# Patient Record
Sex: FEMALE | Race: WHITE | NOT HISPANIC OR LATINO | Employment: FULL TIME | URBAN - METROPOLITAN AREA
[De-identification: names, ages, dates, MRNs, and addresses within clinical notes are randomized per-mention and may not be internally consistent; named-entity substitution may affect disease eponyms.]

---

## 2017-01-26 DIAGNOSIS — N94.3 PREMENSTRUAL TENSION SYNDROME: Primary | ICD-10-CM

## 2017-01-26 NOTE — TELEPHONE ENCOUNTER
FLUoxetine (PROZAC) 20 MG capsule     Last Written Prescription Date: 9/2/16  Last Fill Quantity: 180, # refills: 0  Last Office Visit with [facility] primary care provider:  12/16/15        Last PHQ-9 score on record=   PHQ-9 SCORE 6/22/2015   Total Score 4

## 2017-01-30 NOTE — TELEPHONE ENCOUNTER
Routing refill request to provider for review/approval because:  Drug interaction warning with inuprofen  Patient needs to be seen because it has been more than 1 year since last office visit.  Eli Garrett RN, BSN  [facility]

## 2017-03-06 ENCOUNTER — RADIANT APPOINTMENT (OUTPATIENT)
Dept: MAMMOGRAPHY | Facility: CLINIC | Age: 47
End: 2017-03-06
Payer: COMMERCIAL

## 2017-03-06 ENCOUNTER — OFFICE VISIT (OUTPATIENT)
Dept: FAMILY MEDICINE | Facility: CLINIC | Age: 47
End: 2017-03-06
Payer: COMMERCIAL

## 2017-03-06 VITALS
WEIGHT: 186 LBS | DIASTOLIC BLOOD PRESSURE: 80 MMHG | OXYGEN SATURATION: 97 % | SYSTOLIC BLOOD PRESSURE: 118 MMHG | HEIGHT: 62 IN | TEMPERATURE: 99 F | BODY MASS INDEX: 34.23 KG/M2 | HEART RATE: 83 BPM

## 2017-03-06 DIAGNOSIS — Z12.31 VISIT FOR SCREENING MAMMOGRAM: ICD-10-CM

## 2017-03-06 DIAGNOSIS — E03.4 HYPOTHYROIDISM DUE TO ACQUIRED ATROPHY OF THYROID: ICD-10-CM

## 2017-03-06 DIAGNOSIS — Z00.00 ROUTINE GENERAL MEDICAL EXAMINATION AT A HEALTH CARE FACILITY: Primary | ICD-10-CM

## 2017-03-06 DIAGNOSIS — N94.3 PREMENSTRUAL TENSION SYNDROME: ICD-10-CM

## 2017-03-06 DIAGNOSIS — Z13.6 CARDIOVASCULAR SCREENING; LDL GOAL LESS THAN 160: ICD-10-CM

## 2017-03-06 PROCEDURE — G0202 SCR MAMMO BI INCL CAD: HCPCS | Mod: TC

## 2017-03-06 PROCEDURE — 99396 PREV VISIT EST AGE 40-64: CPT | Performed by: FAMILY MEDICINE

## 2017-03-06 NOTE — TELEPHONE ENCOUNTER
levothyroxine (SYNTHROID, LEVOTHROID) 75 MCG tablet     Last Written Prescription Date: 09-  Last Quantity: 90, # refills: 0  Last Office Visit with G, P or [facility] prescribing provider: 06-   Next 5 appointments (look out 90 days)     Mar 06, 2017  1:20 PM CST   PHYSICAL with Karen Weiler, MD   [facility] [place] [facility] ([facility])    [address] [id number] [phone]                   TSH   Date Value Ref Range Status   03/04/2016 2.51 0.40 - 4.00 mU/L Final

## 2017-03-06 NOTE — PROGRESS NOTES
SUBJECTIVE:     CC: Leticia Aquino is an 46 year old woman who presents for preventive health visit.     Healthy Habits:    Do you get at least three servings of calcium containing foods daily (dairy, green leafy vegetables, etc.)? yes    Amount of exercise or daily activities, outside of work: 4 day(s) per week 40 minutes     Problems taking medications regularly No    Medication side effects: No    Have you had an eye exam in the past two years? no    Do you see a dentist twice per year? yes    Do you have sleep apnea, excessive snoring or daytime drowsiness?no    Menstrual periods:  Patient reports she no longer has menstrual periods because she had a partial hysterectomy, and still has her ovaries and cervix.    Thyroid:  Patient reports she has not had her thyroid levels checked in one year, but does not have complaints. She is stable on her current medication regimen.      Patient reports she is doing well on her prescription of Prozac.         Today's PHQ-2 Score:   PHQ-2 ( 1999 Pf[employer]) 3/6/2017 6/22/2015   Q1: Little interest or pleasure in doing things 0 0   Q2: Feeling down, depressed or hopeless 0 0   PHQ-2 Score 0 0       Abuse: Current or Past(Physical, Sexual or Emotional)- No  Do you feel safe in your environment - Yes    Social History   Substance Use Topics     Smoking status: Former Smoker     Smokeless tobacco: Never Used      Comment: QUIT 1988     Alcohol use Yes      Comment: 2-3 per week      The patient does not drink >3 drinks per day nor >7 drinks per week.    Recent Labs   Lab Test  06/08/15   0808  11/06/13   0911   CHOL  227*  217*   HDL  46*  52   LDL  146*  140*   TRIG  175*  124   CHOLHDLRATIO  4.9  4.2       Reviewed orders with patient.  Reviewed health maintenance and updated orders accordingly - Yes    Mammo Decision Support:  Patient under age 50, mutual decision reflected in health maintenance.      Pertinent mammograms are reviewed under the imaging tab.  History of  "abnormal Pap smear: NO - age 30-65 PAP every 5 years with negative HPV co-testing recommended    Reviewed and updated as needed this visit by clinical staff  Tobacco  Allergies  Meds  Med Hx  Surg Hx  Fam Hx  Soc Hx        Reviewed and updated as needed this visit by Provider        ROS:  C: NEGATIVE for fever, chills, change in weight  I: NEGATIVE for worrisome rashes, moles or lesions  E: NEGATIVE for vision changes or irritation  ENT: NEGATIVE for ear, mouth and throat problems  R: NEGATIVE for significant cough or SOB  B: NEGATIVE for masses, tenderness or discharge  CV: NEGATIVE for chest pain, palpitations or peripheral edema  GI: NEGATIVE for nausea, abdominal pain, heartburn, or change in bowel habits  : NEGATIVE for unusual urinary or vaginal symptoms. Periods are regular.  M: NEGATIVE for significant arthralgias or myalgia  N: NEGATIVE for weakness, dizziness or paresthesias  P: NEGATIVE for changes in mood or affect    Problem list, Medication list, Allergies, and Medical/Social/Surgical histories reviewed in [facility] and updated as appropriate.    This document serves as a record of the services and decisions personally performed and made by Karen Weiler, MD. It was created on her behalf by Rosetta Whittington, a trained medical scribe. The creation of this document is based the provider's statements to the medical scribe.  Rosetta Whittington March 6, 2017 1:15 PM    OBJECTIVE:     /80  Pulse 83  Temp 99  F (37.2  C) (Oral)  Ht 1.575 m (5' 2\")  Wt 84.4 kg (186 lb)  LMP 09/13/2012  SpO2 97%  BMI 34.02 kg/m2  EXAM:  GENERAL: healthy, alert and no distress  EYES: Eyes grossly normal to inspection, PERRL and conjunctivae and sclerae normal  HENT: ear canals and TM's normal, nose and mouth without ulcers or lesions  NECK: no adenopathy, no asymmetry, masses, or scars and thyroid normal to palpation  RESP: lungs clear to auscultation - no rales, rhonchi or wheezes  BREAST: normal without masses, tenderness or nipple " "discharge and no palpable axillary masses or adenopathy  CV: regular rate and rhythm, normal S1 S2, no S3 or S4, no murmur, click or rub, no peripheral edema and peripheral pulses strong  ABDOMEN: soft, nontender, no hepatosplenomegaly, no masses and bowel sounds normal  MS: no gross musculoskeletal defects noted, no edema  SKIN: no suspicious lesions or rashes  NEURO: Normal strength and tone, mentation intact and speech normal  PSYCH: mentation appears normal, affect normal/bright    ASSESSMENT/PLAN:     (Z00.00) Routine general medical examination at a health care facility  (primary encounter diagnosis)  Comment: Doing well  Plan: CBC with platelets and differential, Basic         metabolic panel        Follow up based on labs      (Z13.6) CARDIOVASCULAR SCREENING; LDL GOAL LESS THAN 160  Plan: Lipid panel reflex to direct LDL        Follow up based on labs      (E03.4) Hypothyroidism due to acquired atrophy of thyroid  Comment: History of thyroid dysfunction  Plan: TSH with free T4 reflex        Follow up based on labs      (N94.3) Premenstrual tension syndrome  Comment: Managed on current regimen  Plan: FLUoxetine (PROZAC) 20 MG capsule        Follow up as needed        COUNSELING:   Reviewed preventive health counseling, as reflected in patient instructions       (Teresa)menopause management         reports that she has quit smoking. She has never used smokeless tobacco.    Estimated body mass index is 34.02 kg/(m^2) as calculated from the following:    Height as of this encounter: 1.575 m (5' 2\").    Weight as of this encounter: 84.4 kg (186 lb).   Weight management plan: refer to patient instructions    Counseling Resources:  ATP IV Guidelines  Pooled Cohorts Equation Calculator  Breast Cancer Risk Calculator  FRAX Risk Assessment  ICSI Preventive Guidelines  Dietary Guidelines for Americans, 2010  USDA's MyPlate  ASA Prophylaxis  Lung CA Screening    The information in this document, created by the medical " scribe for me, accurately reflects the services I personally performed and the decisions made by me. I have reviewed and approved this document for accuracy prior to leaving the patient care area.  3/6/2017 1:14 PM         Karen Weiler, MD  [facility]

## 2017-03-06 NOTE — MR AVS SNAPSHOT
After Visit Summary   3/6/2017    Leticia Aquino    MRN: [id number]           Patient Information     Date Of Birth          1970        Visit Information        Provider Department      3/6/2017 1:20 PM Weiler, Karen, MD [facility]        Today's Diagnoses     CARDIOVASCULAR SCREENING; LDL GOAL LESS THAN 160    -  1    Visit for screening mammogram        Routine general medical examination at a health care facility        Hypothyroidism due to acquired atrophy of thyroid          Care Instructions      Preventive Health Recommendations  Female Ages 40 to 49    Yearly exam:     See your health care provider every year in order to  1. Review health changes.   2. Discuss preventive care.    3. Review your medicines if your doctor prescribed any.      Get a Pap test every three years (unless you have an abnormal result and your provider advises testing more often).      If you get Pap tests with HPV test, you only need to test every 5 years, unless you have an abnormal result. You do not need a Pap test if your uterus was removed (hysterectomy) and you have not had cancer.      You should be tested each year for STDs (sexually transmitted diseases), if you're at risk.       Ask your doctor if you should have a mammogram.      Have a colonoscopy (test for colon cancer) if someone in your family has had colon cancer or polyps before age 50.       Have a cholesterol test every 5 years.       Have a diabetes test (fasting glucose) after age 45. If you are at risk for diabetes, you should have this test every 3 years.    Shots: Get a flu shot each year. Get a tetanus shot every 10 years.     Nutrition:     Eat at least 5 servings of fruits and vegetables each day.    Eat whole-grain bread, whole-wheat pasta and brown rice instead of white grains and rice.    Talk to your provider about Calcium and Vitamin D.     Lifestyle    Exercise at least 150 minutes a week (an average of 30  minutes a day, 5 days a week). This will help you control your weight and prevent disease.    Limit alcohol to one drink per day.    No smoking.     Wear sunscreen to prevent skin cancer.    See your dentist every six months for an exam and cleaning.        Follow-ups after your visit        Future tests that were ordered for you today     Open Future Orders        Priority Expected Expires Ordered    TSH with free T4 reflex Routine  9/6/2017 3/6/2017    CBC with platelets and differential Routine  9/6/2017 3/6/2017    Basic metabolic panel Routine  9/6/2017 3/6/2017    Lipid panel reflex to direct LDL Routine  9/6/2017 3/6/2017            Who to contact     If you have questions or need follow up information about today's clinic visit or your schedule please contact [facility] [place] directly at [phone].  Normal or non-critical lab and imaging results will be communicated to you by [employer], letter or phone within 4 business days after the clinic has received the results. If you do not hear from us within 7 days, please contact the clinic through [employer] or phone. If you have a critical or abnormal lab result, we will notify you by phone as soon as possible.  Submit refill requests through [employer] or call your pharmacy and they will forward the refill request to us. Please allow 3 business days for your refill to be completed.          Additional Information About Your Visit        [employer] Information     [employer] gives you secure access to your electronic health record. If you see a primary care provider, you can also send messages to your care team and make appointments. If you have questions, please call your primary care clinic.  If you do not have a primary care provider, please call [phone] and they will assist you.        Care EveryWhere ID     This is your Care EveryWhere ID. This could be used by other organizations to access your [place] medical records  [id number]        Your Vitals Were  "    Pulse Temperature Height Last Period Pulse Oximetry BMI (Body Mass Index)    83 99  F (37.2  C) (Oral) 5' 2\" (1.575 m) 09/13/2012 97% 34.02 kg/m2       Blood Pressure from Last 3 Encounters:   03/06/17 118/80   12/21/15 136/86   12/16/15 126/84    Weight from Last 3 Encounters:   03/06/17 186 lb (84.4 kg)   12/21/15 200 lb (90.7 kg)   12/16/15 202 lb 11.2 oz (91.9 kg)               Primary Care Provider    [facility]       No address on file        Thank you!     Thank you for choosing [facility]  for your care. Our goal is always to provide you with excellent care. Hearing back from our patients is one way we can continue to improve our services. Please take a few minutes to complete the written survey that you may receive in the mail after your visit with us. Thank you!             Your Updated Medication List - Protect others around you: Learn how to safely use, store and throw away your medicines at www.disposemymeds.org.          This list is accurate as of: 3/6/17  1:42 PM.  Always use your most recent med list.                   Brand Name Dispense Instructions for use    acyclovir 200 MG capsule    ZOVIRAX    25 capsule    Take 1 capsule (200 mg) by mouth 5 times daily       FLUoxetine 20 MG capsule    PROZAC    60 capsule    Take 2 capsules (40 mg) by mouth daily       ibuprofen 600 MG tablet    ADVIL/MOTRIN    60    take 1 every 8 hrs if needed       levothyroxine 75 MCG tablet    SYNTHROID/LEVOTHROID    90 tablet    Take 1 tablet (75 mcg) by mouth daily         "

## 2017-03-06 NOTE — NURSING NOTE
"Chief Complaint   Patient presents with     Physical       Initial /80  Pulse 83  Temp 99  F (37.2  C) (Oral)  Ht 5' 2\" (1.575 m)  Wt 186 lb (84.4 kg)  LMP 09/13/2012  SpO2 97%  BMI 34.02 kg/m2 Estimated body mass index is 34.02 kg/(m^2) as calculated from the following:    Height as of this encounter: 5' 2\" (1.575 m).    Weight as of this encounter: 186 lb (84.4 kg).  Medication Reconciliation: complete   Smiley Whiteside Medical Assistant      "

## 2017-03-07 NOTE — TELEPHONE ENCOUNTER
Patient is coming in for lab work on 3/8/17 - will await results prior to refilling.  Eli Garrett RN, BSN  [facility]

## 2017-03-08 DIAGNOSIS — Z13.6 CARDIOVASCULAR SCREENING; LDL GOAL LESS THAN 160: ICD-10-CM

## 2017-03-08 DIAGNOSIS — E03.4 HYPOTHYROIDISM DUE TO ACQUIRED ATROPHY OF THYROID: ICD-10-CM

## 2017-03-08 DIAGNOSIS — Z00.00 ROUTINE GENERAL MEDICAL EXAMINATION AT A HEALTH CARE FACILITY: ICD-10-CM

## 2017-03-08 LAB
ANION GAP SERPL CALCULATED.3IONS-SCNC: 6 MMOL/L (ref 3–14)
BASOPHILS # BLD AUTO: 0 10E9/L (ref 0–0.2)
BASOPHILS NFR BLD AUTO: 0.9 %
BUN SERPL-MCNC: 18 MG/DL (ref 7–30)
CALCIUM SERPL-MCNC: 9.1 MG/DL (ref 8.5–10.1)
CHLORIDE SERPL-SCNC: 107 MMOL/L (ref 94–109)
CHOLEST SERPL-MCNC: 207 MG/DL
CO2 SERPL-SCNC: 27 MMOL/L (ref 20–32)
CREAT SERPL-MCNC: 0.83 MG/DL (ref 0.52–1.04)
DIFFERENTIAL METHOD BLD: NORMAL
EOSINOPHIL # BLD AUTO: 0.2 10E9/L (ref 0–0.7)
EOSINOPHIL NFR BLD AUTO: 5.4 %
ERYTHROCYTE [DISTWIDTH] IN BLOOD BY AUTOMATED COUNT: 13.1 % (ref 10–15)
GFR SERPL CREATININE-BSD FRML MDRD: 73 ML/MIN/1.7M2
GLUCOSE SERPL-MCNC: 96 MG/DL (ref 70–99)
HCT VFR BLD AUTO: 38.6 % (ref 35–47)
HDLC SERPL-MCNC: 52 MG/DL
HGB BLD-MCNC: 13.1 G/DL (ref 11.7–15.7)
LDLC SERPL CALC-MCNC: 129 MG/DL
LYMPHOCYTES # BLD AUTO: 1 10E9/L (ref 0.8–5.3)
LYMPHOCYTES NFR BLD AUTO: 23.4 %
MCH RBC QN AUTO: 31 PG (ref 26.5–33)
MCHC RBC AUTO-ENTMCNC: 33.9 G/DL (ref 31.5–36.5)
MCV RBC AUTO: 91 FL (ref 78–100)
MONOCYTES # BLD AUTO: 0.6 10E9/L (ref 0–1.3)
MONOCYTES NFR BLD AUTO: 14.3 %
NEUTROPHILS # BLD AUTO: 2.4 10E9/L (ref 1.6–8.3)
NEUTROPHILS NFR BLD AUTO: 56 %
NONHDLC SERPL-MCNC: 155 MG/DL
PLATELET # BLD AUTO: 226 10E9/L (ref 150–450)
POTASSIUM SERPL-SCNC: 4 MMOL/L (ref 3.4–5.3)
RBC # BLD AUTO: 4.23 10E12/L (ref 3.8–5.2)
SODIUM SERPL-SCNC: 140 MMOL/L (ref 133–144)
T4 FREE SERPL-MCNC: 0.97 NG/DL (ref 0.76–1.46)
TRIGL SERPL-MCNC: 131 MG/DL
TSH SERPL DL<=0.005 MIU/L-ACNC: 5.16 MU/L (ref 0.4–4)
WBC # BLD AUTO: 4.3 10E9/L (ref 4–11)

## 2017-03-08 PROCEDURE — 85025 COMPLETE CBC W/AUTO DIFF WBC: CPT | Performed by: FAMILY MEDICINE

## 2017-03-08 PROCEDURE — 36415 COLL VENOUS BLD VENIPUNCTURE: CPT | Performed by: FAMILY MEDICINE

## 2017-03-08 PROCEDURE — 80061 LIPID PANEL: CPT | Performed by: FAMILY MEDICINE

## 2017-03-08 PROCEDURE — 84443 ASSAY THYROID STIM HORMONE: CPT | Performed by: FAMILY MEDICINE

## 2017-03-08 PROCEDURE — 80048 BASIC METABOLIC PNL TOTAL CA: CPT | Performed by: FAMILY MEDICINE

## 2017-03-08 PROCEDURE — 84439 ASSAY OF FREE THYROXINE: CPT | Performed by: FAMILY MEDICINE

## 2017-03-09 RX ORDER — LEVOTHYROXINE SODIUM 75 UG/1
75 TABLET ORAL DAILY
Qty: 90 TABLET | Refills: 0 | Status: CANCELLED | OUTPATIENT
Start: 2017-03-09

## 2017-03-09 NOTE — TELEPHONE ENCOUNTER
Routing refill request to provider for review/approval because:  Patient labs completed.  Please advise to if appropriate dose or follow up requirements.  Jocelyn Driscoll RN  [employer]

## 2017-03-14 RX ORDER — LEVOTHYROXINE SODIUM 100 UG/1
100 TABLET ORAL DAILY
Qty: 30 TABLET | Refills: 1 | Status: SHIPPED | OUTPATIENT
Start: 2017-03-14 | End: 2017-04-27

## 2017-03-14 NOTE — TELEPHONE ENCOUNTER
Please see encounters below. Patient calling today as she is now out of medication. Labs have still not been reviewed, nor have dosing recommendations been made. Are you able to review labs and advise in MD DEZ's absence?  Eli Garrett RN, BSN  [facility]

## 2017-03-14 NOTE — TELEPHONE ENCOUNTER
Spoke with patient and advised of MD ZACHARIAH's message below.     Patient verbalized understanding and agrees with plan.    Will call back if further questions or concerns.    Eli Garrett, RN, BSN

## 2017-03-14 NOTE — TELEPHONE ENCOUNTER
TSH is little high, indicating we may not be giving high enough dose of levothyroxine.  I've increased her dose from 75 mcg to 100 mcg, sending this to:    [facility] [id number] [address] AT [facility] OF [facility] [address] [address]  Phone: [phone] Fax: [phone]     She should have her TSH rechecked in about six weeks with the lab.  If her TSH is below 2 when it's rechecked, 100 mcg will be our new levothyroxine dose and I can send in a full year's worth of refills to her pharmacy.  The future TSH in 6 weeks will come to me instead of Dr. Weiler since I'm ordering it.  I'll send her that result and instructions through her [employer] account once the labs come back to me.     Please call patient.     Adeel Draper MD

## 2017-03-15 DIAGNOSIS — E03.4 HYPOTHYROIDISM DUE TO ACQUIRED ATROPHY OF THYROID: ICD-10-CM

## 2017-03-15 RX ORDER — LEVOTHYROXINE SODIUM 100 UG/1
100 TABLET ORAL DAILY
Qty: 90 TABLET | Refills: 1 | Status: CANCELLED | OUTPATIENT
Start: 2017-03-15

## 2017-03-15 NOTE — TELEPHONE ENCOUNTER
Since this is a new dose for Ms. Aquino, we need to find out if this is the correct dose for her but we can only determine that after we check another TSH in 6 weeks.  That's how long it takes the new dosage to affect the thyroid labs.  If her TSH check in 6 weeks shows that it's now at a normal level we then can refill it for 90 days with a year of refills.  Please call patient.     Adeel Draper MD

## 2017-03-15 NOTE — TELEPHONE ENCOUNTER
levothyroxine (SYNTHROID/LEVOTHROID) 100 MCG tablet  Requesting 90 day supply     Last Written Prescription Date: 3/14/2017  Last Quantity: 30 tablet, # refills: 1  Last Office Visit with [facility], [facility] or [facility] prescribing provider: 3/6/2017        TSH   Date Value Ref Range Status   03/08/2017 5.16 (H) 0.40 - 4.00 mU/L Final

## 2017-03-15 NOTE — PROGRESS NOTES
Dear Leticia,    Dr. Weiler has reviewed your recent labs:  -Kidney function is normal (Cr, GFR), Sodium is normal, Potassium is normal, Calcium is normal, Glucose is normal (diabetes screening test).   -LDL(bad) cholesterol level is elevated.  A diet high in fat and simple carbohydrates, genetics and being overweight can contribute to this. ADVISE: Exercise, a low fat/low carbohydrate diet and weight control are helpful to improve this.  Rechecking your fasting cholesterol panel in 12 months is recommended.  -Your TSH was slightly elevated, but your active thyroid hormone level (free T4) was normal. Dr. Weiler would like you to stay on the same dose of thyroid medication and recheck your labs in 6 months. Please let us know sooner if you develop any symptoms of hypothyroidism (excessive fatigue, constipation, dry skin, weight gain, etc.).  -Normal red blood cell (hgb) levels, normal white blood cell count and normal platelet levels.    If you have further questions about the interpretation of your labs, [email or web address] is a good website to check out for further information.    Please contact the clinic if you have additional questions.  Thank you.    Sincerely,    Mariajose Gomez PA-C  Physician extender for Dr. Karen Weiler

## 2017-03-15 NOTE — TELEPHONE ENCOUNTER
Routing refill request to provider for review/approval because:  Labs out of range:  Patient is requesting a 90 day supply    Nelda Mcnulty RN  [facility]  [phone]

## 2017-04-12 ENCOUNTER — TELEPHONE (OUTPATIENT)
Dept: FAMILY MEDICINE | Facility: CLINIC | Age: 47
End: 2017-04-12

## 2017-04-27 DIAGNOSIS — E03.4 HYPOTHYROIDISM DUE TO ACQUIRED ATROPHY OF THYROID: ICD-10-CM

## 2017-04-27 LAB — TSH SERPL DL<=0.005 MIU/L-ACNC: 3.2 MU/L (ref 0.4–4)

## 2017-04-27 PROCEDURE — 84443 ASSAY THYROID STIM HORMONE: CPT | Performed by: FAMILY MEDICINE

## 2017-04-27 PROCEDURE — 36415 COLL VENOUS BLD VENIPUNCTURE: CPT | Performed by: FAMILY MEDICINE

## 2017-04-27 RX ORDER — LEVOTHYROXINE SODIUM 100 UG/1
100 TABLET ORAL DAILY
Qty: 90 TABLET | Refills: 3 | Status: SHIPPED | OUTPATIENT
Start: 2017-04-27 | End: 2018-05-14

## 2017-04-27 NOTE — PROGRESS NOTES
Ms. Aquino,    -TSH (thyroid stimulating hormone) level is normal which indicates appropriate thyroid replacement dosing.  ADVISE: continuing same replacement dose and recheck in 12 months (TSH w/ T4 reflex, DX: hypothyroidism.)  -1 year Rx for levothyroxine sent to   [id number] - [place], [place] - [address] AT [facility] OF [facility] [address]  [address]  [facility] [address]  Phone: [phone] Fax: [phone]      If you have further questions about the interpretation of your labs, [email or web address] is a good website to check out for further information.    Please contact the clinic if you have additional questions.  Thank you.    Sincerely,    Adeel Draper MD

## 2017-07-15 ENCOUNTER — NURSE TRIAGE (OUTPATIENT)
Dept: NURSING | Facility: CLINIC | Age: 47
End: 2017-07-15

## 2017-07-15 ENCOUNTER — OFFICE VISIT (OUTPATIENT)
Dept: URGENT CARE | Facility: URGENT CARE | Age: 47
End: 2017-07-15
Payer: COMMERCIAL

## 2017-07-15 VITALS
HEART RATE: 93 BPM | SYSTOLIC BLOOD PRESSURE: 140 MMHG | WEIGHT: 180 LBS | BODY MASS INDEX: 32.92 KG/M2 | DIASTOLIC BLOOD PRESSURE: 76 MMHG | OXYGEN SATURATION: 100 % | TEMPERATURE: 98.4 F

## 2017-07-15 DIAGNOSIS — K64.4 EXTERNAL HEMORRHOIDS: Primary | ICD-10-CM

## 2017-07-15 PROCEDURE — 46320 REMOVAL OF HEMORRHOID CLOT: CPT | Performed by: FAMILY MEDICINE

## 2017-07-15 NOTE — MR AVS SNAPSHOT
After Visit Summary   7/15/2017    Leticia Aquino    MRN: [id number]           Patient Information     Date Of Birth          1970        Visit Information        Provider Department      7/15/2017 10:40 AM Emile Aguillon DO [facility]        Today's Diagnoses     External hemorrhoids    -  1      Care Instructions      Discharge Instructions for Hemorrhoid Surgery  You had surgery to remove hemorrhoids. These are large, swollen veins inside and outside the anus. Hemorrhoids are caused by too much pressure on the anus. This is often due to straining during bowel movements or pressure during pregnancy. After surgery, it may take a few weeks or longer to recover. This sheet tells you how to care for yourself once you re home.   Home care  You may have some bleeding, discharge, or itching for a short time after surgery. This is common. Once at home, be sure to:    Take prescribed pain medicines on time as directed. Don t skip doses or wait until pain gets bad, as it may be harder to control.    Take sitz baths. Fill a tub with 3 inches of warm water. Sit in the basin or tub for 10 to 20 minutes a few times a day and after each bowel movement.    Avoid straining to pass stool. This can increase pressure on the anus. It can also lead to swelling.    Avoid constipation:    Use a laxative or stool softener as advised.    Eat more high-fiber foods. These include whole grains, fruit, and veggies.    Drink plenty of fluids.    Avoid heavy lifting and strenuous activity for 1 to 2 weeks.    Use suppositories and pads, if needed. These can help relieve symptoms.    Avoid driving until you re able to sit and move without pain. Ask someone to drive you to appointments, if needed.    Practice good bowel habits. Don t ignore the urge to go. But avoid spending too much time on the toilet.  Follow-up  You ll have a follow-up visit with the healthcare provider. During this  visit, the healthcare provider will check how well you re healing. This visit will likely happen within 1 to 2 weeks.  When to call your healthcare provider  Call your healthcare provider right away if you have any of the following:    Fever of 100.4 F (38.0 C) or higher, or as directed by your healthcare provider    A large amount of drainage or bleeding from the rectum    Trouble urinating    No bowel movement for more than 48 hours   Date Last Reviewed: 7/1/2016 2000-2017 [employer]. [address] [place] [address]. All rights reserved. This information is not intended as a substitute for professional medical care. Always follow your healthcare professional's instructions.                Follow-ups after your visit        Additional Services     COLORECTAL SURGERY REFERRAL       Your provider has referred you to: FM: [facility] [phone]   [email or web address]  [facility]: [facility] [phone]  [email or web address]  [facility]: Colon and Rectal Surgery Clinic [facility] [phone]   [email or web address]  N: Colon and Rectal Surgery [facility] [phone]   [email or web address]  [place] [phone]   [email or web address]  [place] [phone]   [email or web address]  N: [facility] [place] [phone]   [email or web address]://[email or web address]."[email or web address]/  [place] [phone]   [email or web address]://[email or web address]."[email or web address][email or web address]    Referral Reason(s): Hemorrhoids  Special Concerns: None  This referral is: Urgent (24 - 72 hours)  It is OK to leave a message on patient's voicemail.    Please be aware that coverage of these services is subject to the terms and limitations of your health insurance plan.  Call member services at your health plan with any benefit or coverage  questions.      Please bring the following with you to your appointment:    (1) Any X-Rays, CTs or MRIs which have been performed.  Contact the facility where they were done to arrange for  prior to your scheduled appointment.    (2) List of current medications  (3) This referral request   (4) Any documents/labs given to you for this referral                  Who to contact     If you have questions or need follow up information about today's clinic visit or your schedule please contact [facility] directly at [phone].  Normal or non-critical lab and imaging results will be communicated to you by [employer], letter or phone within 4 business days after the clinic has received the results. If you do not hear from us within 7 days, please contact the clinic through [employer] or phone. If you have a critical or abnormal lab result, we will notify you by phone as soon as possible.  Submit refill requests through [employer] or call your pharmacy and they will forward the refill request to us. Please allow 3 business days for your refill to be completed.          Additional Information About Your Visit        [employer] Information     [employer] gives you secure access to your electronic health record. If you see a primary care provider, you can also send messages to your care team and make appointments. If you have questions, please call your primary care clinic.  If you do not have a primary care provider, please call [phone] and they will assist you.        Care EveryWhere ID     This is your Care EveryWhere ID. This could be used by other organizations to access your [place] medical records  [id number]        Your Vitals Were     Pulse Temperature Last Period Pulse Oximetry BMI (Body Mass Index)       93 98.4  F (36.9  C) (Oral) 09/13/2012 100% 32.92 kg/m2        Blood Pressure from Last 3 Encounters:   07/15/17 140/76   03/06/17 118/80   12/21/15 136/86    Weight from Last 3  Encounters:   07/15/17 180 lb (81.6 kg)   03/06/17 186 lb (84.4 kg)   12/21/15 200 lb (90.7 kg)              We Performed the Following     COLORECTAL SURGERY REFERRAL     EXCISION EXTERNAL THROMBOTIC HEMORRHOID        Primary Care Provider    [facility]       No address on file        Equal Access to Services     YUNI IMELDA : Hadii ayush ku hadkulwindero Soomaali, waaxda luqadaha, qaybta kaalmada adeegyada, alexia frostnormarosie galo. So [facility] [phone].    ATENCIÓN: Si habla español, tiene a thomas disposición servicios gratuitos de asistencia lingüística. Llame al [phone].    We comply with applicable federal civil rights laws and [place] laws. We do not discriminate on the basis of race, color, national origin, age, disability sex, sexual orientation or gender identity.            Thank you!     Thank you for choosing [facility]  for your care. Our goal is always to provide you with excellent care. Hearing back from our patients is one way we can continue to improve our services. Please take a few minutes to complete the written survey that you may receive in the mail after your visit with us. Thank you!             Your Updated Medication List - Protect others around you: Learn how to safely use, store and throw away your medicines at www.disposemymeds.org.          This list is accurate as of: 7/15/17 11:09 AM.  Always use your most recent med list.                   Brand Name Dispense Instructions for use Diagnosis    acyclovir 200 MG capsule    ZOVIRAX    25 capsule    Take 1 capsule (200 mg) by mouth 5 times daily    Recurrent cold sores       FLUoxetine 20 MG capsule    PROzac    180 capsule    Take 2 capsules (40 mg) by mouth daily    Premenstrual tension syndrome       ibuprofen 600 MG tablet    ADVIL/MOTRIN    60    take 1 every 8 hrs if needed    Tension headache       levothyroxine 100 MCG tablet    SYNTHROID/LEVOTHROID    90 tablet    Take 1  tablet (100 mcg) by mouth daily    Hypothyroidism due to acquired atrophy of thyroid

## 2017-07-15 NOTE — PATIENT INSTRUCTIONS
Discharge Instructions for Hemorrhoid Surgery  You had surgery to remove hemorrhoids. These are large, swollen veins inside and outside the anus. Hemorrhoids are caused by too much pressure on the anus. This is often due to straining during bowel movements or pressure during pregnancy. After surgery, it may take a few weeks or longer to recover. This sheet tells you how to care for yourself once you re home.   Home care  You may have some bleeding, discharge, or itching for a short time after surgery. This is common. Once at home, be sure to:    Take prescribed pain medicines on time as directed. Don t skip doses or wait until pain gets bad, as it may be harder to control.    Take sitz baths. Fill a tub with 3 inches of warm water. Sit in the basin or tub for 10 to 20 minutes a few times a day and after each bowel movement.    Avoid straining to pass stool. This can increase pressure on the anus. It can also lead to swelling.    Avoid constipation:    Use a laxative or stool softener as advised.    Eat more high-fiber foods. These include whole grains, fruit, and veggies.    Drink plenty of fluids.    Avoid heavy lifting and strenuous activity for 1 to 2 weeks.    Use suppositories and pads, if needed. These can help relieve symptoms.    Avoid driving until you re able to sit and move without pain. Ask someone to drive you to appointments, if needed.    Practice good bowel habits. Don t ignore the urge to go. But avoid spending too much time on the toilet.  Follow-up  You ll have a follow-up visit with the healthcare provider. During this visit, the healthcare provider will check how well you re healing. This visit will likely happen within 1 to 2 weeks.  When to call your healthcare provider  Call your healthcare provider right away if you have any of the following:    Fever of 100.4 F (38.0 C) or higher, or as directed by your healthcare provider    A large amount of drainage or bleeding from the  rectum    Trouble urinating    No bowel movement for more than 48 hours   Date Last Reviewed: 7/1/2016 2000-2017 [employer]. [address] [place] [address], [place] [address]. All rights reserved. This information is not intended as a substitute for professional medical care. Always follow your healthcare professional's instructions.

## 2017-07-15 NOTE — NURSING NOTE
"Chief Complaint   Patient presents with     Rectal Problem     rectal pain for2 days,no bleeding       Initial /76 (BP Location: Right arm, Patient Position: Chair, Cuff Size: Adult Regular)  Pulse 93  Temp 98.4  F (36.9  C) (Oral)  Wt 180 lb (81.6 kg)  LMP 09/13/2012  SpO2 100%  BMI 32.92 kg/m2 Estimated body mass index is 32.92 kg/(m^2) as calculated from the following:    Height as of 3/6/17: 5' 2\" (1.575 m).    Weight as of this encounter: 180 lb (81.6 kg).  Medication Reconciliation: complete   Suha ORNELAS    "

## 2017-07-15 NOTE — PROGRESS NOTES
SUBJECTIVE:  Leticia Aquino is a 47 year old female is coming today because of hemorroid.  The patient reports the following symptoms: none.  The patient denies: none.  The patient has a known history of: no known risk factors.    Current Outpatient Prescriptions   Medication Sig Dispense Refill     levothyroxine (SYNTHROID/LEVOTHROID) 100 MCG tablet Take 1 tablet (100 mcg) by mouth daily 90 tablet 3     FLUoxetine (PROZAC) 20 MG capsule Take 2 capsules (40 mg) by mouth daily 180 capsule 3     acyclovir (ZOVIRAX) 200 MG capsule Take 1 capsule (200 mg) by mouth 5 times daily (Patient not taking: Reported on 7/15/2017) 25 capsule 5     IBUPROFEN 600 MG OR TABS take 1 every 8 hrs if needed  (Patient not taking: No sig reported) 60 prn       Allergies   Allergen Reactions     Augmentin GI Disturbance     Codeine      gi     Pseudoephedrine Hcl      restlessness       ROS:  CONSTITUTIONAL:NEGATIVE for fever, chills, change in weight  No constipation    OBJECTIVE:   /76 (BP Location: Right arm, Patient Position: Chair, Cuff Size: Adult Regular)  Pulse 93  Temp 98.4  F (36.9  C) (Oral)  Wt 180 lb (81.6 kg)  LMP 09/13/2012  SpO2 100%  BMI 32.92 kg/m2  EXAM:  GENERAL APPEARANCE: healthy and no distress  RECTAL:  external hemorrhoid(s) present at 9 oclock    Preped, 1% lido 1cc with epi and incision with #11 blade and some clot removed with hemostate    ASSESSMENT:  1. External hemorrhoids        PLAN:  Orders Placed This Encounter     EXCISION EXTERNAL THROMBOTIC HEMORRHOID     COLORECTAL SURGERY REFERRAL

## 2017-07-15 NOTE — TELEPHONE ENCOUNTER
"Patient reports \"I think I have a hemorhiod, I have had them in the past, but this one hurst.  I can only sit on my side and it hurts to have a BM.\"  Denies fever, blood or active rectal bleeding, but reports a protrusion the size of a blueberry on her anus.  Patient states that her worst symptoms are the pain (8/10 on 1/10 scale) and swelling.  Triaged symptoms and advised urgent care and gave care advice per guideline.  Advised patient to call back if symptoms persist, or worsen. Patient verbalized understanding and is appreciative of information, states \"I will go into [facility] now.\"    Reason for Disposition    SEVERE rectal pain (e.g., excruciating, unable to have a bowel movement)    Additional Information    Negative: Foreign body in rectum    Negative: Diarrhea is main symptom    Negative: Constipation is main symptom (e.g., pain or discomfort caused by passage of hard BMs)    Negative: Blood in or on bowel movement is main symptom    Negative: Pregnant    Negative: Sexual assault    Negative: Injury to rectum    Negative: Large mass protruding out of rectum    Negative: Patient sounds very sick or weak to the triager    Answer Assessment - Initial Assessment Questions  1. SYMPTOM:  \"What's the main symptom you're concerned about?\" (e.g., pain, itching, swelling, rash)      Pain and sweeling  2. ONSET: \"When did the ________  start?\"      Thursday 6/15/17  3. RECTAL PAIN: \"Do you have any pain around your rectum?\" \"How bad is the pain?\"  (Scale 1-10; or mild, moderate, severe)   - MILD (1-3): doesn't interfere with normal activities    - MODERATE (4-7): interferes with normal activities or awakens from sleep, limping    - SEVERE (8-10): excruciating pain, unable to have a bowel movement       8/10  4. RECTAL ITCHING: \"Do you have any itching in this area?\" \"How bad is the itching?\"  (Scale 1-10; or mild, moderate, severe)   - MILD - doesn't interfere with normal activities    - " "MODERATE-SEVERE: interferes with normal activities or awakens from sleep      none  5. CONSTIPATION: \"Do you have constipation?\" If so, \"How bad is it?\"      \"No, but it hurst very bad to go\"  6. CAUSE: \"What do you think is causing the anus symptoms?\"      Hemorrhoid  7. OTHER SYMPTOMS: \"Do you have any other symptoms?\"  (e.g., rectal bleeding, abdominal pain, vomiting, fever)      None  8. PREGNANCY: \"Is there any chance you are pregnant?\" \"When was your last menstrual period?\"      none    Protocols used: RECTAL SYMPTOMS-ADULT-AH    "

## 2017-07-17 ENCOUNTER — TRANSFERRED RECORDS (OUTPATIENT)
Dept: HEALTH INFORMATION MANAGEMENT | Facility: CLINIC | Age: 47
End: 2017-07-17

## 2017-11-26 DIAGNOSIS — B00.1 RECURRENT COLD SORES: ICD-10-CM

## 2017-11-27 NOTE — TELEPHONE ENCOUNTER
Requested Prescriptions   Pending Prescriptions Disp Refills     acyclovir (ZOVIRAX) 200 MG capsule [Pharmacy Med Name: ACYCLOVIR 200MG CAPSULES]  Last Written Prescription Date:  9/8/2016  Last Fill Quantity: 25 capsule,  # refills: 5   Last [facility] Visit with [facility] provider:  3/6/2017   Future Office Visit:      25 capsule 0     Sig: TAKE ONE CAPSULE BY MOUTH 5 TIMES DAILY    Antivirals for Herpes Protocol Passed    11/26/2017  2:43 PM       Passed - Patient is age 12 or older       Passed - Recent or future visit with authorizing provider's specialty    Patient had office visit in the last year or has a visit in the next 30 days with authorizing provider.  See chart review.              Passed - Normal serum creatinine on file in past 12 months    Recent Labs   Lab Test  03/08/17   0758   CR  0.83

## 2017-11-28 RX ORDER — ACYCLOVIR 200 MG/1
CAPSULE ORAL
Qty: 25 CAPSULE | Refills: 0 | Status: SHIPPED | OUTPATIENT
Start: 2017-11-28 | End: 2019-03-18

## 2017-11-28 NOTE — TELEPHONE ENCOUNTER
Medication is being filled for 1 time refill only due to:  Patient needs to be seen because needs to establish care with new provider.   Eli Garrett RN, BSN  [facility] [place]

## 2017-12-04 NOTE — TELEPHONE ENCOUNTER
Spoke with patient informing her that she needs to establish care with a new provider and she will call back to schedule.  Rubia Pelaez MA

## 2018-03-14 ENCOUNTER — TELEPHONE (OUTPATIENT)
Dept: FAMILY MEDICINE | Facility: CLINIC | Age: 48
End: 2018-03-14

## 2018-03-14 DIAGNOSIS — N94.3 PREMENSTRUAL TENSION SYNDROME: ICD-10-CM

## 2018-03-14 NOTE — TELEPHONE ENCOUNTER
"Requested Prescriptions   Pending Prescriptions Disp Refills     FLUoxetine (PROZAC) 20 MG capsule [Pharmacy Med Name: FLUOXETINE 20MG CAPSULES]  Last Written Prescription Date:  3/6/2017  Last Fill Quantity: 180 capsule,  # refills: 3   Last office visit: 3/6/2017 with prescribing provider:  Weiler   Future Office Visit:       180 capsule 0     Sig: TAKE 2 CAPSULES(40 MG) BY MOUTH DAILY    SSRIs Protocol Failed    3/14/2018 10:12 AM       Failed - PHQ-9 score less than 5 in past 6 months    The PHQ-9 criteria is meant to fail. It requires a PHQ-9 score review    PHQ-9 SCORE 2/17/2009 7/18/2012 6/22/2015   Total Score 5 2 4     RENETTA-7 SCORE 7/18/2012 6/22/2015   Total Score 2 0          Failed - Recent (6 mo) or future (30 days) visit within the authorizing provider's specialty    Patient had office visit in the last 6 months or has a visit in the next 30 days with authorizing provider or within the authorizing provider's specialty.  See \"Patient Info\" tab in inbasket, or \"Choose Columns\" in Meds & Orders section of the refill encounter.           Passed - Patient is age 18 or older       Passed - No active pregnancy on record       Passed - No positive pregnancy test in last 12 months          "

## 2018-03-19 NOTE — TELEPHONE ENCOUNTER
Patient has not been seen for more than one year. Please call patient to set up appointment with new provider or see if she is receiving primary care elsewhere. If she needs michael refill please send back to RN, will need to be routed to provider as reason for medication is not on protocol. Julia Morrow R.N.

## 2018-03-19 NOTE — TELEPHONE ENCOUNTER
Called [phone] and spoke with pt.  She is on auto-refill for this so does not actually need a refill.  She just picked this up recently so will call closer to when she needs a refill to make an appt.  Grace Chua

## 2018-05-12 ENCOUNTER — HEALTH MAINTENANCE LETTER (OUTPATIENT)
Age: 48
End: 2018-05-12

## 2018-05-14 DIAGNOSIS — E03.4 HYPOTHYROIDISM DUE TO ACQUIRED ATROPHY OF THYROID: ICD-10-CM

## 2018-05-14 RX ORDER — LEVOTHYROXINE SODIUM 100 UG/1
100 TABLET ORAL DAILY
Qty: 30 TABLET | Refills: 0 | Status: SHIPPED | OUTPATIENT
Start: 2018-05-14 | End: 2018-06-05

## 2018-05-14 NOTE — TELEPHONE ENCOUNTER
"Requested Prescriptions   Pending Prescriptions Disp Refills     levothyroxine (SYNTHROID/LEVOTHROID) 100 MCG tablet  Last Written Prescription Date:  4/27/2017  Last Fill Quantity: 90 tablet,  # refills: 3   Last office visit: 3/6/2017 with prescribing provider:  Weiler   Future Office Visit:       90 tablet 3     Sig: Take 1 tablet (100 mcg) by mouth daily    Thyroid Protocol Failed    5/14/2018 11:52 AM       Failed - Recent (12 mo) or future (30 days) visit within the authorizing provider's specialty    Patient had office visit in the last 12 months or has a visit in the next 30 days with authorizing provider or within the authorizing provider's specialty.  See \"Patient Info\" tab in inbasket, or \"Choose Columns\" in Meds & Orders section of the refill encounter.           Failed - Normal TSH on file in past 12 months    Recent Labs   Lab Test  04/27/17   0814   TSH  3.20             Passed - Patient is 12 years or older       Passed - No active pregnancy on record    If patient is pregnant or has had a positive pregnancy test, please check TSH.         Passed - No positive pregnancy test in past 12 months    If patient is pregnant or has had a positive pregnancy test, please check TSH.            "

## 2018-05-14 NOTE — TELEPHONE ENCOUNTER
Medication is being filled for 1 time refill only due to:  Patient needs to be seen because it has been more than one year since last visit. Needs to establish care   Eli Garrett RN, BSN  [facility] [place]

## 2018-05-29 ENCOUNTER — RADIANT APPOINTMENT (OUTPATIENT)
Dept: MAMMOGRAPHY | Facility: CLINIC | Age: 48
End: 2018-05-29
Payer: COMMERCIAL

## 2018-05-29 DIAGNOSIS — Z12.31 VISIT FOR SCREENING MAMMOGRAM: ICD-10-CM

## 2018-05-29 PROCEDURE — 77067 SCR MAMMO BI INCL CAD: CPT | Mod: TC

## 2018-05-30 NOTE — PROGRESS NOTES
Ms. Aquino,    -Mammogram was normal.  ADVISE: rechecking in 1 year.    If you have further questions about the interpretation of your labs, [email or web address] is a good website to check out for further information.    Please contact the clinic if you have additional questions.  Thank you.    Sincerely,    Adeel Draper MD

## 2018-06-05 ENCOUNTER — OFFICE VISIT (OUTPATIENT)
Dept: FAMILY MEDICINE | Facility: CLINIC | Age: 48
End: 2018-06-05
Payer: COMMERCIAL

## 2018-06-05 VITALS
DIASTOLIC BLOOD PRESSURE: 68 MMHG | TEMPERATURE: 98.7 F | SYSTOLIC BLOOD PRESSURE: 118 MMHG | BODY MASS INDEX: 32.2 KG/M2 | OXYGEN SATURATION: 98 % | HEART RATE: 100 BPM | HEIGHT: 62 IN | WEIGHT: 175 LBS

## 2018-06-05 DIAGNOSIS — N94.3 PREMENSTRUAL TENSION SYNDROME: ICD-10-CM

## 2018-06-05 DIAGNOSIS — Z13.6 CARDIOVASCULAR SCREENING; LDL GOAL LESS THAN 160: ICD-10-CM

## 2018-06-05 DIAGNOSIS — Z11.4 ENCOUNTER FOR SCREENING FOR HIV: ICD-10-CM

## 2018-06-05 DIAGNOSIS — E78.5 HYPERLIPIDEMIA LDL GOAL <160: ICD-10-CM

## 2018-06-05 DIAGNOSIS — E03.4 HYPOTHYROIDISM DUE TO ACQUIRED ATROPHY OF THYROID: Primary | ICD-10-CM

## 2018-06-05 DIAGNOSIS — Z13.1 SCREENING FOR DIABETES MELLITUS: ICD-10-CM

## 2018-06-05 PROCEDURE — 99214 OFFICE O/P EST MOD 30 MIN: CPT | Performed by: PHYSICIAN ASSISTANT

## 2018-06-05 RX ORDER — LEVOTHYROXINE SODIUM 100 UG/1
100 TABLET ORAL DAILY
Qty: 90 TABLET | Refills: 3 | Status: SHIPPED | OUTPATIENT
Start: 2018-06-05 | End: 2019-03-15

## 2018-06-05 ASSESSMENT — PATIENT HEALTH QUESTIONNAIRE - PHQ9: 5. POOR APPETITE OR OVEREATING: NOT AT ALL

## 2018-06-05 ASSESSMENT — ANXIETY QUESTIONNAIRES
5. BEING SO RESTLESS THAT IT IS HARD TO SIT STILL: NOT AT ALL
3. WORRYING TOO MUCH ABOUT DIFFERENT THINGS: NOT AT ALL
7. FEELING AFRAID AS IF SOMETHING AWFUL MIGHT HAPPEN: NOT AT ALL
1. FEELING NERVOUS, ANXIOUS, OR ON EDGE: NOT AT ALL
2. NOT BEING ABLE TO STOP OR CONTROL WORRYING: NOT AT ALL
GAD7 TOTAL SCORE: 0
IF YOU CHECKED OFF ANY PROBLEMS ON THIS QUESTIONNAIRE, HOW DIFFICULT HAVE THESE PROBLEMS MADE IT FOR YOU TO DO YOUR WORK, TAKE CARE OF THINGS AT HOME, OR GET ALONG WITH OTHER PEOPLE: NOT DIFFICULT AT ALL
6. BECOMING EASILY ANNOYED OR IRRITABLE: NOT AT ALL

## 2018-06-05 NOTE — MR AVS SNAPSHOT
After Visit Summary   6/5/2018    Leticia Aquino    MRN: [id number]           Patient Information     Date Of Birth          1970        Visit Information        Provider Department      6/5/2018 3:00 PM Janina Vanessa PA-C [facility] [place]        Today's Diagnoses     Hypothyroidism due to acquired atrophy of thyroid    -  1    CARDIOVASCULAR SCREENING; LDL GOAL LESS THAN 160        Encounter for screening for HIV        Screening for deficiency anemia        Screening for diabetes mellitus        Premenstrual tension syndrome          Care Instructions    Mood and thyroid stable.  Add fasting labs as due for these as well - return for lab appt.  Notify of results when available.  Continue meds without changes unless notified based on blood work.     Electronically Signed By: Janina Vanessa PA-C            Follow-ups after your visit        Follow-up notes from your care team     Return in about 1 week (around 6/12/2018) for Lab Work.      Future tests that were ordered for you today     Open Future Orders        Priority Expected Expires Ordered    **TSH with free T4 reflex FUTURE 2mo Routine 8/4/2018 10/3/2018 6/5/2018    Lipid panel reflex to direct LDL Fasting Routine 8/4/2018 10/3/2018 6/5/2018    **Comprehensive metabolic panel FUTURE 2mo Routine 8/4/2018 10/3/2018 6/5/2018    **HIV Antigen Antibody Combo FUTURE 2mo Routine 8/4/2018 10/3/2018 6/5/2018            Who to contact     If you have questions or need follow up information about today's clinic visit or your schedule please contact [facility] [place] directly at [phone].  Normal or non-critical lab and imaging results will be communicated to you by MyChart, letter or phone within 4 business days after the clinic has received the results. If you do not hear from us within 7 days, please contact the clinic through MyChart or phone. If you have a critical or abnormal lab result, we will notify  "you by phone as soon as possible.  Submit refill requests through [employer] or call your pharmacy and they will forward the refill request to us. Please allow 3 business days for your refill to be completed.          Additional Information About Your Visit        [employer] Information     [employer] gives you secure access to your electronic health record. If you see a primary care provider, you can also send messages to your care team and make appointments. If you have questions, please call your primary care clinic.  If you do not have a primary care provider, please call [phone] and they will assist you.        Care EveryWhere ID     This is your Care EveryWhere ID. This could be used by other organizations to access your [facility] medical records  [id number]        Your Vitals Were     Pulse Temperature Height Last Period Pulse Oximetry Breastfeeding?    100 98.7  F (37.1  C) (Oral) 5' 2\" (1.575 m) 09/13/2012 98% No    BMI (Body Mass Index)                   32.01 kg/m2            Blood Pressure from Last 3 Encounters:   06/05/18 118/68   07/15/17 140/76   03/06/17 118/80    Weight from Last 3 Encounters:   06/05/18 175 lb (79.4 kg)   07/15/17 180 lb (81.6 kg)   03/06/17 186 lb (84.4 kg)                 Where to get your medicines      These medications were sent to [facility] [address]  AT [facility] [address] [place] [address], [facility] [address]    Hours:  24-hours Phone:  [phone]     FLUoxetine 20 MG capsule    levothyroxine 100 MCG tablet          Primary Care Provider Office Phone # Fax #    [facility] [phone] [address]  [place] [address]        Equal Access to Services     YUNI SEGURA : Dayami Mcnair, kam villagomez, patience kaalalexia hinojosa. So [facility] [phone].    ATENCIÓN: Si habla español, tiene a thomas disposición servicios gratuitos de " asistencia lingüística. Rufina al [phone].    We comply with applicable federal civil rights laws and [place] laws. We do not discriminate on the basis of race, color, national origin, age, disability, sex, sexual orientation, or gender identity.            Thank you!     Thank you for choosing [facility]  for your care. Our goal is always to provide you with excellent care. Hearing back from our patients is one way we can continue to improve our services. Please take a few minutes to complete the written survey that you may receive in the mail after your visit with us. Thank you!             Your Updated Medication List - Protect others around you: Learn how to safely use, store and throw away your medicines at www.disposemymeds.org.          This list is accurate as of 6/5/18  3:35 PM.  Always use your most recent med list.                   Brand Name Dispense Instructions for use Diagnosis    acyclovir 200 MG capsule    ZOVIRAX    25 capsule    TAKE ONE CAPSULE BY MOUTH 5 TIMES DAILY    Recurrent cold sores       FLUoxetine 20 MG capsule    PROzac    180 capsule    Take 2 capsules (40 mg) by mouth daily    Premenstrual tension syndrome       ibuprofen 600 MG tablet    ADVIL/MOTRIN    60    take 1 every 8 hrs if needed    Tension headache       levothyroxine 100 MCG tablet    SYNTHROID/LEVOTHROID    90 tablet    Take 1 tablet (100 mcg) by mouth daily    Hypothyroidism due to acquired atrophy of thyroid

## 2018-06-05 NOTE — NURSING NOTE
"Chief Complaint   Patient presents with     Thyroid Disease     refill of medication     Recheck Medication     refill of fluoxetine    /68 (BP Location: Right arm, Cuff Size: Adult Regular)  Pulse 100  Temp 98.7  F (37.1  C) (Oral)  Ht 5' 2\" (1.575 m)  Wt 175 lb (79.4 kg)  LMP 09/13/2012  SpO2 98%  Breastfeeding? No  BMI 32.01 kg/m2 Body Mass Index is Body mass index is 32.01 kg/(m^2).  BP completed using cuff size : regular right arm  Rubia Pelaez MA        "

## 2018-06-05 NOTE — PROGRESS NOTES
SUBJECTIVE:   Leticia Aquino is a 47 year old female who presents to clinic today for the following health issues:      Hypothyroidism Follow-up; diagnosed based on symptoms years ago.  Runs in her family - daughter, son and mother.  Stable on levothyroxine 100mcg dose.      Since last visit, patient describes the following symptoms: Weight stable, no hair loss, no skin changes, no constipation, no loose stools      Amount of exercise or physical activity:     Problems taking medications regularly: No    Medication side effects: none    Diet: regular (no restrictions)        Medication Followup of Fluoxetine; was initially started on this for anxiety/depression and moodiness related to her period.     Has just stayed on it because it's helpful to her.     No issues with it.    Normal PHQ/RENETTA scores today.        Taking Medication as prescribed: yes    Side Effects:  None    Medication Helping Symptoms:  yes       Problem list and histories reviewed & adjusted, as indicated.  Additional history: as documented    Patient Active Problem List   Diagnosis     Iron deficiency anemia secondary to inadequate dietary iron intake     Premenstrual tension syndrome     Excessive or frequent menstruation     Dysmenorrhea     CARDIOVASCULAR SCREENING; LDL GOAL LESS THAN 160     Pain in joint, ankle and foot     Hypothyroidism due to acquired atrophy of thyroid     Past Surgical History:   Procedure Laterality Date     C NONSPECIFIC PROCEDURE      Right Inguinal Hernia Repair     C NONSPECIFIC PROCEDURE      D&C for Spontaneous      GYN SURGERY  11    ablation     HYSTERECTOMY SUPRACERVICAL  2012       Social History   Substance Use Topics     Smoking status: Former Smoker     Smokeless tobacco: Never Used      Comment: QUIT      Alcohol use Yes      Comment: 2-3 per week      Family History   Problem Relation Age of Onset     DIABETES Father      Insulin Dependent     Cardiovascular Father   "    Triple by-pass in [phone]     Thyroid Disease Mother      Hypothyroid     CANCER Maternal Grandfather      Thyroid Disease Daughter      Dx at age 10         Current Outpatient Prescriptions   Medication Sig Dispense Refill     FLUoxetine (PROZAC) 20 MG capsule Take 2 capsules (40 mg) by mouth daily 180 capsule 3     levothyroxine (SYNTHROID/LEVOTHROID) 100 MCG tablet Take 1 tablet (100 mcg) by mouth daily 90 tablet 3     acyclovir (ZOVIRAX) 200 MG capsule TAKE ONE CAPSULE BY MOUTH 5 TIMES DAILY 25 capsule 0     IBUPROFEN 600 MG OR TABS take 1 every 8 hrs if needed  (Patient not taking: No sig reported) 60 prn     [DISCONTINUED] FLUoxetine (PROZAC) 20 MG capsule Take 2 capsules (40 mg) by mouth daily 180 capsule 3     [DISCONTINUED] levothyroxine (SYNTHROID/LEVOTHROID) 100 MCG tablet Take 1 tablet (100 mcg) by mouth daily 30 tablet 0     Allergies   Allergen Reactions     Augmentin GI Disturbance     Codeine      gi     Pseudoephedrine Hcl      restlessness       Reviewed and updated as needed this visit by clinical staff  Tobacco  Allergies  Meds  Med Hx  Surg Hx  Fam Hx  Soc Hx      Reviewed and updated as needed this visit by Provider  Tobacco  Allergies  Meds  Med Hx  Surg Hx  Fam Hx  Soc Hx        ROS:  Constitutional, HEENT, cardiovascular, pulmonary, gi and gu, endocrine, psych systems are negative, except as otherwise noted.    OBJECTIVE:     /68 (BP Location: Right arm, Cuff Size: Adult Regular)  Pulse 100  Temp 98.7  F (37.1  C) (Oral)  Ht 5' 2\" (1.575 m)  Wt 175 lb (79.4 kg)  LMP 09/13/2012  SpO2 98%  Breastfeeding? No  BMI 32.01 kg/m2  Body mass index is 32.01 kg/(m^2).  GENERAL: healthy, alert and no distress  NECK: no adenopathy, no asymmetry, masses, or scars and thyroid normal to palpation  RESP: lungs clear to auscultation - no rales, rhonchi or wheezes  CV: regular rates and rhythm and no murmur, click or rub  PSYCH: affect bright/normal. Normal mentation and " speech.    Diagnostic Test Results:  See Flowsheets for PHQ/RENETTA scores.    ASSESSMENT/PLAN:       ICD-10-CM    1. Hypothyroidism due to acquired atrophy of thyroid E03.4 **TSH with free T4 reflex FUTURE 2mo     levothyroxine (SYNTHROID/LEVOTHROID) 100 MCG tablet   2. CARDIOVASCULAR SCREENING; LDL GOAL LESS THAN 160 Z13.6 Lipid panel reflex to direct LDL Fasting   3. Encounter for screening for HIV Z11.4 **HIV Antigen Antibody Combo FUTURE 2mo   4. Screening for diabetes mellitus Z13.1 **Comprehensive metabolic panel FUTURE 2mo   5. Premenstrual tension syndrome N94.3 FLUoxetine (PROZAC) 20 MG capsule   Pt amenable to returning for fasting labs and will re-assess TSH along with this.  Stable for years so did refill for 1 yr, but encouraged pt to follow-up prior to refilling for lab results to ensure no dose adjustment is required.  Very stable on prozac for premenstrual tension syndrome.  She will continue without changes.  Encouraged to return for physical, but pt up-to-date on mammogram and pap so declines this. Will consider for next year.  See Patient Instructions  Patient Instructions   Mood and thyroid stable.  Add fasting labs as due for these as well - return for lab appt.  Notify of results when available.  Continue meds without changes unless notified based on blood work.     Electronically Signed By: Janina Vanessa PA-C

## 2018-06-05 NOTE — PATIENT INSTRUCTIONS
Mood and thyroid stable.  Add fasting labs as due for these as well - return for lab appt.  Notify of results when available.  Continue meds without changes unless notified based on blood work.     Electronically Signed By: Janina Vanessa PA-C

## 2018-06-06 DIAGNOSIS — Z13.1 SCREENING FOR DIABETES MELLITUS: ICD-10-CM

## 2018-06-06 DIAGNOSIS — E03.4 HYPOTHYROIDISM DUE TO ACQUIRED ATROPHY OF THYROID: ICD-10-CM

## 2018-06-06 DIAGNOSIS — Z13.6 CARDIOVASCULAR SCREENING; LDL GOAL LESS THAN 160: ICD-10-CM

## 2018-06-06 DIAGNOSIS — Z11.4 ENCOUNTER FOR SCREENING FOR HIV: ICD-10-CM

## 2018-06-06 LAB
ALBUMIN SERPL-MCNC: 4 G/DL (ref 3.4–5)
ALP SERPL-CCNC: 63 U/L (ref 40–150)
ALT SERPL W P-5'-P-CCNC: 29 U/L (ref 0–50)
ANION GAP SERPL CALCULATED.3IONS-SCNC: 12 MMOL/L (ref 3–14)
AST SERPL W P-5'-P-CCNC: 20 U/L (ref 0–45)
BILIRUB SERPL-MCNC: 0.5 MG/DL (ref 0.2–1.3)
BUN SERPL-MCNC: 15 MG/DL (ref 7–30)
CALCIUM SERPL-MCNC: 9.3 MG/DL (ref 8.5–10.1)
CHLORIDE SERPL-SCNC: 106 MMOL/L (ref 94–109)
CHOLEST SERPL-MCNC: 324 MG/DL
CO2 SERPL-SCNC: 21 MMOL/L (ref 20–32)
CREAT SERPL-MCNC: 0.74 MG/DL (ref 0.52–1.04)
GFR SERPL CREATININE-BSD FRML MDRD: 83 ML/MIN/1.7M2
GLUCOSE SERPL-MCNC: 97 MG/DL (ref 70–99)
HDLC SERPL-MCNC: 53 MG/DL
HIV 1+2 AB+HIV1 P24 AG SERPL QL IA: NONREACTIVE
LDLC SERPL CALC-MCNC: 250 MG/DL
NONHDLC SERPL-MCNC: 271 MG/DL
POTASSIUM SERPL-SCNC: 4.5 MMOL/L (ref 3.4–5.3)
PROT SERPL-MCNC: 7.6 G/DL (ref 6.8–8.8)
SODIUM SERPL-SCNC: 139 MMOL/L (ref 133–144)
TRIGL SERPL-MCNC: 105 MG/DL
TSH SERPL DL<=0.005 MIU/L-ACNC: 1.82 MU/L (ref 0.4–4)

## 2018-06-06 PROCEDURE — 87389 HIV-1 AG W/HIV-1&-2 AB AG IA: CPT | Performed by: PHYSICIAN ASSISTANT

## 2018-06-06 PROCEDURE — 80061 LIPID PANEL: CPT | Performed by: PHYSICIAN ASSISTANT

## 2018-06-06 PROCEDURE — 84443 ASSAY THYROID STIM HORMONE: CPT | Performed by: PHYSICIAN ASSISTANT

## 2018-06-06 PROCEDURE — 36415 COLL VENOUS BLD VENIPUNCTURE: CPT | Performed by: PHYSICIAN ASSISTANT

## 2018-06-06 PROCEDURE — 80053 COMPREHEN METABOLIC PANEL: CPT | Performed by: PHYSICIAN ASSISTANT

## 2018-06-06 ASSESSMENT — ANXIETY QUESTIONNAIRES: GAD7 TOTAL SCORE: 0

## 2018-06-06 ASSESSMENT — PATIENT HEALTH QUESTIONNAIRE - PHQ9: SUM OF ALL RESPONSES TO PHQ QUESTIONS 1-9: 1

## 2018-06-06 NOTE — LETTER
June 8, 2018      Leticia Aquino  [address]  [place] [address]        Dear ,    We are writing to inform you of your test results.    -Liver and gallbladder tests are normal. (ALT,AST, Alk phos, bilirubin), kidney function is normal (Cr, GFR), Sodium is normal, Potassium is normal, Calcium is normal, Glucose is normal (diabetes screening test).   -LDL(bad) cholesterol level is elevated,  A diet high in fat and simple carbohydrates, genetics and being overweight can contribute to this. ADVISE: Exercise, a low fat, low carbohydrate diet and weight control are helpful to improve this.  Rechecking your fasting cholesterol panel in 3 months is recommended (Lipid w/ LDL reflex, DX:hyperlipidemia). If this remains elevated despite life-style changes and following a low cholesterol diet we may need to consider addition of a cholesterol lowering medication. Future orders are placed for you, please schedule a lab appointment for re-check in 3 months.   -TSH (thyroid stimulating hormone) level is normal which indicates appropriate thyroid replacement dosing.  ADVISE: continuing same replacement dose and recheck in 12 months (TSH w/ T4 reflex, DX: hypothyroidism.)   -HIV screening test was normal/negative.     Resulted Orders   **TSH with free T4 reflex FUTURE 2mo   Result Value Ref Range    TSH 1.82 0.40 - 4.00 mU/L   Lipid panel reflex to direct LDL Fasting   Result Value Ref Range    Cholesterol 324 (H) <200 mg/dL      Comment:      Desirable:       <200 mg/dl    Triglycerides 105 <150 mg/dL      Comment:      Non Fasting    HDL Cholesterol 53 >49 mg/dL    LDL Cholesterol Calculated 250 (H) <100 mg/dL      Comment:      Above desirable:  100-129 mg/dl  Borderline High:  130-159 mg/dL  High:             160-189 mg/dL  Very high:       >189 mg/dl      Non HDL Cholesterol 271 (H) <130 mg/dL      Comment:      Above Desirable:  130-159 mg/dl  Borderline high:  160-189 mg/dl  High:              190-219 mg/dl  Very high:       >219 mg/dl     **Comprehensive metabolic panel FUTURE 2mo   Result Value Ref Range    Sodium 139 133 - 144 mmol/L    Potassium 4.5 3.4 - 5.3 mmol/L    Chloride 106 94 - 109 mmol/L    Carbon Dioxide 21 20 - 32 mmol/L    Anion Gap 12 3 - 14 mmol/L    Glucose 97 70 - 99 mg/dL      Comment:      Non Fasting    Urea Nitrogen 15 7 - 30 mg/dL    Creatinine 0.74 0.52 - 1.04 mg/dL    GFR Estimate 83 >60 mL/min/1.7m2      Comment:      Non  GFR Calc    GFR Estimate If Black >90 >60 mL/min/1.7m2      Comment:       GFR Calc    Calcium 9.3 8.5 - 10.1 mg/dL    Bilirubin Total 0.5 0.2 - 1.3 mg/dL    Albumin 4.0 3.4 - 5.0 g/dL    Protein Total 7.6 6.8 - 8.8 g/dL    Alkaline Phosphatase 63 40 - 150 U/L    ALT 29 0 - 50 U/L    AST 20 0 - 45 U/L   **HIV Antigen Antibody Combo FUTURE 2mo   Result Value Ref Range    HIV Antigen Antibody Combo Nonreactive NR^Nonreactive          Comment:      HIV-1 p24 Ag & HIV-1/HIV-2 Ab Not Detected       If you have any questions or concerns, please call the clinic at the number listed above.       Sincerely,        Janina Vanessa PA-C

## 2018-06-08 NOTE — PROGRESS NOTES
Please call or write patient with the following results:    -Liver and gallbladder tests are normal. (ALT,AST, Alk phos, bilirubin), kidney function is normal (Cr, GFR), Sodium is normal, Potassium is normal, Calcium is normal, Glucose is normal (diabetes screening test).   -LDL(bad) cholesterol level is elevated,  A diet high in fat and simple carbohydrates, genetics and being overweight can contribute to this. ADVISE: Exercise, a low fat, low carbohydrate diet and weight control are helpful to improve this.  Rechecking your fasting cholesterol panel in 3 months is recommended (Lipid w/ LDL reflex, DX:hyperlipidemia). If this remains elevated despite life-style changes and following a low cholesterol diet we may need to consider addition of a cholesterol lowering medication. Future orders are placed for you, please schedule a lab appointment for re-check in 3 months.  -TSH (thyroid stimulating hormone) level is normal which indicates appropriate thyroid replacement dosing.  ADVISE: continuing same replacement dose and recheck in 12 months (TSH w/ T4 reflex, DX: hypothyroidism.)  -HIV screening test was normal/negative.    Electronically Signed By: Janina Vanessa PA-C

## 2018-06-08 NOTE — PROGRESS NOTES
Yes, that's ok if she wants to repeat it sooner. Please notify.  Electronically Signed By: Janina Vanessa PA-C

## 2018-06-11 DIAGNOSIS — E78.5 HYPERLIPIDEMIA LDL GOAL <160: ICD-10-CM

## 2018-06-11 PROCEDURE — 80061 LIPID PANEL: CPT | Performed by: PHYSICIAN ASSISTANT

## 2018-06-11 PROCEDURE — 36415 COLL VENOUS BLD VENIPUNCTURE: CPT | Performed by: PHYSICIAN ASSISTANT

## 2018-06-11 NOTE — LETTER
June 13, 2018      Leticia Aquino  [address]  [place] [address]        Dear ,    We are writing to inform you of your test results.    Lipid panel improved after re-checking so likely could be due to her dietary changes. Would continue to follow a low-cholesterol more mediterranean style diet and would expect cholesterol to continue to improve to levels similar of last year. We can repeat in 6 months again should she be concerned.    Resulted Orders   Lipid panel reflex to direct LDL Fasting   Result Value Ref Range    Cholesterol 273 (H) <200 mg/dL      Comment:      Desirable:       <200 mg/dl    Triglycerides 103 <150 mg/dL      Comment:      Fasting specimen    HDL Cholesterol 62 >49 mg/dL    LDL Cholesterol Calculated 190 (H) <100 mg/dL      Comment:      Above desirable:  100-129 mg/dl  Borderline High:  130-159 mg/dL  High:             160-189 mg/dL  Very high:       >189 mg/dl      Non HDL Cholesterol 211 (H) <130 mg/dL      Comment:      Above Desirable:  130-159 mg/dl  Borderline high:  160-189 mg/dl  High:             190-219 mg/dl  Very high:       >219 mg/dl               If you have any questions or concerns, please call the clinic at the number listed above.         Sincerely,        Janina Vanessa PA-C

## 2018-06-12 LAB
CHOLEST SERPL-MCNC: 273 MG/DL
HDLC SERPL-MCNC: 62 MG/DL
LDLC SERPL CALC-MCNC: 190 MG/DL
NONHDLC SERPL-MCNC: 211 MG/DL
TRIGL SERPL-MCNC: 103 MG/DL

## 2018-06-13 ENCOUNTER — E-VISIT (OUTPATIENT)
Dept: FAMILY MEDICINE | Facility: CLINIC | Age: 48
End: 2018-06-13
Payer: COMMERCIAL

## 2018-06-13 DIAGNOSIS — N30.01 ACUTE CYSTITIS WITH HEMATURIA: ICD-10-CM

## 2018-06-13 DIAGNOSIS — R30.0 DYSURIA: Primary | ICD-10-CM

## 2018-06-13 PROCEDURE — 99444 ZZC PHYSICIAN ONLINE EVALUATION & MANAGEMENT SERVICE: CPT | Performed by: PHYSICIAN ASSISTANT

## 2018-06-13 NOTE — PROGRESS NOTES
Please call or write patient with the following results:    Lipid panel improved after re-checking so likely could be due to her dietary changes. Would continue to follow a low-cholesterol more mediterranean style diet and would expect cholesterol to continue to improve to levels similar of last year. We can repeat in 6 months again should she be concerned.    Electronically Signed By: Janina Vanessa PA-C

## 2018-06-14 DIAGNOSIS — R30.0 DYSURIA: ICD-10-CM

## 2018-06-14 LAB
ALBUMIN UR-MCNC: NEGATIVE MG/DL
APPEARANCE UR: ABNORMAL
BACTERIA #/AREA URNS HPF: ABNORMAL /HPF
BILIRUB UR QL STRIP: NEGATIVE
COLOR UR AUTO: YELLOW
GLUCOSE UR STRIP-MCNC: NEGATIVE MG/DL
HGB UR QL STRIP: ABNORMAL
KETONES UR STRIP-MCNC: 80 MG/DL
LEUKOCYTE ESTERASE UR QL STRIP: ABNORMAL
NITRATE UR QL: POSITIVE
NON-SQ EPI CELLS #/AREA URNS LPF: ABNORMAL /LPF
PH UR STRIP: 5.5 PH (ref 5–7)
RBC #/AREA URNS AUTO: ABNORMAL /HPF
SOURCE: ABNORMAL
SP GR UR STRIP: 1.02 (ref 1–1.03)
UROBILINOGEN UR STRIP-ACNC: 0.2 EU/DL (ref 0.2–1)
WBC #/AREA URNS AUTO: ABNORMAL /HPF

## 2018-06-14 PROCEDURE — 87088 URINE BACTERIA CULTURE: CPT | Performed by: PHYSICIAN ASSISTANT

## 2018-06-14 PROCEDURE — 87086 URINE CULTURE/COLONY COUNT: CPT | Performed by: PHYSICIAN ASSISTANT

## 2018-06-14 PROCEDURE — 87186 SC STD MICRODIL/AGAR DIL: CPT | Performed by: PHYSICIAN ASSISTANT

## 2018-06-14 PROCEDURE — 81001 URINALYSIS AUTO W/SCOPE: CPT | Performed by: PHYSICIAN ASSISTANT

## 2018-06-14 NOTE — LETTER
June 18, 2018      Leticia Aquino  [address] [place] [address]        Dear ,    We are writing to inform you of your test results.    -Urine culture is abnormal and grew out bacteria that are sensitive to the antibiotic you have been given.  Complete the medication as prescribed and if you experience new, worsening or persistent symptoms, you should call or return for a recheck    Resulted Orders   *UA reflex to Microscopic and Culture ([place] and [facility] (except [place] and [place])   Result Value Ref Range    Color Urine Yellow     Appearance Urine Slightly Cloudy     Glucose Urine Negative NEG^Negative mg/dL    Bilirubin Urine Negative NEG^Negative    Ketones Urine 80 (A) NEG^Negative mg/dL    Specific Gravity Urine 1.020 1.003 - 1.035    Blood Urine Trace (A) NEG^Negative    pH Urine 5.5 5.0 - 7.0 pH    Protein Albumin Urine Negative NEG^Negative mg/dL    Urobilinogen Urine 0.2 0.2 - 1.0 EU/dL    Nitrite Urine Positive (A) NEG^Negative    Leukocyte Esterase Urine Trace (A) NEG^Negative    Source Midstream Urine    Urine Microscopic   Result Value Ref Range    WBC Urine 10-25 (A) OTO5^0 - 5 /HPF    RBC Urine 2-5 (A) OTO2^O - 2 /HPF    Squamous Epithelial /LPF Urine Moderate (A) FEW^Few /LPF    Bacteria Urine Moderate (A) NEG^Negative /HPF   Urine Culture Aerobic Bacterial   Result Value Ref Range    Specimen Description Midstream Urine     Culture Micro >100,000 colonies/mL  Escherichia coli   (A)        If you have any questions or concerns, please call the clinic at the number listed above.       Sincerely,      Janina Vanessa PA-C

## 2018-06-15 RX ORDER — SULFAMETHOXAZOLE/TRIMETHOPRIM 800-160 MG
1 TABLET ORAL 2 TIMES DAILY
Qty: 6 TABLET | Refills: 0 | Status: SHIPPED | OUTPATIENT
Start: 2018-06-15 | End: 2018-06-18

## 2018-06-16 LAB
BACTERIA SPEC CULT: ABNORMAL
SPECIMEN SOURCE: ABNORMAL

## 2018-06-16 NOTE — PROGRESS NOTES
[facility] the [facility]    -Urine culture is abnormal and grew out bacteria that are sensitive to the antibiotic you have been given.  Complete the medication as prescribed and if you experience new, worsening or persistent symptoms, you should call or return for a recheck.    Electronically Signed By: Janina Vanessa PA-C

## 2018-06-18 ENCOUNTER — TELEPHONE (OUTPATIENT)
Dept: FAMILY MEDICINE | Facility: CLINIC | Age: 48
End: 2018-06-18

## 2018-06-18 NOTE — TELEPHONE ENCOUNTER
Leticia is calling today to check lab results (letter had been sent). I reviewed results from urine culture with her. She has completed antibiotic and her urinary symptoms have resolved but she says she has now developed a yeast infection. Took monistat and thinks it is going to work. Advised this is correct course of action,  and in many people this will work well. If in a few days she feels the OTC medication has not cleared her symptoms to call us back and we can review with Janina Kelly in agreement with the plan. Julia Morrow R.N.

## 2018-11-27 ENCOUNTER — TELEPHONE (OUTPATIENT)
Dept: FAMILY MEDICINE | Facility: CLINIC | Age: 48
End: 2018-11-27

## 2018-11-27 ENCOUNTER — NURSE TRIAGE (OUTPATIENT)
Dept: NURSING | Facility: CLINIC | Age: 48
End: 2018-11-27

## 2018-11-27 DIAGNOSIS — Z13.6 CARDIOVASCULAR SCREENING; LDL GOAL LESS THAN 160: Primary | ICD-10-CM

## 2018-11-27 NOTE — TELEPHONE ENCOUNTER
LH,    Patient is requesting to repeat her Lipid panel.  I pended.  Do you want to add any other labs?    Mary Ann Pérez RN- Triage [employer]

## 2018-11-27 NOTE — TELEPHONE ENCOUNTER
Reason for call;  Please call back Pt to advise . From [phone] Pt called   . Requesting cholesterol  lab test orders because was running high.   Recommendation / teaching ; FNA advised to call back if no answer to message in a timely way.      Caller Verbalizes understanding and denies further questions and will call back if further symptoms to triage or questions  .  Shavonne Elizabeth RN  - [place] Nurse Advisor

## 2018-11-27 NOTE — TELEPHONE ENCOUNTER
Orders signed as recheck from 6 months ago. No other labs required.  Electronically Signed By: Janina Medina PA-C

## 2018-12-03 DIAGNOSIS — Z13.6 CARDIOVASCULAR SCREENING; LDL GOAL LESS THAN 160: ICD-10-CM

## 2018-12-03 LAB
CHOLEST SERPL-MCNC: 305 MG/DL
HDLC SERPL-MCNC: 65 MG/DL
LDLC SERPL CALC-MCNC: 223 MG/DL
NONHDLC SERPL-MCNC: 240 MG/DL
TRIGL SERPL-MCNC: 87 MG/DL

## 2018-12-03 PROCEDURE — 80061 LIPID PANEL: CPT | Performed by: PHYSICIAN ASSISTANT

## 2018-12-03 PROCEDURE — 36415 COLL VENOUS BLD VENIPUNCTURE: CPT | Performed by: PHYSICIAN ASSISTANT

## 2018-12-07 NOTE — PROGRESS NOTES
Please call or write patient with the following results:    -LDL(bad) cholesterol level has continued to be elevated. It appears this had improved for a period of time after changing your diet/life-style habits, but has regressed again. I think it would be a good idea for us to refer you to the nutritionist to see if we can optimize dietary changes. Please let me know if you're in agreement with this and I can put a referral in for you.    Electronically Signed By: Janina Medina PA-C

## 2018-12-11 ENCOUNTER — MYC MEDICAL ADVICE (OUTPATIENT)
Dept: FAMILY MEDICINE | Facility: CLINIC | Age: 48
End: 2018-12-11

## 2018-12-11 DIAGNOSIS — E78.5 HYPERLIPIDEMIA WITH TARGET LDL LESS THAN 130: Primary | ICD-10-CM

## 2018-12-11 NOTE — TELEPHONE ENCOUNTER
Orders signed, please give info on multiple locations and she can pick what's best for her.  Electronically Signed By: Janina Medina PA-C

## 2019-01-08 ENCOUNTER — MYC MEDICAL ADVICE (OUTPATIENT)
Dept: FAMILY MEDICINE | Facility: CLINIC | Age: 49
End: 2019-01-08

## 2019-01-08 NOTE — TELEPHONE ENCOUNTER
Please see [email or web address] message. Please advise. Thank you.  PAULINE CollinsN, RN  [facility]

## 2019-01-18 ENCOUNTER — VIRTUAL VISIT (OUTPATIENT)
Dept: FAMILY MEDICINE | Facility: CLINIC | Age: 49
End: 2019-01-18
Payer: COMMERCIAL

## 2019-01-18 DIAGNOSIS — E78.5 HYPERLIPIDEMIA LDL GOAL <100: Primary | ICD-10-CM

## 2019-01-18 PROCEDURE — 98966 PH1 ASSMT&MGMT NQHP 5-10: CPT | Performed by: PHYSICIAN ASSISTANT

## 2019-01-18 RX ORDER — ATORVASTATIN CALCIUM 10 MG/1
10 TABLET, FILM COATED ORAL DAILY
Qty: 90 TABLET | Refills: 0 | Status: SHIPPED | OUTPATIENT
Start: 2019-01-18 | End: 2019-03-15 | Stop reason: DRUGHIGH

## 2019-01-18 NOTE — PROGRESS NOTES
"Leticia Aquino is a 48 year old female who is being evaluated via a telephone visit.      The patient has been notified of following (by M.A)  Rubia Pelaez MA    \"We have found that certain health care needs can be provided without the need for a physical exam.  This service lets us provide the care you need with a short phone conversation.  If a prescription is necessary we can send it directly to your pharmacy.  If lab work is needed we can place an order for that and you can then stop by our lab to have the test done at a later time.    This telephone visit will be conducted via 3 way call with the you (the patient) , the physician/provider, and a me all on the line at the same time.  This allows your physician/provider to have the phone conversation with you while I will be taking notes for your medical record.  We will have full access to your [facility] medical record during this entire phone call.    Since this is like an office visit,  will bill your insurance company for this service.  Please check with your medical insurance if this type of telephone/virtual is covered . You may be responsible for the cost of this service if insurance coverage is denied.  The typical cost is $30 (10min), $59(11-20min) and $85 (21-30min)     If during the course of the call the physician/provider feels a telephone visit is not appropriate, you will not be charged for this service\"    Consent has been obtained for this service by care team member: yes.  See the scanned image in the medical record.    -not an option to go to a nutritionist because her insurance coverage denied it.     Rubia Pelaez MA        Total time of call between patient and provider was 10 minutes     Electronically Signed By: Janina Medina PA-C    ===================================================    I have reviewed the note as documented above.  This accurately captures the substance of my conversation with the " patient,    Additional provider notes:  Repeat lipids shows persistent LDL elevation: 250, 190 then 223 after making dietary changes. Was able to loose 40 lbs. Starting to try exercising more now too - just started going to the gym again last week.  Pt would like to start statin therapy though given persistent elevations and since insurance does not cover seeing a nutritionist.  FHx: mother with hyperlipidemia on lipid lowering therapy  No hx of liver disease.  Last CMP was normal.    The 10-year ASCVD risk score (Las Vegassirena VAN Jr., et al., 2013) is: 1.3%    Values used to calculate the score:      Age: 48 years      Sex: Female      Is Non- : No      Diabetic: No      Tobacco smoker: No      Systolic Blood Pressure: 118 mmHg      Is BP treated: No      HDL Cholesterol: 65 mg/dL      Total Cholesterol: 305 mg/dL      Assessment/Plan:    ICD-10-CM    1. Hyperlipidemia LDL goal <100 E78.5 atorvastatin (LIPITOR) 10 MG tablet   Pt would like to start statin therapy for hyperlipidemia and reviewed with her that this is consistent with ACC/AHA guidelines given LDL > 190 even though 10 yr ASCVD risk score is < 7.5%.  Risks/benefits of statin therapy reviewed.   Recheck labs prior to physical in March.  Future orders placed.  Encouraged to follow-up sooner withy any symptoms or concerns.  Pt in agreement with plan.     Electronically Signed By: Janina Medina PA-C

## 2019-03-14 DIAGNOSIS — E78.5 HYPERLIPIDEMIA LDL GOAL <100: ICD-10-CM

## 2019-03-14 LAB
ALBUMIN SERPL-MCNC: 4 G/DL (ref 3.4–5)
ALP SERPL-CCNC: 55 U/L (ref 40–150)
ALT SERPL W P-5'-P-CCNC: 33 U/L (ref 0–50)
ANION GAP SERPL CALCULATED.3IONS-SCNC: 6 MMOL/L (ref 3–14)
AST SERPL W P-5'-P-CCNC: 22 U/L (ref 0–45)
BILIRUB SERPL-MCNC: 0.5 MG/DL (ref 0.2–1.3)
BUN SERPL-MCNC: 21 MG/DL (ref 7–30)
CALCIUM SERPL-MCNC: 9.1 MG/DL (ref 8.5–10.1)
CHLORIDE SERPL-SCNC: 108 MMOL/L (ref 94–109)
CHOLEST SERPL-MCNC: 244 MG/DL
CO2 SERPL-SCNC: 24 MMOL/L (ref 20–32)
CREAT SERPL-MCNC: 0.8 MG/DL (ref 0.52–1.04)
GFR SERPL CREATININE-BSD FRML MDRD: 87 ML/MIN/{1.73_M2}
GLUCOSE SERPL-MCNC: 94 MG/DL (ref 70–99)
HDLC SERPL-MCNC: 69 MG/DL
LDLC SERPL CALC-MCNC: 164 MG/DL
NONHDLC SERPL-MCNC: 175 MG/DL
POTASSIUM SERPL-SCNC: 4.2 MMOL/L (ref 3.4–5.3)
PROT SERPL-MCNC: 7.6 G/DL (ref 6.8–8.8)
SODIUM SERPL-SCNC: 138 MMOL/L (ref 133–144)
TRIGL SERPL-MCNC: 53 MG/DL
TSH SERPL DL<=0.005 MIU/L-ACNC: 1.87 MU/L (ref 0.4–4)

## 2019-03-14 PROCEDURE — 84443 ASSAY THYROID STIM HORMONE: CPT | Performed by: PHYSICIAN ASSISTANT

## 2019-03-14 PROCEDURE — 36415 COLL VENOUS BLD VENIPUNCTURE: CPT | Performed by: PHYSICIAN ASSISTANT

## 2019-03-14 PROCEDURE — 80061 LIPID PANEL: CPT | Performed by: PHYSICIAN ASSISTANT

## 2019-03-14 PROCEDURE — 80053 COMPREHEN METABOLIC PANEL: CPT | Performed by: PHYSICIAN ASSISTANT

## 2019-03-15 ENCOUNTER — OFFICE VISIT (OUTPATIENT)
Dept: FAMILY MEDICINE | Facility: CLINIC | Age: 49
End: 2019-03-15
Payer: COMMERCIAL

## 2019-03-15 VITALS
BODY MASS INDEX: 28.16 KG/M2 | DIASTOLIC BLOOD PRESSURE: 82 MMHG | HEIGHT: 62 IN | HEART RATE: 88 BPM | TEMPERATURE: 98.1 F | WEIGHT: 153 LBS | SYSTOLIC BLOOD PRESSURE: 122 MMHG

## 2019-03-15 DIAGNOSIS — Z12.31 ENCOUNTER FOR SCREENING MAMMOGRAM FOR BREAST CANCER: ICD-10-CM

## 2019-03-15 DIAGNOSIS — E03.4 HYPOTHYROIDISM DUE TO ACQUIRED ATROPHY OF THYROID: ICD-10-CM

## 2019-03-15 DIAGNOSIS — E78.5 HYPERLIPIDEMIA LDL GOAL <130: ICD-10-CM

## 2019-03-15 DIAGNOSIS — Z00.01 ENCOUNTER FOR ROUTINE ADULT MEDICAL EXAM WITH ABNORMAL FINDINGS: Primary | ICD-10-CM

## 2019-03-15 DIAGNOSIS — Z23 NEED FOR PROPHYLACTIC VACCINATION WITH TETANUS-DIPHTHERIA (TD): ICD-10-CM

## 2019-03-15 DIAGNOSIS — Z12.4 SCREENING FOR MALIGNANT NEOPLASM OF CERVIX: ICD-10-CM

## 2019-03-15 DIAGNOSIS — Z23 NEED FOR PROPHYLACTIC VACCINATION AND INOCULATION AGAINST INFLUENZA: ICD-10-CM

## 2019-03-15 DIAGNOSIS — N94.3 PREMENSTRUAL TENSION SYNDROME: ICD-10-CM

## 2019-03-15 DIAGNOSIS — L98.9 SKIN LESION: ICD-10-CM

## 2019-03-15 PROCEDURE — G0145 SCR C/V CYTO,THINLAYER,RESCR: HCPCS | Performed by: PHYSICIAN ASSISTANT

## 2019-03-15 PROCEDURE — 90714 TD VACC NO PRESV 7 YRS+ IM: CPT | Performed by: PHYSICIAN ASSISTANT

## 2019-03-15 PROCEDURE — 90471 IMMUNIZATION ADMIN: CPT | Performed by: PHYSICIAN ASSISTANT

## 2019-03-15 PROCEDURE — 99396 PREV VISIT EST AGE 40-64: CPT | Mod: 25 | Performed by: PHYSICIAN ASSISTANT

## 2019-03-15 PROCEDURE — 87624 HPV HI-RISK TYP POOLED RSLT: CPT | Performed by: PHYSICIAN ASSISTANT

## 2019-03-15 RX ORDER — LEVOTHYROXINE SODIUM 100 UG/1
100 TABLET ORAL DAILY
Qty: 90 TABLET | Refills: 3 | Status: SHIPPED | OUTPATIENT
Start: 2019-03-15 | End: 2019-07-23

## 2019-03-15 RX ORDER — ATORVASTATIN CALCIUM 20 MG/1
20 TABLET, FILM COATED ORAL DAILY
Qty: 90 TABLET | Refills: 0 | Status: SHIPPED | OUTPATIENT
Start: 2019-03-15 | End: 2019-06-13

## 2019-03-15 ASSESSMENT — PATIENT HEALTH QUESTIONNAIRE - PHQ9
SUM OF ALL RESPONSES TO PHQ QUESTIONS 1-9: 1
5. POOR APPETITE OR OVEREATING: NOT AT ALL

## 2019-03-15 ASSESSMENT — ANXIETY QUESTIONNAIRES
7. FEELING AFRAID AS IF SOMETHING AWFUL MIGHT HAPPEN: NOT AT ALL
IF YOU CHECKED OFF ANY PROBLEMS ON THIS QUESTIONNAIRE, HOW DIFFICULT HAVE THESE PROBLEMS MADE IT FOR YOU TO DO YOUR WORK, TAKE CARE OF THINGS AT HOME, OR GET ALONG WITH OTHER PEOPLE: NOT DIFFICULT AT ALL
1. FEELING NERVOUS, ANXIOUS, OR ON EDGE: SEVERAL DAYS
6. BECOMING EASILY ANNOYED OR IRRITABLE: SEVERAL DAYS
3. WORRYING TOO MUCH ABOUT DIFFERENT THINGS: NOT AT ALL
2. NOT BEING ABLE TO STOP OR CONTROL WORRYING: NOT AT ALL
GAD7 TOTAL SCORE: 2
5. BEING SO RESTLESS THAT IT IS HARD TO SIT STILL: NOT AT ALL

## 2019-03-15 ASSESSMENT — MIFFLIN-ST. JEOR: SCORE: 1277.25

## 2019-03-15 NOTE — PROGRESS NOTES
"  SUBJECTIVE:                                                    Leticia Aquino is a 48 year old female who presents to clinic today for the following health issues:      Hyperlipidemia Follow-Up      Rate your low fat/cholesterol diet?: fair    Taking statin?  Yes, no muscle aches from statin    Other lipid medications/supplements?:  none    Hypothyroidism Follow-up      Since last visit, patient describes the following symptoms: Weight stable, no hair loss, no skin changes, no constipation, no loose stools    Bumps on hand x 6 months.      Problem list and histories reviewed & adjusted, as indicated.  Additional history: {NONE - AS DOCUMENTED:070072::\"as documented\"}    {HIST REVIEW/ LINKS 2:619989}    {PROVIDER CHARTING PREFERENCE:629827}      "

## 2019-03-15 NOTE — PROGRESS NOTES
SUBJECTIVE:   CC: Leticia Aquino is an 48 year old woman who presents for preventive health visit.     Healthy Habits:    Do you get at least three servings of calcium containing foods daily (dairy, green leafy vegetables, etc.)? yes    Amount of exercise or daily activities, outside of work: 3 day(s) per week    Problems taking medications regularly No    Medication side effects: No    Have you had an eye exam in the past two years? yes    Do you see a dentist twice per year? yes    Do you have sleep apnea, excessive snoring or daytime drowsiness?no      1) Hyperlipidemia Follow-Up; doing well without any issues.   Review results today - see below.       Rate your low fat/cholesterol diet?: good    Taking statin?  Yes, no muscle aches from statin    Other lipid medications/supplements?:  none    2) Hypothyroidism Follow-up      Since last visit, patient describes the following symptoms: Weight stable, no hair loss, no skin changes, no constipation, no loose stools    3) Refill prozac. Has taken for a little bit of both anxiety and depression. Originally started to have issues around her period, but then was more of a problem with menopause. Supra-cervical hysterectomy in 2012. No hx of abnormal pap. Feels very stable on it though and would like to continue without changes.     4) Bumps on both hands x 6 months. 2mm in size bilateral web spaces of hands.       Results for orders placed or performed in visit on 03/14/19   TSH with free T4 reflex   Result Value Ref Range    TSH 1.87 0.40 - 4.00 mU/L   Comprehensive metabolic panel   Result Value Ref Range    Sodium 138 133 - 144 mmol/L    Potassium 4.2 3.4 - 5.3 mmol/L    Chloride 108 94 - 109 mmol/L    Carbon Dioxide 24 20 - 32 mmol/L    Anion Gap 6 3 - 14 mmol/L    Glucose 94 70 - 99 mg/dL    Urea Nitrogen 21 7 - 30 mg/dL    Creatinine 0.80 0.52 - 1.04 mg/dL    GFR Estimate 87 >60 mL/min/[1.73_m2]    GFR Estimate If Black >90 >60 mL/min/[1.73_m2]     Calcium 9.1 8.5 - 10.1 mg/dL    Bilirubin Total 0.5 0.2 - 1.3 mg/dL    Albumin 4.0 3.4 - 5.0 g/dL    Protein Total 7.6 6.8 - 8.8 g/dL    Alkaline Phosphatase 55 40 - 150 U/L    ALT 33 0 - 50 U/L    AST 22 0 - 45 U/L   Lipid panel reflex to direct LDL Fasting   Result Value Ref Range    Cholesterol 244 (H) <200 mg/dL    Triglycerides 53 <150 mg/dL    HDL Cholesterol 69 >49 mg/dL    LDL Cholesterol Calculated 164 (H) <100 mg/dL    Non HDL Cholesterol 175 (H) <130 mg/dL       Today's PHQ-2 Score:   PHQ-2 (  [employer]) 3/15/2019 2018   Q1: Little interest or pleasure in doing things 0 0   Q2: Feeling down, depressed or hopeless 0 0   PHQ-2 Score 0 0       Abuse: Current or Past(Physical, Sexual or Emotional)- No  Do you feel safe in your environment? Yes    Social History     Tobacco Use     Smoking status: Former Smoker     Smokeless tobacco: Never Used     Tobacco comment: QUIT    Substance Use Topics     Alcohol use: Yes     Comment: 2-3 per week      If you drink alcohol do you typically have >3 drinks per day or >7 drinks per week? Not Applicable                     Reviewed orders with patient.  Reviewed health maintenance and updated orders accordingly - Yes  Patient Active Problem List   Diagnosis     Iron deficiency anemia secondary to inadequate dietary iron intake     Premenstrual tension syndrome     CARDIOVASCULAR SCREENING; LDL GOAL LESS THAN 160     Pain in joint, ankle and foot     Hypothyroidism due to acquired atrophy of thyroid     Past Surgical History:   Procedure Laterality Date     C NONSPECIFIC PROCEDURE  1973    Right Inguinal Hernia Repair     C NONSPECIFIC PROCEDURE      D&C for Spontaneous      GYN SURGERY  11    ablation     HYSTERECTOMY SUPRACERVICAL  2012       Social History     Tobacco Use     Smoking status: Former Smoker     Smokeless tobacco: Never Used     Tobacco comment: QUIT    Substance Use Topics     Alcohol use: Yes     Comment: 2-3 per week       Family History   Problem Relation Age of Onset     Diabetes Father         Insulin Dependent     Cardiovascular Father         Triple by-pass in [id number]     Thyroid Disease Mother         Hypothyroid     Cancer Maternal Grandfather      Thyroid Disease Daughter         Dx at age 10         Current Outpatient Medications   Medication Sig Dispense Refill     acyclovir (ZOVIRAX) 200 MG capsule TAKE ONE CAPSULE BY MOUTH 5 TIMES DAILY 25 capsule 0     FLUoxetine (PROZAC) 20 MG capsule Take 2 capsules (40 mg) by mouth daily 180 capsule 3     IBUPROFEN 600 MG OR TABS take 1 every 8 hrs if needed  60 prn     levothyroxine (SYNTHROID/LEVOTHROID) 100 MCG tablet Take 1 tablet (100 mcg) by mouth daily 90 tablet 3     Allergies   Allergen Reactions     Augmentin GI Disturbance     Codeine      gi     Pseudoephedrine Hcl      restlessness       Mammogram Screening: Patient under age 50, mutual decision reflected in health maintenance.      Pertinent mammograms are reviewed under the imaging tab.  History of abnormal Pap smear: NO - age 30-65 PAP every 5 years with negative HPV co-testing recommended  PAP / HPV 2/28/2014 5/3/2006 4/20/2005   PAP NIL NIL NIL     Reviewed and updated as needed this visit by clinical staff  Tobacco  Allergies  Meds  Med Hx  Surg Hx  Fam Hx  Soc Hx        Reviewed and updated as needed this visit by Provider            ROS:  CONSTITUTIONAL: NEGATIVE for fever, chills, change in weight  INTEGUMENTARY/SKIN: + for skin spots on her hands. Would like to have checked as doesn't like thembeing there. Not really causing issues otherwise. Had a spot taken off her head in the past which what sounds like curettage.   EYES: NEGATIVE for vision changes or irritation  ENT: NEGATIVE for ear, mouth and throat problems  RESP: NEGATIVE for significant cough or SOB  BREAST: NEGATIVE for masses, tenderness or discharge  CV: NEGATIVE for chest pain, palpitations or peripheral edema  GI: NEGATIVE for nausea,  "abdominal pain, heartburn, or change in bowel habits  : NEGATIVE for unusual urinary or vaginal symptoms. No vaginal bleeding due to partial hysterectomy.   MUSCULOSKELETAL: NEGATIVE for significant arthralgias or myalgia  NEURO: NEGATIVE for weakness, dizziness or paresthesias  PSYCHIATRIC: NEGATIVE for changes in mood or affect     OBJECTIVE:   /82 (BP Location: Right arm, Patient Position: Chair, Cuff Size: Adult Regular)   Pulse 88   Temp 98.1  F (36.7  C) (Oral)   Ht 1.575 m (5' 2\")   Wt 69.4 kg (153 lb)   LMP 09/13/2012   BMI 27.98 kg/m    EXAM:  GENERAL: healthy, alert and no distress  EYES: Eyes grossly normal to inspection, PERRL and conjunctivae and sclerae normal  HENT: ear canals and TM's normal, nose and mouth without ulcers or lesions  NECK: no adenopathy, no asymmetry, masses, or scars and thyroid normal to palpation  RESP: lungs clear to auscultation - no rales, rhonchi or wheezes  BREAST: deferred  CV: regular rate and rhythm, normal S1 S2, no S3 or S4, no murmur, click or rub, no peripheral edema and peripheral pulses strong  ABDOMEN: soft, nontender, no hepatosplenomegaly, no masses and bowel sounds normal   (female): normal female external genitalia, normal urethral meatus, vaginal mucosa pink, moist, well rugated, and normal cervix/adnexa/uterus without masses or discharge  MS: no gross musculoskeletal defects noted, no edema  SKIN: pt shows me 2 tiny flesh-colored to light brown papules with one of each located along interdigital webspace of dorsal hands. No erythema or scaling. no suspicious lesions or rashes  NEURO: Normal strength and tone, mentation intact and speech normal  PSYCH: mentation appears normal, affect normal/bright    Diagnostic Test Results:  none     ASSESSMENT/PLAN:       ICD-10-CM    1. Encounter for routine adult medical exam with abnormal findings Z00.01    2. Screening for malignant neoplasm of cervix Z12.4 Pap imaged thin layer screen with HPV - " recommended age 30 - 65 years (select HPV order below)     HPV High Risk Types DNA Cervical   3. Need for prophylactic vaccination and inoculation against influenza Z23      ADMIN VACCINE, FIRST [95193]   4. Need for prophylactic vaccination with tetanus-diphtheria (Td) Z23 TD (ADULT, 7+) PRESERVE FREE   5. Hyperlipidemia LDL goal <130 E78.5 atorvastatin (LIPITOR) 20 MG tablet     Lipid panel reflex to direct LDL Fasting   6. Hypothyroidism due to acquired atrophy of thyroid E03.4 levothyroxine (SYNTHROID/LEVOTHROID) 100 MCG tablet   7. Encounter for screening mammogram for breast cancer Z12.31 *MA Screening Digital Bilateral   8. Premenstrual tension syndrome N94.3 FLUoxetine (PROZAC) 20 MG capsule   9. Skin lesion L98.9    History of premenstrual tension syndrome then menopausal mood changes. Very stable on prozac and would like to continue without changes. Refilled for 1 yr.  Reviewed labs drawn prior to appt - LDL did improve after addition of lipitor, but will increase for further benefit as not yet at goa. Repeat labs in 3 months.  Thyroid stable - levothyroxine refilled.  Pt showed me to tiny flesh colored to brown papules on bilateral hands that appear to be possibly early nevi. She will continue to monitor, but given the presence of them are bothersome to her we also discussed referral to [facility] skin clinic and she will let me know if she'd like to do this.  She will also let me know if wishes to see cancer risk screening group at [facility] given half sister with breast cancer in her 40's. Advised SBE and annual mammography.     COUNSELING:   Reviewed preventive health counseling, as reflected in patient instructions       Regular exercise       Healthy diet/nutrition       Immunizations    Vaccinated for: Td and Declined: Influenza due to Other would like to wait until next season.            BP Readings from Last 1 Encounters:   03/15/19 122/82     Estimated body mass index is 27.98 kg/m  as calculated from the  "following:    Height as of this encounter: 1.575 m (5' 2\").    Weight as of this encounter: 69.4 kg (153 lb).      Weight management plan: Discussed healthy diet and exercise guidelines     reports that she has quit smoking. she has never used smokeless tobacco.      Counseling Resources:  ATP IV Guidelines  Pooled Cohorts Equation Calculator  Breast Cancer Risk Calculator  FRAX Risk Assessment  ICSI Preventive Guidelines  Dietary Guidelines for Americans, 2010  USDA's MyPlate  ASA Prophylaxis  Lung CA Screening    Janina Medina PA-C  [facility] [place]  "

## 2019-03-16 ASSESSMENT — ANXIETY QUESTIONNAIRES: GAD7 TOTAL SCORE: 2

## 2019-03-18 ENCOUNTER — MYC REFILL (OUTPATIENT)
Dept: FAMILY MEDICINE | Facility: CLINIC | Age: 49
End: 2019-03-18

## 2019-03-18 DIAGNOSIS — N94.3 PREMENSTRUAL TENSION SYNDROME: ICD-10-CM

## 2019-03-18 DIAGNOSIS — E78.5 HYPERLIPIDEMIA LDL GOAL <130: ICD-10-CM

## 2019-03-18 DIAGNOSIS — E03.4 HYPOTHYROIDISM DUE TO ACQUIRED ATROPHY OF THYROID: ICD-10-CM

## 2019-03-18 DIAGNOSIS — B00.1 RECURRENT COLD SORES: ICD-10-CM

## 2019-03-18 RX ORDER — LEVOTHYROXINE SODIUM 100 UG/1
100 TABLET ORAL DAILY
Qty: 90 TABLET | Refills: 3 | Status: CANCELLED | OUTPATIENT
Start: 2019-03-18

## 2019-03-18 RX ORDER — ATORVASTATIN CALCIUM 20 MG/1
20 TABLET, FILM COATED ORAL DAILY
Qty: 90 TABLET | Refills: 0 | Status: CANCELLED | OUTPATIENT
Start: 2019-03-18

## 2019-03-18 NOTE — RESULT ENCOUNTER NOTE
Result(s) was/were reviewed in the clinic with patient at time of appointment on 3/15.  Electronically Signed By: Janina Medina PA-C

## 2019-03-18 NOTE — TELEPHONE ENCOUNTER
"Requested Prescriptions   Pending Prescriptions Disp Refills     atorvastatin (LIPITOR) 20 MG tablet  This may be a duplicate refill request.  Last Written Prescription Date:  3/15/2019  Last Fill Quantity: 90 tablet,  # refills: 0   Last office visit: 3/15/2019 with prescribing provider:  Carol     Future Office Visit:       90 tablet 0     Sig: Take 1 tablet (20 mg) by mouth daily    Statins Protocol Passed - 3/18/2019 10:58 AM       Passed - LDL on file in past 12 months    Recent Labs   Lab Test 03/14/19  0810   *            Passed - No abnormal creatine kinase in past 12 months    No lab results found.            Passed - Recent (12 mo) or future (30 days) visit within the authorizing provider's specialty    Patient had office visit in the last 12 months or has a visit in the next 30 days with authorizing provider or within the authorizing provider's specialty.  See \"Patient Info\" tab in inIDxet, or \"Choose Columns\" in Meds & Orders section of the refill encounter.             Passed - Medication is active on med list       Passed - Patient is age 18 or older       Passed - No active pregnancy on record       Passed - No positive pregnancy test in past 12 months                  levothyroxine (SYNTHROID/LEVOTHROID) 100 MCG tablet  This may be a duplicate refill request.  Last Written Prescription Date:  3/15/2019  Last Fill Quantity: 90 tablet,  # refills: 3   Last office visit: 3/15/2019 with prescribing provider:  Carol     Future Office Visit:       90 tablet 3     Sig: Take 1 tablet (100 mcg) by mouth daily    Thyroid Protocol Passed - 3/18/2019 10:58 AM       Passed - Patient is 12 years or older       Passed - Recent (12 mo) or future (30 days) visit within the authorizing provider's specialty    Patient had office visit in the last 12 months or has a visit in the next 30 days with authorizing provider or within the authorizing provider's specialty.  See \"Patient Info\" tab in inIDxet, or " "\"Choose Columns\" in Meds & Orders section of the refill encounter.             Passed - Medication is active on med list       Passed - Normal TSH on file in past 12 months    Recent Labs   Lab Test 03/14/19  0810   TSH 1.87             Passed - No active pregnancy on record    If patient is pregnant or has had a positive pregnancy test, please check TSH.         Passed - No positive pregnancy test in past 12 months    If patient is pregnant or has had a positive pregnancy test, please check TSH.                    FLUoxetine (PROZAC) 20 MG capsule  This may be a duplicate refill request.  Last Written Prescription Date:  3/15/2019  Last Fill Quantity: 180 capsule,  # refills: 3   Last office visit: 3/15/2019 with prescribing provider:  Carol     Future Office Visit:         180 capsule 3     Sig: Take 2 capsules (40 mg) by mouth daily    SSRIs Protocol Passed - 3/18/2019 10:58 AM    PHQ-9 SCORE 6/22/2015 6/5/2018 3/15/2019   PHQ-9 Total Score 4 - -   PHQ-9 Total Score - 1 1     RENETTA-7 SCORE 6/22/2015 6/5/2018 3/15/2019   Total Score 0 - -   Total Score - 0 2            Passed - Recent (12 mo) or future (30 days) visit within the authorizing provider's specialty    Patient had office visit in the last 12 months or has a visit in the next 30 days with authorizing provider or within the authorizing provider's specialty.  See \"Patient Info\" tab in inbasket, or \"Choose Columns\" in Meds & Orders section of the refill encounter.             Passed - Medication is active on med list       Passed - Patient is age 18 or older       Passed - No active pregnancy on record       Passed - No positive pregnancy test in last 12 months        "

## 2019-03-18 NOTE — TELEPHONE ENCOUNTER
"Requested Prescriptions   Pending Prescriptions Disp Refills     acyclovir (ZOVIRAX) 200 MG capsule  Last Written Prescription Date:  11/28/2017  Last Fill Quantity: 25 capsule,  # refills: 0   Last office visit: 3/15/2019 with prescribing provider:  Carol     Future Office Visit:       25 capsule 0    Antivirals for Herpes Protocol Passed - 3/18/2019 10:58 AM       Passed - Patient is age 12 or older       Passed - Recent (12 mo) or future (30 days) visit within the authorizing provider's specialty    Patient had office visit in the last 12 months or has a visit in the next 30 days with authorizing provider or within the authorizing provider's specialty.  See \"Patient Info\" tab in inbasket, or \"Choose Columns\" in Meds & Orders section of the refill encounter.             Passed - Medication is active on med list       Passed - Normal serum creatinine on file in past 12 months    Recent Labs   Lab Test 03/14/19  0810   CR 0.80             "

## 2019-03-19 LAB
COPATH REPORT: NORMAL
PAP: NORMAL

## 2019-03-19 RX ORDER — ACYCLOVIR 200 MG/1
200 CAPSULE ORAL
Qty: 25 CAPSULE | Refills: 1 | Status: SHIPPED | OUTPATIENT
Start: 2019-03-19 | End: 2020-10-28

## 2019-03-19 NOTE — TELEPHONE ENCOUNTER
Duplicate requests. Rx's were filled at office visit on 3/15/19.  PAULINE CollinsN, RN  [facility]

## 2019-03-19 NOTE — TELEPHONE ENCOUNTER
Prescription approved per [facility] Refill Protocol.  PAULINE CollinsN, RN  [facility]

## 2019-03-20 LAB
FINAL DIAGNOSIS: NORMAL
HPV HR 12 DNA CVX QL NAA+PROBE: NEGATIVE
HPV16 DNA SPEC QL NAA+PROBE: NEGATIVE
HPV18 DNA SPEC QL NAA+PROBE: NEGATIVE
SPECIMEN DESCRIPTION: NORMAL
SPECIMEN SOURCE CVX/VAG CYTO: NORMAL

## 2019-03-21 ENCOUNTER — MYC MEDICAL ADVICE (OUTPATIENT)
Dept: FAMILY MEDICINE | Facility: CLINIC | Age: 49
End: 2019-03-21

## 2019-03-21 DIAGNOSIS — Z12.31 ENCOUNTER FOR SCREENING MAMMOGRAM FOR BREAST CANCER: Primary | ICD-10-CM

## 2019-03-21 NOTE — TELEPHONE ENCOUNTER
Please see [employer] message. Please advise. Thank you.  PAULINE CollinsN, RN  [facility]

## 2019-06-06 ENCOUNTER — HOSPITAL ENCOUNTER (OUTPATIENT)
Dept: MAMMOGRAPHY | Facility: CLINIC | Age: 49
Discharge: HOME | End: 2019-06-06
Attending: PHYSICIAN ASSISTANT | Admitting: PHYSICIAN ASSISTANT
Payer: COMMERCIAL

## 2019-06-06 DIAGNOSIS — Z12.31 ENCOUNTER FOR SCREENING MAMMOGRAM FOR BREAST CANCER: ICD-10-CM

## 2019-06-06 PROCEDURE — 77063 BREAST TOMOSYNTHESIS BI: CPT

## 2019-06-13 DIAGNOSIS — E78.5 HYPERLIPIDEMIA LDL GOAL <130: ICD-10-CM

## 2019-06-13 RX ORDER — ATORVASTATIN CALCIUM 20 MG/1
20 TABLET, FILM COATED ORAL DAILY
Qty: 30 TABLET | Refills: 0 | Status: SHIPPED | OUTPATIENT
Start: 2019-06-13 | End: 2019-06-20

## 2019-06-13 NOTE — TELEPHONE ENCOUNTER
Medication is being filled for 1 time refill only due to:  Patient needs labs fasting lipids.   PAULINE CollinsN, RN  [facility]

## 2019-06-13 NOTE — TELEPHONE ENCOUNTER
"Requested Prescriptions   Pending Prescriptions Disp Refills     atorvastatin (LIPITOR) 20 MG tablet 90 tablet 0     Sig: Take 1 tablet (20 mg) by mouth daily   Last Written Prescription Date:  3/15/19  Last Fill Quantity: 90,  # refills: 0   Last office visit: 3/15/2019 with prescribing provider:  Carol   Future Office Visit:   Next 5 appointments (look out 90 days)    Jun 21, 2019  9:20 AM CDT  Office Visit with Janina Medina PA-C  [facility] [address] [facility] [address]  [phone]             Statins Protocol Passed - 6/13/2019  1:42 PM        Passed - LDL on file in past 12 months     Recent Labs   Lab Test 03/14/19  0810   *             Passed - No abnormal creatine kinase in past 12 months     No lab results found.             Passed - Recent (12 mo) or future (30 days) visit within the authorizing provider's specialty     Patient had office visit in the last 12 months or has a visit in the next 30 days with authorizing provider or within the authorizing provider's specialty.  See \"Patient Info\" tab in inbasket, or \"Choose Columns\" in Meds & Orders section of the refill encounter.              Passed - Medication is active on med list        Passed - Patient is age 18 or older        Passed - No active pregnancy on record        Passed - No positive pregnancy test in past 12 months          "

## 2019-06-14 DIAGNOSIS — E78.5 HYPERLIPIDEMIA LDL GOAL <130: ICD-10-CM

## 2019-06-14 LAB
CHOLEST SERPL-MCNC: 265 MG/DL
HDLC SERPL-MCNC: 73 MG/DL
LDLC SERPL CALC-MCNC: 179 MG/DL
NONHDLC SERPL-MCNC: 192 MG/DL
TRIGL SERPL-MCNC: 67 MG/DL

## 2019-06-14 PROCEDURE — 36415 COLL VENOUS BLD VENIPUNCTURE: CPT | Performed by: PHYSICIAN ASSISTANT

## 2019-06-14 PROCEDURE — 80061 LIPID PANEL: CPT | Performed by: PHYSICIAN ASSISTANT

## 2019-06-16 NOTE — RESULT ENCOUNTER NOTE
Dear Leticia,      Your recent test results are noted below:    -Cholesterol levels (LDL,HDL, Triglycerides) are a bit worse from previous. Have you been taking the new dose of lipitor 20mg daily consistently? If not, then I would recommend repeating labs once taking consistently for 6-8 weeks. If so, then we may need to consider high-intensity dose range and increase your lipitor to 40mg daily. Please check back in and let us know how things are going.    For additional lab test information, labtestsonline.org is an excellent reference. Please contact the clinic at [phone] with any further questions or concerns.    Sincerely,      Janina Medina PA-C  [facility]

## 2019-06-17 ENCOUNTER — MYC MEDICAL ADVICE (OUTPATIENT)
Dept: FAMILY MEDICINE | Facility: CLINIC | Age: 49
End: 2019-06-17

## 2019-06-17 DIAGNOSIS — E78.5 HYPERLIPIDEMIA LDL GOAL <130: Primary | ICD-10-CM

## 2019-06-19 DIAGNOSIS — E78.5 HYPERLIPIDEMIA LDL GOAL <130: ICD-10-CM

## 2019-06-19 NOTE — TELEPHONE ENCOUNTER
"Requested Prescriptions   Pending Prescriptions Disp Refills     atorvastatin (LIPITOR) 20 MG tablet  New Pharmacy  Requesting a 90-Day Supply  Last Written Prescription Date:  6/13/2019  Last Fill Quantity: 30 tablet,  # refills: 0   Last office visit: 3/15/2019 with prescribing provider:  Carol     Future Office Visit:       30 tablet 0     Sig: Take 1 tablet (20 mg) by mouth daily       Statins Protocol Passed - 6/19/2019  8:44 AM        Passed - LDL on file in past 12 months     Recent Labs   Lab Test 06/14/19  0914   *             Passed - No abnormal creatine kinase in past 12 months     No lab results found.             Passed - Recent (12 mo) or future (30 days) visit within the authorizing provider's specialty     Patient had office visit in the last 12 months or has a visit in the next 30 days with authorizing provider or within the authorizing provider's specialty.  See \"Patient Info\" tab in inbasket, or \"Choose Columns\" in Meds & Orders section of the refill encounter.              Passed - Medication is active on med list        Passed - Patient is age 18 or older        Passed - No active pregnancy on record        Passed - No positive pregnancy test in past 12 months        "

## 2019-06-20 RX ORDER — ATORVASTATIN CALCIUM 20 MG/1
20 TABLET, FILM COATED ORAL DAILY
Qty: 90 TABLET | Refills: 0 | Status: SHIPPED | OUTPATIENT
Start: 2019-06-20 | End: 2019-08-09 | Stop reason: DRUGHIGH

## 2019-06-20 NOTE — TELEPHONE ENCOUNTER
Prescription approved per [facility] Refill Protocol.      Dalila Larsen RN on 6/20/2019 at 10:15 AM

## 2019-07-23 DIAGNOSIS — E03.4 HYPOTHYROIDISM DUE TO ACQUIRED ATROPHY OF THYROID: ICD-10-CM

## 2019-07-23 DIAGNOSIS — N94.3 PREMENSTRUAL TENSION SYNDROME: ICD-10-CM

## 2019-07-23 NOTE — TELEPHONE ENCOUNTER
Patient is requesting rx's be sent to a different mail order. Mail order does not take E-scribe. I have updated number of refills to match current prescriptions. Please print and sign, then place in TC in-basket to fax. Thank you.  Eli Garrett, PAULINEN, RN  [facility]

## 2019-07-23 NOTE — TELEPHONE ENCOUNTER
"Requested Prescriptions   Pending Prescriptions Disp Refills     levothyroxine (SYNTHROID/LEVOTHROID) 100 MCG tablet  Last Written Prescription Date:  3/15/2019  Last Fill Quantity: 90 tablet,  # refills: 3   Last office visit: 3/15/2019 with prescribing provider:  Carol     Future Office Visit:       90 tablet 3     Sig: Take 1 tablet (100 mcg) by mouth daily       Thyroid Protocol Passed - 7/23/2019  1:31 PM        Passed - Patient is 12 years or older        Passed - Recent (12 mo) or future (30 days) visit within the authorizing provider's specialty     Patient had office visit in the last 12 months or has a visit in the next 30 days with authorizing provider or within the authorizing provider's specialty.  See \"Patient Info\" tab in inbasket, or \"Choose Columns\" in Meds & Orders section of the refill encounter.              Passed - Medication is active on med list        Passed - Normal TSH on file in past 12 months     Recent Labs   Lab Test 03/14/19  0810   TSH 1.87              Passed - No active pregnancy on record     If patient is pregnant or has had a positive pregnancy test, please check TSH.          Passed - No positive pregnancy test in past 12 months     If patient is pregnant or has had a positive pregnancy test, please check TSH.                    FLUoxetine (PROZAC) 20 MG capsule  Last Written Prescription Date:  3/15/2019  Last Fill Quantity: 180 capsule,  # refills: 3   Last office visit: 3/15/2019 with prescribing provider:  Carol Mendes Office Visit:       180 capsule 3     Sig: Take 2 capsules (40 mg) by mouth daily       SSRIs Protocol Passed - 7/23/2019  1:31 PM    PHQ-9 SCORE 6/22/2015 6/5/2018 3/15/2019   PHQ-9 Total Score 4 - -   PHQ-9 Total Score - 1 1     RENETTA-7 SCORE 6/22/2015 6/5/2018 3/15/2019   Total Score 0 - -   Total Score - 0 2             Passed - Recent (12 mo) or future (30 days) visit within the authorizing provider's specialty     Patient had office visit in the " "last 12 months or has a visit in the next 30 days with authorizing provider or within the authorizing provider's specialty.  See \"Patient Info\" tab in inbasket, or \"Choose Columns\" in Meds & Orders section of the refill encounter.              Passed - Medication is active on med list        Passed - Patient is age 18 or older        Passed - No active pregnancy on record        Passed - No positive pregnancy test in last 12 months        "

## 2019-07-24 RX ORDER — LEVOTHYROXINE SODIUM 100 UG/1
100 TABLET ORAL DAILY
Qty: 90 TABLET | Refills: 2 | Status: SHIPPED | OUTPATIENT
Start: 2019-07-24 | End: 2020-05-05

## 2019-08-02 DIAGNOSIS — E78.5 HYPERLIPIDEMIA LDL GOAL <130: ICD-10-CM

## 2019-08-02 LAB
CHOLEST SERPL-MCNC: 250 MG/DL
HDLC SERPL-MCNC: 73 MG/DL
LDLC SERPL CALC-MCNC: 159 MG/DL
NONHDLC SERPL-MCNC: 177 MG/DL
TRIGL SERPL-MCNC: 88 MG/DL

## 2019-08-02 PROCEDURE — 80061 LIPID PANEL: CPT | Performed by: PHYSICIAN ASSISTANT

## 2019-08-02 PROCEDURE — 36415 COLL VENOUS BLD VENIPUNCTURE: CPT | Performed by: PHYSICIAN ASSISTANT

## 2019-08-08 ENCOUNTER — MYC REFILL (OUTPATIENT)
Dept: FAMILY MEDICINE | Facility: CLINIC | Age: 49
End: 2019-08-08

## 2019-08-08 DIAGNOSIS — E78.5 HYPERLIPIDEMIA LDL GOAL <130: ICD-10-CM

## 2019-08-08 RX ORDER — ATORVASTATIN CALCIUM 20 MG/1
20 TABLET, FILM COATED ORAL DAILY
Qty: 90 TABLET | Refills: 0 | Status: CANCELLED | OUTPATIENT
Start: 2019-08-08

## 2019-08-08 NOTE — TELEPHONE ENCOUNTER
Please see the following MyChart comment along with refill request. Please advise on results and medication:  Patient Comment: I had my labs ran and results are back, i need to get a refill either at same dosage or possibly increased.  Please advise.  PAULINE CollinsN, RN  [facility]

## 2019-08-09 RX ORDER — ATORVASTATIN CALCIUM 40 MG/1
40 TABLET, FILM COATED ORAL DAILY
Qty: 90 TABLET | Refills: 0 | Status: SHIPPED | OUTPATIENT
Start: 2019-08-09 | End: 2019-08-21

## 2019-08-09 NOTE — RESULT ENCOUNTER NOTE
Result(s) was/were reviewed with pt, see SaveUp message for details.   Electronically Signed By: Janina Medina PA-C

## 2019-08-09 NOTE — TELEPHONE ENCOUNTER
LDL has improved, but is still a bit above goal range. Would like to see this ideally < 130. Would have her increase to 40mg once daily. New script sent to the pharmacy for her. She can take two of the 20mg pills until gone then  new script, but remind this is only then 1 pill daily not two with the new dosage. We should repeat her lipids again in another 3 months or so.  Electronically Signed By: Janina Medina PA-C

## 2019-08-12 NOTE — TELEPHONE ENCOUNTER
I spoke to Leticia, below information given, she will set reminder to call for future lab appointment in 3 mos. Order in already in [facility]. Julia Morrow R.N.

## 2019-08-21 DIAGNOSIS — E78.5 HYPERLIPIDEMIA LDL GOAL <130: ICD-10-CM

## 2019-08-21 RX ORDER — ATORVASTATIN CALCIUM 40 MG/1
40 TABLET, FILM COATED ORAL DAILY
Qty: 90 TABLET | Refills: 1 | Status: SHIPPED | OUTPATIENT
Start: 2019-08-21 | End: 2020-02-19

## 2019-08-21 NOTE — TELEPHONE ENCOUNTER
"Requested Prescriptions   Pending Prescriptions Disp Refills     atorvastatin (LIPITOR) 40 MG tablet  New Pharmacy  Last Written Prescription Date:  8/9/2019  Last Fill Quantity: 90 tablet,  # refills: 0   Last office visit: 3/15/2019 with prescribing provider:  Carol     Future Office Visit:       90 tablet 0     Sig: Take 1 tablet (40 mg) by mouth daily       Statins Protocol Passed - 8/21/2019  8:06 AM        Passed - LDL on file in past 12 months     Recent Labs   Lab Test 08/02/19  0907   *             Passed - No abnormal creatine kinase in past 12 months     No lab results found.             Passed - Recent (12 mo) or future (30 days) visit within the authorizing provider's specialty     Patient had office visit in the last 12 months or has a visit in the next 30 days with authorizing provider or within the authorizing provider's specialty.  See \"Patient Info\" tab in inbasket, or \"Choose Columns\" in Meds & Orders section of the refill encounter.              Passed - Medication is active on med list        Passed - Patient is age 18 or older        Passed - No active pregnancy on record        Passed - No positive pregnancy test in past 12 months        "

## 2019-08-21 NOTE — TELEPHONE ENCOUNTER
Filled per [facility] protocol. Pharmacy change    PAULINE CollazoN, RN  [employer] [facility]

## 2019-10-07 ENCOUNTER — OFFICE VISIT (OUTPATIENT)
Dept: FAMILY MEDICINE | Facility: CLINIC | Age: 49
End: 2019-10-07
Payer: COMMERCIAL

## 2019-10-07 VITALS
DIASTOLIC BLOOD PRESSURE: 78 MMHG | HEIGHT: 62 IN | WEIGHT: 156 LBS | TEMPERATURE: 98.6 F | OXYGEN SATURATION: 97 % | SYSTOLIC BLOOD PRESSURE: 128 MMHG | HEART RATE: 88 BPM | BODY MASS INDEX: 28.71 KG/M2

## 2019-10-07 DIAGNOSIS — Z23 NEED FOR PROPHYLACTIC VACCINATION AND INOCULATION AGAINST INFLUENZA: ICD-10-CM

## 2019-10-07 DIAGNOSIS — B07.8 COMMON WART: Primary | ICD-10-CM

## 2019-10-07 PROCEDURE — 17110 DESTRUCTION B9 LES UP TO 14: CPT | Performed by: PHYSICIAN ASSISTANT

## 2019-10-07 PROCEDURE — 90686 IIV4 VACC NO PRSV 0.5 ML IM: CPT | Performed by: PHYSICIAN ASSISTANT

## 2019-10-07 PROCEDURE — 90471 IMMUNIZATION ADMIN: CPT | Performed by: PHYSICIAN ASSISTANT

## 2019-10-07 ASSESSMENT — MIFFLIN-ST. JEOR: SCORE: 1285.86

## 2019-10-07 NOTE — PROGRESS NOTES
Subjective     Leticia Aquino is a 49 year old female who presents to clinic today for the following health issues:    HPI   -Found a bump on her finger for about a month ago. Tender at times if she bumps it, but not otherwise. Presence of it bothers her. Wonders if it's a wart and would like to have it treated. No known injury. No txment to date.      Patient Active Problem List   Diagnosis     Iron deficiency anemia secondary to inadequate dietary iron intake     Premenstrual tension syndrome     CARDIOVASCULAR SCREENING; LDL GOAL LESS THAN 160     Pain in joint, ankle and foot     Hypothyroidism due to acquired atrophy of thyroid     Past Surgical History:   Procedure Laterality Date     C NONSPECIFIC PROCEDURE      Right Inguinal Hernia Repair     C NONSPECIFIC PROCEDURE      D&C for Spontaneous      GYN SURGERY  11    ablation     HYSTERECTOMY SUPRACERVICAL  2012       Social History     Tobacco Use     Smoking status: Former Smoker     Smokeless tobacco: Never Used     Tobacco comment: QUIT    Substance Use Topics     Alcohol use: Yes     Comment: 2-3 per week      Family History   Problem Relation Age of Onset     Diabetes Father         Insulin Dependent     Cardiovascular Father         Triple by-pass in [id number]     Thyroid Disease Mother         Hypothyroid     Hypertension Mother      Hyperlipidemia Mother      Cerebrovascular Disease Mother 70        TIA - former smoker     Cancer Maternal Grandfather      Thyroid Disease Daughter         Dx at age 10     Breast Cancer Half-Sister 45     Colon Cancer No family hx of      Osteoporosis No family hx of          Current Outpatient Medications   Medication Sig Dispense Refill     acyclovir (ZOVIRAX) 200 MG capsule Take 1 capsule (200 mg) by mouth 5 times daily 25 capsule 1     atorvastatin (LIPITOR) 40 MG tablet Take 1 tablet (40 mg) by mouth daily 90 tablet 1     FLUoxetine (PROZAC) 20 MG capsule Take 2 capsules (40 mg)  "by mouth daily 180 capsule 2     IBUPROFEN 600 MG OR TABS take 1 every 8 hrs if needed  60 prn     levothyroxine (SYNTHROID/LEVOTHROID) 100 MCG tablet Take 1 tablet (100 mcg) by mouth daily 90 tablet 2     Allergies   Allergen Reactions     Augmentin GI Disturbance     Codeine      gi     Pseudoephedrine Hcl      restlessness       Reviewed and updated as needed this visit by Provider  Tobacco  Allergies  Meds  Med Hx  Surg Hx  Fam Hx  Soc Hx        Review of Systems   ROS COMP: Constitutional, skin, MSK systems are negative, except as otherwise noted.        Objective    /78 (BP Location: Right arm, Cuff Size: Adult Regular)   Pulse 88   Temp 98.6  F (37  C) (Oral)   Ht 1.575 m (5' 2\")   Wt 70.8 kg (156 lb)   LMP 09/13/2012   SpO2 97%   BMI 28.53 kg/m    Body mass index is 28.53 kg/m .  Physical Exam   GENERAL: healthy, alert and no distress  SKIN: tiny 2mm verrucous flesh-colored papule located along dorsal aspect of R index finger near DIPJ.    Diagnostic Test Results:  none         Assessment & Plan       ICD-10-CM    1. Common wart B07.8 DESTRUCT BENIGN LESION, UP TO 14   2. Need for prophylactic vaccination and inoculation against influenza Z23 INFLUENZA VACCINE IM > 6 MONTHS VALENT IIV4 [81252]     Vaccine Administration, Initial [11138]   See Patient Instructions  Patient in agreement with plan.     Patient Instructions   Cryotherapy performed.  Risks reviewed.  Encouraged to follow-up anytime with symptoms infection.  Can retreat in 3 weeks if needed.       Return in about 1 month (around 11/7/2019) for Lab Work.    Janina Medina PA-C  [facility] [place]        "

## 2019-10-07 NOTE — PATIENT INSTRUCTIONS
Cryotherapy performed.  Risks reviewed.  Encouraged to follow-up anytime with symptoms infection.  Can retreat in 3 weeks if needed.

## 2019-11-13 DIAGNOSIS — E78.5 HYPERLIPIDEMIA LDL GOAL <130: ICD-10-CM

## 2019-11-13 LAB
CHOLEST SERPL-MCNC: 219 MG/DL
HDLC SERPL-MCNC: 75 MG/DL
LDLC SERPL CALC-MCNC: 124 MG/DL
NONHDLC SERPL-MCNC: 144 MG/DL
TRIGL SERPL-MCNC: 99 MG/DL

## 2019-11-13 PROCEDURE — 80061 LIPID PANEL: CPT | Performed by: PHYSICIAN ASSISTANT

## 2019-11-13 PROCEDURE — 36415 COLL VENOUS BLD VENIPUNCTURE: CPT | Performed by: PHYSICIAN ASSISTANT

## 2019-11-14 NOTE — RESULT ENCOUNTER NOTE
Dear Leticia,      Your recent test results are noted below:    -Cholesterol levels are now at your goal levels.  ADVISE: continuing your medication, a regular exercise program with at least 150 minutes of aerobic exercise per week, and eating a low saturated fat/low carbohydrate diet.  Also, you should recheck this fasting cholesterol panel in 12 months.    For additional lab test information, [email or web address] is an excellent reference. Please contact the clinic at [phone] with any further questions or concerns.    Sincerely,      Janina Medina PA-C  [facility]

## 2019-12-03 ENCOUNTER — OFFICE VISIT (OUTPATIENT)
Dept: FAMILY MEDICINE | Facility: CLINIC | Age: 49
End: 2019-12-03
Payer: COMMERCIAL

## 2019-12-03 VITALS
WEIGHT: 159 LBS | DIASTOLIC BLOOD PRESSURE: 78 MMHG | OXYGEN SATURATION: 99 % | SYSTOLIC BLOOD PRESSURE: 118 MMHG | TEMPERATURE: 97.8 F | HEIGHT: 62 IN | BODY MASS INDEX: 29.26 KG/M2 | HEART RATE: 73 BPM

## 2019-12-03 DIAGNOSIS — B07.8 COMMON WART: Primary | ICD-10-CM

## 2019-12-03 PROCEDURE — 17110 DESTRUCTION B9 LES UP TO 14: CPT | Performed by: PHYSICIAN ASSISTANT

## 2019-12-03 ASSESSMENT — MIFFLIN-ST. JEOR: SCORE: 1299.47

## 2019-12-03 NOTE — PROGRESS NOTES
Subjective     Leticia Aquino is a 49 year old female who presents to clinic today for the following health issues:    HPI   WART(S)  Onset: a while - 4 months    Description:   Location: index finger - right hand  Number of warts: 1  Painful: it did but it doesn't currently    Accompanying Signs & Symptoms:  Signs of infection: no    History:   History of trauma: no  Prior warts: no    Therapies Tried and outcome: 10/7 visit cryotherapy - not sure it really helped so would like to repeat.       Patient Active Problem List   Diagnosis     Iron deficiency anemia secondary to inadequate dietary iron intake     Premenstrual tension syndrome     CARDIOVASCULAR SCREENING; LDL GOAL LESS THAN 160     Pain in joint, ankle and foot     Hypothyroidism due to acquired atrophy of thyroid     Past Surgical History:   Procedure Laterality Date     C NONSPECIFIC PROCEDURE      Right Inguinal Hernia Repair     C NONSPECIFIC PROCEDURE      D&C for Spontaneous      GYN SURGERY  11    ablation     HYSTERECTOMY SUPRACERVICAL  2012       Social History     Tobacco Use     Smoking status: Former Smoker     Smokeless tobacco: Never Used     Tobacco comment: QUIT    Substance Use Topics     Alcohol use: Yes     Comment: 2-3 per week      Family History   Problem Relation Age of Onset     Diabetes Father         Insulin Dependent     Cardiovascular Father         Triple by-pass in [id number]     Thyroid Disease Mother         Hypothyroid     Hypertension Mother      Hyperlipidemia Mother      Cerebrovascular Disease Mother 70        TIA - former smoker     Cancer Maternal Grandfather      Thyroid Disease Daughter         Dx at age 10     Breast Cancer Half-Sister 45     Colon Cancer No family hx of      Osteoporosis No family hx of          Current Outpatient Medications   Medication Sig Dispense Refill     acyclovir (ZOVIRAX) 200 MG capsule Take 1 capsule (200 mg) by mouth 5 times daily 25 capsule 1      "atorvastatin (LIPITOR) 40 MG tablet Take 1 tablet (40 mg) by mouth daily 90 tablet 1     FLUoxetine (PROZAC) 20 MG capsule Take 2 capsules (40 mg) by mouth daily 180 capsule 2     IBUPROFEN 600 MG OR TABS take 1 every 8 hrs if needed  60 prn     levothyroxine (SYNTHROID/LEVOTHROID) 100 MCG tablet Take 1 tablet (100 mcg) by mouth daily 90 tablet 2     Allergies   Allergen Reactions     Augmentin GI Disturbance     Codeine      gi     Pseudoephedrine Hcl      restlessness     Reviewed and updated as needed this visit by Provider  Tobacco  Allergies  Meds  Med Hx  Surg Hx  Fam Hx  Soc Hx        Review of Systems   ROS COMP: Constitutional, SKIN, MSK systems are negative, except as otherwise noted.      Objective    /78 (BP Location: Right arm, Cuff Size: Adult Regular)   Pulse 73   Temp 97.8  F (36.6  C) (Oral)   Ht 1.575 m (5' 2\")   Wt 72.1 kg (159 lb)   LMP 09/13/2012   SpO2 99%   BMI 29.08 kg/m    Body mass index is 29.08 kg/m .  Physical Exam   GENERAL: healthy, alert and no distress  SKIN: tiny 2mm verrucous flesh-colored papule located along dorsal aspect of R index finger near DIPJ    Procedure: Informed consent obtained. Warts cleansed with alcohol. Several freeze-thaw cycles were then applied with liquid nitrogen. Pt tolerated well without complication.           Assessment & Plan       ICD-10-CM    1. Common wart B07.8 DESTRUCT BENIGN LESION, UP TO 14     DERMATOLOGY REFERRAL   See Patient Instructions  Patient in agreement with plan.     Patient Instructions   Cryotherapy performed.  Risks reviewed.  Encouraged to follow-up anytime with symptoms infection.  Can retreat in 3 weeks if needed again or refer to  skin clinic for further treatment/alternative options.      Return in about 3 weeks (around 12/24/2019) for [facility] skin clinic.    Janina Medina PA-C  [facility] [place]            "

## 2019-12-03 NOTE — PATIENT INSTRUCTIONS
Cryotherapy performed.  Risks reviewed.  Encouraged to follow-up anytime with symptoms infection.  Can retreat in 3 weeks if needed again or refer to EP skin clinic for further treatment/alternative options.

## 2020-02-06 ENCOUNTER — OFFICE VISIT (OUTPATIENT)
Dept: FAMILY MEDICINE | Facility: CLINIC | Age: 50
End: 2020-02-06
Payer: COMMERCIAL

## 2020-02-06 VITALS
OXYGEN SATURATION: 98 % | TEMPERATURE: 98.7 F | HEART RATE: 80 BPM | DIASTOLIC BLOOD PRESSURE: 86 MMHG | WEIGHT: 156 LBS | BODY MASS INDEX: 28.71 KG/M2 | SYSTOLIC BLOOD PRESSURE: 114 MMHG | HEIGHT: 62 IN

## 2020-02-06 DIAGNOSIS — N63.0 LUMP OR MASS IN BREAST: Primary | ICD-10-CM

## 2020-02-06 PROCEDURE — 99213 OFFICE O/P EST LOW 20 MIN: CPT | Performed by: NURSE PRACTITIONER

## 2020-02-06 ASSESSMENT — MIFFLIN-ST. JEOR: SCORE: 1285.86

## 2020-02-10 ENCOUNTER — HOSPITAL ENCOUNTER (OUTPATIENT)
Dept: MAMMOGRAPHY | Facility: CLINIC | Age: 50
End: 2020-02-10
Attending: NURSE PRACTITIONER
Payer: COMMERCIAL

## 2020-02-10 ENCOUNTER — HOSPITAL ENCOUNTER (OUTPATIENT)
Dept: MAMMOGRAPHY | Facility: CLINIC | Age: 50
Discharge: HOME | End: 2020-02-10
Attending: NURSE PRACTITIONER | Admitting: NURSE PRACTITIONER
Payer: COMMERCIAL

## 2020-02-10 DIAGNOSIS — N63.0 LUMP OR MASS IN BREAST: ICD-10-CM

## 2020-02-10 PROCEDURE — G0279 TOMOSYNTHESIS, MAMMO: HCPCS

## 2020-02-10 PROCEDURE — 76642 ULTRASOUND BREAST LIMITED: CPT | Mod: LT

## 2020-02-10 NOTE — LETTER
Leticia Aquino  [address] [place] [address]    February 10, 2020  Date of Exam:     Dear Leticia Aquino:    Thank you for your recent visit.  Breast Imaging Result: We are pleased to inform you that the results of your recent breast imaging show no evidence of malignancy (cancer).    Breast Density: Your mammogram shows that you have dense breast tissue. This means you have a slightly higher risk of getting breast cancer. It also means your mammograms will be harder to read but it doesn't mean that mammograms aren t useful. In fact, yearly mammograms are even more important for women at higher risk.    If you are experiencing any breast problems such as a lump or localized pain we request that you discuss this with your health care provider if you haven t already done so, as additional testing may be necessary.    As you know, early detection of cancer is very important. Although mammography is the most accurate method for early detection, not all cancers are found through mammography. A thorough examination includes a combination of mammography, physical examination and breast self-examination. Currently the American College of Radiology and the Society of Breast Imaging recommend an annual mammogram for all women beginning at the age of 40.    A report of your breast imaging results was sent to: Cait Roberts    Your breast imaging will become part of your medical file here at [facility] for at least 10 years. You are responsible for informing any new health care provider or breast imaging facility of the date and location of this examination.    We appreciate the opportunity to participate in your health care.    Sincerely,    Desirae Hammond MD  Interpreting Radiologist  [facility]

## 2020-02-11 NOTE — PROGRESS NOTES
[facility] - PRIMARY CARE SKIN    CC: wart(s)  SUBJECTIVE:   Leticia Aquino is a(n) 49 year old female who presents to clinic today because of a wart on the right index finger that started approximately 4 months ago.    Treatments tried: Liquid nitrogen cryotherapy x2.    Last treatment date: 12/3/2019 by her primary care provider.  Type of treatment: cryotherapy.  Treatment number: 2.  Treatment response: No change. The lesion is still present though it had developed a blood blister, become black, then scabbed over with each previous treatment.    Family Medical History  Skin Cancer: NO  Eczema Psoriasis Lupus   NO NO NO     Occupation: computer work (indoor).    Refer to electronic medical record (EMR) for past medical history and medications.    INTEGUMENTARY/SKIN: POSITIVE for non-healing lesion  ROS: 14 point review of systems was negative except the symptoms listed above in the HPI.    This document serves as a record of the services and decisions personally performed and made by Shabana Blue MD and was created by Julio Jensen, a trained medical scribe, based on personal observations and provider statements to the medical scribe.  February 17, 2020 12:12 PM   Julio Jensen    OBJECTIVE:   GENERAL: healthy, alert and no distress.  HEENT: PERRL. Conjunctiva, sclera clear.  SKIN: Thao Skin Type - II-III.  Fingers examined. The dermatoscope was used to help evaluate pigmented lesions.  Skin Pertinent Findings:  Right index finger, over DIP joint: 2 mm in size smooth raised lesion with erythematous center. ? Wart ? Cyst ? Other    ASSESSMENT & PLAN:     Encounter Diagnosis   Name Primary?     Neoplasm of uncertain behavior of skin Yes           Patient Instructions   FUTURE APPOINTMENTS  Follow up per pathology report. You will be notified, generally via letter or MyChart, in approximately 10 days. If there is anything we need to discuss or further treatment needed, I will call you to discuss  "it.    WOUND CARE INSTRUCTIONS  1. Wash hands before every dressing change.  2. Change the dressing after 24 hours and once daily, or earlier if it becomes saturated.  3. Wash the wound area with a mild soap, then rinse.  4. Gently pat dry with a sterile gauze or Q-tip.  5. Using a Q-tip, apply Vaseline or Aquaphor only over entire wound. DO NOT use Neosporin - as many people react to neomycin.  6. Finally, cover with a bandage or sterile non-stick gauze with micropore paper tape.  7. Repeat once daily until wound has healed.      Soap, water and shampoo will not hurt this area.    Do not go swimming or take baths, but showering is encouraged.    Limit use of the area where the procedure was done for a few days to allow for optimal healing.    Signs of Infection:  Infection can occur in any area where skin has been disrupted. If you notice persistent redness, swelling, colored drainage, increasing pain, fever or other signs of infection, please call us at: [phone] and ask to have me or my colleague paged. We will call you back to discuss.    If you experience bleeding:  Wash hands and hold firm pressure on the area for 10 minutes without checking to see if the bleeding has stopped. \"Checking\" pulls off the protective wound clot and restarts the bleeding all over again. Re-apply pressure for 10 minutes if necessary to stop bleeding.  Use additional sterile gauze and tape to maintain pressure once bleeding has stopped.  If bleeding continues, then call back to clinic at [phone].    PATIENT INFORMATION : WOUNDS  During the healing process you will notice a number of changes.     All wounds normally drain.  The larger the wound the more drainage there will be.  After 7-10 days, you will notice the wound beginning to shrink and new skin will begin to grow.  The wound is healed when you can see that skin has formed over the entire area.  A healed wound has a healthy, shiny look to the surface and is red to " dark pink in color to normalize.  Wounds may take approximately 4-6 weeks to heal.  Larger wounds may take 6-8 weeks. After the wound is healed you may discontinue dressing changes.    All wounds develop a small halo of redness surrounding the wound which means that healing is occurring. Severe itching with extensive redness usually indicates sensitivity to the ointment or bandage tape used to dress the wound.  You should call our office if severe itching with extensive redness develops.    Swelling  and/or discoloration around your surgical site is common, particularly when performed around the eye.  You may experience a sensation of tightness as your wound heals. This is normal and will gradually subside.  Your healed wound may be sensitive to temperature changes. This sensitivity improves with time, but if you re having a lot of discomfort, try to avoid temperature extremes.  Patients frequently experience itching after their wound appears to have healed because of the continue healing under the skin.  Plain Vaseline will help relieve the itching.          TT: 20 minutes.  CT: 15 minutes, discussing treatment options (including insurance considerations), side effects, risks and benefits of the treatment options, management of pain and blister formation and when to return if not improving.    The information in this document, created by the medical scribe for me, accurately reflects the services I personally performed and the decisions made by me. I have reviewed and approved this document for accuracy prior to leaving the patient care area.  February 17, 2020 12:12 PM  Shabana Blue MD  [facility]

## 2020-02-11 NOTE — RESULT ENCOUNTER NOTE
Dear Leticia,     I am happy to see that your mammogram and breast ultrasound was negative and reassuring.      Please send a iAdvize message or call [phone]  if you have any questions.      CATHLEEN Schwab, CNP  [facility]    If you have further questions about the interpretation of your labs, [email or web address] is a good website to check out for further information.

## 2020-02-14 NOTE — PROGRESS NOTES
Dear Leticia,    Your recent mammogram was normal. Annual mammograms are recommended, so you will be due again in March 2018.  You may receive a separate result letter in the mail from the imaging center.    Please contact the clinic if you have additional questions.  Thank you.    Sincerely,    Mariajose Gomez PA-C  Physician extender for Dr. Karen Weiler   0 = swallows foods/liquids without difficulty

## 2020-02-17 ENCOUNTER — OFFICE VISIT (OUTPATIENT)
Dept: FAMILY MEDICINE | Facility: CLINIC | Age: 50
End: 2020-02-17
Payer: COMMERCIAL

## 2020-02-17 VITALS — SYSTOLIC BLOOD PRESSURE: 126 MMHG | DIASTOLIC BLOOD PRESSURE: 72 MMHG

## 2020-02-17 DIAGNOSIS — D48.5 NEOPLASM OF UNCERTAIN BEHAVIOR OF SKIN: Primary | ICD-10-CM

## 2020-02-17 PROCEDURE — 88305 TISSUE EXAM BY PATHOLOGIST: CPT | Mod: TC | Performed by: FAMILY MEDICINE

## 2020-02-17 PROCEDURE — 11305 SHAVE SKIN LESION 0.5 CM/<: CPT | Performed by: FAMILY MEDICINE

## 2020-02-17 PROCEDURE — 99213 OFFICE O/P EST LOW 20 MIN: CPT | Mod: 25 | Performed by: FAMILY MEDICINE

## 2020-02-17 NOTE — LETTER
2/17/2020         RE: Leticia Aquino  [address] [place] [address]        Dear Colleague,    Thank you for [place] your [place] [facility] [place] Please see a copy of my visit note below.    [facility] - PRIMARY CARE SKIN    CC: wart(s)  SUBJECTIVE:   Leticia Aquino is a(n) 49 year old female who presents to clinic today because of a wart on the right index finger that started approximately 4 months ago.    Treatments tried: Liquid nitrogen cryotherapy x2.    Last treatment date: 12/3/2019 by her primary care provider.  Type of treatment: cryotherapy.  Treatment number: 2.  Treatment response: No change. The lesion is still present though it had developed a blood blister, become black, then scabbed over with each previous treatment.    Family Medical History  Skin Cancer: NO  Eczema Psoriasis Lupus   NO NO NO     Occupation: computer work (indoor).    Refer to electronic medical record (EMR) for past medical history and medications.    INTEGUMENTARY/SKIN: POSITIVE for non-healing lesion  ROS: 14 point review of systems was negative except the symptoms listed above in the HPI.    This document serves as a record of the services and decisions personally performed and made by Shabana Blue MD and was created by Julio Jensen, a trained medical scribe, based on personal observations and provider statements to the medical scribe.  February 17, 2020 12:12 PM   Julio Jensen    OBJECTIVE:   GENERAL: healthy, alert and no distress.  HEENT: PERRL. Conjunctiva, sclera clear.  SKIN: Thao Skin Type - II-III.  Fingers examined. The dermatoscope was used to help evaluate pigmented lesions.  Skin Pertinent Findings:  Right index finger, over DIP joint: 2 mm in size smooth raised lesion with erythematous center. ? Wart ? Cyst ? Other    ASSESSMENT & PLAN:     Encounter Diagnosis   Name Primary?     Neoplasm of uncertain behavior of skin Yes  "          Patient Instructions   FUTURE APPOINTMENTS  Follow up per pathology report. You will be notified, generally via letter or MyChart, in approximately 10 days. If there is anything we need to discuss or further treatment needed, I will call you to discuss it.    WOUND CARE INSTRUCTIONS  1. Wash hands before every dressing change.  2. Change the dressing after 24 hours and once daily, or earlier if it becomes saturated.  3. Wash the wound area with a mild soap, then rinse.  4. Gently pat dry with a sterile gauze or Q-tip.  5. Using a Q-tip, apply Vaseline or Aquaphor only over entire wound. DO NOT use Neosporin - as many people react to neomycin.  6. Finally, cover with a bandage or sterile non-stick gauze with micropore paper tape.  7. Repeat once daily until wound has healed.      Soap, water and shampoo will not hurt this area.    Do not go swimming or take baths, but showering is encouraged.    Limit use of the area where the procedure was done for a few days to allow for optimal healing.    Signs of Infection:  Infection can occur in any area where skin has been disrupted. If you notice persistent redness, swelling, colored drainage, increasing pain, fever or other signs of infection, please call us at: [phone] and ask to have me or my colleague paged. We will call you back to discuss.    If you experience bleeding:  Wash hands and hold firm pressure on the area for 10 minutes without checking to see if the bleeding has stopped. \"Checking\" pulls off the protective wound clot and restarts the bleeding all over again. Re-apply pressure for 10 minutes if necessary to stop bleeding.  Use additional sterile gauze and tape to maintain pressure once bleeding has stopped.  If bleeding continues, then call back to clinic at [phone].    PATIENT INFORMATION : WOUNDS  During the healing process you will notice a number of changes.     All wounds normally drain.  The larger the wound the more drainage " there will be.  After 7-10 days, you will notice the wound beginning to shrink and new skin will begin to grow.  The wound is healed when you can see that skin has formed over the entire area.  A healed wound has a healthy, shiny look to the surface and is red to dark pink in color to normalize.  Wounds may take approximately 4-6 weeks to heal.  Larger wounds may take 6-8 weeks. After the wound is healed you may discontinue dressing changes.    All wounds develop a small halo of redness surrounding the wound which means that healing is occurring. Severe itching with extensive redness usually indicates sensitivity to the ointment or bandage tape used to dress the wound.  You should call our office if severe itching with extensive redness develops.    Swelling  and/or discoloration around your surgical site is common, particularly when performed around the eye.  You may experience a sensation of tightness as your wound heals. This is normal and will gradually subside.  Your healed wound may be sensitive to temperature changes. This sensitivity improves with time, but if you re having a lot of discomfort, try to avoid temperature extremes.  Patients frequently experience itching after their wound appears to have healed because of the continue healing under the skin.  Plain Vaseline will help relieve the itching.          TT: 20 minutes.  CT: 15 minutes, discussing treatment options (including insurance considerations), side effects, risks and benefits of the treatment options, management of pain and blister formation and when to return if not improving.    The information in this document, created by the medical scribe for me, accurately reflects the services I personally performed and the decisions made by me. I have reviewed and approved this document for accuracy prior to leaving the patient care area.  February 17, 2020 12:12 PM  Shabana Blue MD  [facility] [place]    Again, thank you for allowing  me to participate in the care of your patient.        Sincerely,        Shabana Blue MD

## 2020-02-17 NOTE — PROCEDURES
Name : Shave Excision  Indication : Excision of tissue for pathology evaluation.  Location(s) : Right index finger, over DIP joint: 2 mm in size smooth raised lesion with erythematous center. ? Wart ? Cyst ? Other.  Completed by : Saba Blue MD  Photo Taken : yes.  Anesthesia : Patient was anesthetized by infiltrating the area surrounding the lesion with 1% lidocaine.   epinephrine [id number] : Yes.  Note : Discussed the risk of pain, infection, scarring, hypo- or hyperpigmentation and recurrence or need for re-treatment. The benefits of treatment and alternative treatments were also discussed.    During this procedure, the universal protocol was utilized. The patient's identity was confirmed by no less than two patient identifiers, correct procedure was verified, correct site was verified and marked as applicable and a final pause was completed.    Sterile technique was used throughout the procedure. The skin was cleaned and prepped with surgical cleanser. Once adequate anesthesia was obtained, the lesion was removed with a deep scallop shave procedure. The specimen was sent to pathology.    Direct pressure and aluminum chloride and monopolar cautery was applied for hemostasis. No bleeding was present upon the completion of the procedure. The wound was coated with antibacterial ointment. A dry sterile dressing was applied. Patient tolerated the procedure well and left in satisfactory condition.    Primary provider and referring provider will be informed regarding the tissue report when it returns.

## 2020-02-17 NOTE — PATIENT INSTRUCTIONS
"FUTURE APPOINTMENTS  Follow up per pathology report. You will be notified, generally via letter or MyChart, in approximately 10 days. If there is anything we need to discuss or further treatment needed, I will call you to discuss it.    WOUND CARE INSTRUCTIONS  1. Wash hands before every dressing change.  2. Change the dressing after 24 hours and once daily, or earlier if it becomes saturated.  3. Wash the wound area with a mild soap, then rinse.  4. Gently pat dry with a sterile gauze or Q-tip.  5. Using a Q-tip, apply Vaseline or Aquaphor only over entire wound. DO NOT use Neosporin - as many people react to neomycin.  6. Finally, cover with a bandage or sterile non-stick gauze with micropore paper tape.  7. Repeat once daily until wound has healed.      Soap, water and shampoo will not hurt this area.    Do not go swimming or take baths, but showering is encouraged.    Limit use of the area where the procedure was done for a few days to allow for optimal healing.    Signs of Infection:  Infection can occur in any area where skin has been disrupted. If you notice persistent redness, swelling, colored drainage, increasing pain, fever or other signs of infection, please call us at: [phone] and ask to have me or my colleague paged. We will call you back to discuss.    If you experience bleeding:  Wash hands and hold firm pressure on the area for 10 minutes without checking to see if the bleeding has stopped. \"Checking\" pulls off the protective wound clot and restarts the bleeding all over again. Re-apply pressure for 10 minutes if necessary to stop bleeding.  Use additional sterile gauze and tape to maintain pressure once bleeding has stopped.  If bleeding continues, then call back to clinic at [phone].    PATIENT INFORMATION : WOUNDS  During the healing process you will notice a number of changes.     All wounds normally drain.  The larger the wound the more drainage there will be.  After 7-10 days, " you will notice the wound beginning to shrink and new skin will begin to grow.  The wound is healed when you can see that skin has formed over the entire area.  A healed wound has a healthy, shiny look to the surface and is red to dark pink in color to normalize.  Wounds may take approximately 4-6 weeks to heal.  Larger wounds may take 6-8 weeks. After the wound is healed you may discontinue dressing changes.    All wounds develop a small halo of redness surrounding the wound which means that healing is occurring. Severe itching with extensive redness usually indicates sensitivity to the ointment or bandage tape used to dress the wound.  You should call our office if severe itching with extensive redness develops.    Swelling  and/or discoloration around your surgical site is common, particularly when performed around the eye.  You may experience a sensation of tightness as your wound heals. This is normal and will gradually subside.  Your healed wound may be sensitive to temperature changes. This sensitivity improves with time, but if you re having a lot of discomfort, try to avoid temperature extremes.  Patients frequently experience itching after their wound appears to have healed because of the continue healing under the skin.  Plain Vaseline will help relieve the itching.

## 2020-02-19 DIAGNOSIS — E78.5 HYPERLIPIDEMIA LDL GOAL <130: ICD-10-CM

## 2020-02-19 LAB — COPATH REPORT: NORMAL

## 2020-02-19 RX ORDER — ATORVASTATIN CALCIUM 40 MG/1
40 TABLET, FILM COATED ORAL DAILY
Qty: 90 TABLET | Refills: 2 | Status: SHIPPED | OUTPATIENT
Start: 2020-02-19 | End: 2020-11-03

## 2020-02-19 NOTE — TELEPHONE ENCOUNTER
"Requested Prescriptions   Pending Prescriptions Disp Refills     atorvastatin 40 MG PO tablet  Last Written Prescription Date:  8/21/2019  Last Fill Quantity: 90 tablet,  # refills: 1   Last office visit: 2/6/2020 with prescribing provider:  Armando     Future Office Visit:       90 tablet 1     Sig: Take 1 tablet (40 mg) by mouth daily       Statins Protocol Passed - 2/19/2020  8:48 AM        Passed - LDL on file in past 12 months     Recent Labs   Lab Test 11/13/19  0839   *             Passed - No abnormal creatine kinase in past 12 months     No lab results found.             Passed - Recent (12 mo) or future (30 days) visit within the authorizing provider's specialty     Patient has had an office visit with the authorizing provider or a provider within the authorizing providers department within the previous 12 mos or has a future within next 30 days. See \"Patient Info\" tab in inbasket, or \"Choose Columns\" in Meds & Orders section of the refill encounter.              Passed - Medication is active on med list        Passed - Patient is age 18 or older        Passed - No active pregnancy on record        Passed - No positive pregnancy test in past 12 months        "

## 2020-02-19 NOTE — TELEPHONE ENCOUNTER
Result note from labs reviewed from 11/13/19 patient to continue same dose and repeat labs in 12 mos Will do refills to around time of next lab needed Nov 2020. Julia Morrow R.N.

## 2020-02-20 ENCOUNTER — TELEPHONE (OUTPATIENT)
Dept: FAMILY MEDICINE | Facility: CLINIC | Age: 50
End: 2020-02-20

## 2020-02-20 NOTE — TELEPHONE ENCOUNTER
Left message on answering machine for patient to call back.    Nelda SIDHURN BSN  [facility]  [phone]

## 2020-02-20 NOTE — TELEPHONE ENCOUNTER
----- Message from Shabana Blue MD sent at 2/20/2020  9:46 AM CST -----  Please call :     Consistent with cyst, so this may recur , as I explained to her at the office visit.     Thank you,   Shabana Blue M.D.

## 2020-02-20 NOTE — TELEPHONE ENCOUNTER
Patient notified of test results and providers message, patient has no further questions.    Nelda SIDHURN BSN  [facility]  [phone]

## 2020-04-09 DIAGNOSIS — N94.3 PREMENSTRUAL TENSION SYNDROME: ICD-10-CM

## 2020-04-09 DIAGNOSIS — E03.4 HYPOTHYROIDISM DUE TO ACQUIRED ATROPHY OF THYROID: ICD-10-CM

## 2020-04-09 RX ORDER — LEVOTHYROXINE SODIUM 100 UG/1
100 TABLET ORAL DAILY
Qty: 90 TABLET | Refills: 2 | Status: CANCELLED | OUTPATIENT
Start: 2020-04-09

## 2020-04-09 NOTE — TELEPHONE ENCOUNTER
"levothyroxine (SYNTHROID/LEVOTHROID) 100 MCG tablet   Last Written Prescription Date:  7/24/2019  Last Fill Quantity: 90,  # refills: 2   Last office visit: 2/17/2020 with prescribing provider:  Shbaana Blue MD   Future Office Visit:  NA    FLUoxetine (PROZAC) 20 MG capsule   Last Written Prescription Date:  7/24/2019  Last Fill Quantity: 180,  # refills: 2   Last office visit: 2/17/2020 with prescribing provider:  Shabana Blue MD   Future Office Visit:  NA      Requested Prescriptions   Pending Prescriptions Disp Refills     levothyroxine (SYNTHROID/LEVOTHROID) 100 MCG tablet 90 tablet 2     Sig: Take 1 tablet (100 mcg) by mouth daily       Thyroid Protocol Failed - 4/9/2020  3:11 PM        Failed - Normal TSH on file in past 12 months     Recent Labs   Lab Test 03/14/19  0810   TSH 1.87              Passed - Patient is 12 years or older        Passed - Recent (12 mo) or future (30 days) visit within the authorizing provider's specialty     Patient has had an office visit with the authorizing provider or a provider within the authorizing providers department within the previous 12 mos or has a future within next 30 days. See \"Patient Info\" tab in inbasket, or \"Choose Columns\" in Meds & Orders section of the refill encounter.              Passed - Medication is active on med list        Passed - No active pregnancy on record     If patient is pregnant or has had a positive pregnancy test, please check TSH.          Passed - No positive pregnancy test in past 12 months     If patient is pregnant or has had a positive pregnancy test, please check TSH.             FLUoxetine (PROZAC) 20 MG capsule 180 capsule 2     Sig: Take 2 capsules (40 mg) by mouth daily       SSRIs Protocol Passed - 4/9/2020  3:11 PM        Passed - Recent (12 mo) or future (30 days) visit within the authorizing provider's specialty     Patient has had an office visit with the authorizing provider or a provider within " "the authorizing providers department within the previous 12 mos or has a future within next 30 days. See \"Patient Info\" tab in inbasket, or \"Choose Columns\" in Meds & Orders section of the refill encounter.              Passed - Medication is active on med list        Passed - Patient is age 18 or older        Passed - No active pregnancy on record        Passed - No positive pregnancy test in last 12 months             "

## 2020-04-10 DIAGNOSIS — N94.3 PREMENSTRUAL TENSION SYNDROME: ICD-10-CM

## 2020-04-10 DIAGNOSIS — E03.4 HYPOTHYROIDISM DUE TO ACQUIRED ATROPHY OF THYROID: ICD-10-CM

## 2020-04-10 NOTE — TELEPHONE ENCOUNTER
"Requested Prescriptions   Pending Prescriptions Disp Refills     FLUoxetine (PROZAC) 20 MG capsule          Last Written Prescription Date:  7.24.19  Last Fill Quantity: 180 capsule,  # refills: 2   Last office visit: 2/17/2020 with prescribing provider:  pamela Medina         Future Office Visit:       180 capsule 2     Sig: Take 2 capsules (40 mg) by mouth daily       SSRIs Protocol Passed - 4/10/2020  1:27 PM         PHQ-9 SCORE 6/22/2015 6/5/2018 3/15/2019   PHQ-9 Total Score 4 - -   PHQ-9 Total Score - 1 1     RENETTA-7 SCORE 6/22/2015 6/5/2018 3/15/2019   Total Score 0 - -   Total Score - 0 2              Passed - Recent (12 mo) or future (30 days) visit within the authorizing provider's specialty     Patient has had an office visit with the authorizing provider or a provider within the authorizing providers department within the previous 12 mos or has a future within next 30 days. See \"Patient Info\" tab in inbasket, or \"Choose Columns\" in Meds & Orders section of the refill encounter.              Passed - Medication is active on med list        Passed - Patient is age 18 or older        Passed - No active pregnancy on record        Passed - No positive pregnancy test in last 12 months           levothyroxine (SYNTHROID/LEVOTHROID) 100 MCG tablet            Last Written Prescription Date:  7.24.19  Last Fill Quantity: 90 tablet,  # refills: 2   Last office visit: 2/17/2020 with prescribing provider:  Codie Guerra         Future Office Visit:       90 tablet 2     Sig: Take 1 tablet (100 mcg) by mouth daily       Thyroid Protocol Failed - 4/10/2020  1:27 PM        Failed - Normal TSH on file in past 12 months     Recent Labs   Lab Test 03/14/19  0810   TSH 1.87              Passed - Patient is 12 years or older        Passed - Recent (12 mo) or future (30 days) visit within the authorizing provider's specialty     Patient has had an office visit with the authorizing provider or a provider within the authorizing " "providers department within the previous 12 mos or has a future within next 30 days. See \"Patient Info\" tab in inbasket, or \"Choose Columns\" in Meds & Orders section of the refill encounter.              Passed - Medication is active on med list        Passed - No active pregnancy on record     If patient is pregnant or has had a positive pregnancy test, please check TSH.          Passed - No positive pregnancy test in past 12 months     If patient is pregnant or has had a positive pregnancy test, please check TSH.             "

## 2020-05-05 RX ORDER — LEVOTHYROXINE SODIUM 100 UG/1
100 TABLET ORAL DAILY
Qty: 90 TABLET | Refills: 0 | Status: SHIPPED | OUTPATIENT
Start: 2020-05-05 | End: 2020-07-24

## 2020-05-05 NOTE — TELEPHONE ENCOUNTER
Leticia is calling to check on status of Fluoxetine refill. Her mail order pharmacy doesn't have record of new refill Rx from 4/14/20.    There isn't an electronic e-verification that they received it. The computer shows that it was a local print.?    I Changed pharmacy to [facility] in [facility] instead of [place] which is by fax only.    Leticia is down to just a few pills left and needs the Rx to get to [facility] Mail order ASAP.    Arleth Watt  Patient Representative

## 2020-05-05 NOTE — TELEPHONE ENCOUNTER
Abdominal Pain, N/V/D Routing refill request to provider for review/approval because:  Labs not current:  TSH    PAULINE MenendezN, RN  [employer] [facility]

## 2020-05-06 NOTE — TELEPHONE ENCOUNTER
Due for TSH check. Please schedule lab-only appointment. Refill of Synthroid sent to preferred pharmacy.    Liam Wood DO  5/5/2020 11:08 PM

## 2020-05-08 DIAGNOSIS — E03.4 HYPOTHYROIDISM DUE TO ACQUIRED ATROPHY OF THYROID: ICD-10-CM

## 2020-05-08 DIAGNOSIS — N94.3 PREMENSTRUAL TENSION SYNDROME: ICD-10-CM

## 2020-05-08 LAB — TSH SERPL DL<=0.005 MIU/L-ACNC: 2.48 MU/L (ref 0.4–4)

## 2020-05-08 PROCEDURE — 84443 ASSAY THYROID STIM HORMONE: CPT | Performed by: FAMILY MEDICINE

## 2020-05-08 PROCEDURE — 36415 COLL VENOUS BLD VENIPUNCTURE: CPT | Performed by: FAMILY MEDICINE

## 2020-05-08 NOTE — TELEPHONE ENCOUNTER
Reason for Call:  Other prescription    Detailed comments: Pt called this afternoon and would like a 90 day supply of her fluoxetine. Please give pt a call if there are any questions. Thank you.    Phone Number Patient can be reached at: Home number on file [phone] (home)    Best Time:     Can we leave a detailed message on this number? YES    Call taken on 5/8/2020 at 12:08 PM by Eliana Nuno

## 2020-05-08 NOTE — TELEPHONE ENCOUNTER
Reviewed with [facility] pharmacist. This was ordered by 4/14/20 by Pharm D, but was local print and perhaps was not faxed.     FLUoxetine (PROZAC) 20 MG capsule  180 capsule  2  4/14/2020   No    Sig - Route: Take 2 capsules (40 mg) by mouth daily - Oral    Class: Local Print    Order: [id number]        Will resend order electronically. Julia Morrow R.N.

## 2020-05-11 NOTE — TELEPHONE ENCOUNTER
This was sent to incorrect pharmacy - please send to [facility] Mail order      Halle Alejandro

## 2020-05-18 ENCOUNTER — TELEPHONE (OUTPATIENT)
Dept: FAMILY MEDICINE | Facility: CLINIC | Age: 50
End: 2020-05-18

## 2020-05-18 NOTE — TELEPHONE ENCOUNTER
Reason for call:  Patient reporting a symptom    Symptom or request: Follow up wart    Duration (how long have symptoms been present):     Have you been treated for this before? Yes    Additional comments: Pt was seen in February and it is not better    Phone Number patient can be reached at:  Home number on file [phone] (home)    Best Time:      Can we leave a detailed message on this number:  YES    Call taken on 5/18/2020 at 2:18 PM by Coral Alejandra

## 2020-05-19 NOTE — TELEPHONE ENCOUNTER
Left detailed voice mail that we are scheduling wart rechecks in July.    Thank you,    Nelda LARARN BSN  [facility]  [phone]

## 2020-06-09 ENCOUNTER — OFFICE VISIT (OUTPATIENT)
Dept: FAMILY MEDICINE | Facility: CLINIC | Age: 50
End: 2020-06-09
Payer: COMMERCIAL

## 2020-06-09 VITALS — SYSTOLIC BLOOD PRESSURE: 134 MMHG | DIASTOLIC BLOOD PRESSURE: 86 MMHG

## 2020-06-09 DIAGNOSIS — M71.30 MYXOID CYST: Primary | ICD-10-CM

## 2020-06-09 PROCEDURE — 99213 OFFICE O/P EST LOW 20 MIN: CPT | Performed by: FAMILY MEDICINE

## 2020-06-09 NOTE — LETTER
6/9/2020         RE: Leticia Aquino  [address] [place] [address]        Dear Colleague,    Thank you for [place] your patient, [place]icia Aquino, [place] [facility] [place] Please see a copy of my visit note below.    [facility] - PRIMARY CARE SKIN    CC: wart(s)  SUBJECTIVE:   Leticia Aquino is a(n) 49 year old female who presents to clinic today because of recurrent cyst on the right index finger.     Tissue biopsy :         Skin, right index finger, shave:   - Features consistent with digital myxoid cyst - (see description)          Family Medical History  Skin Cancer: NO  Eczema Psoriasis Lupus   NO NO NO     Occupation: computer work (indoor).    Refer to electronic medical record (EMR) for past medical history and medications.    INTEGUMENTARY/SKIN: POSITIVE for non-healing lesion  ROS: 14 point review of systems was negative except the symptoms listed above in the HPI.        OBJECTIVE:   GENERAL: healthy, alert and no distress.  HEENT: PERRL. Conjunctiva, sclera clear.  SKIN: Thao Skin Type - II-III.  Fingers examined. The dermatoscope was used to help evaluate pigmented lesions.  Skin Pertinent Findings:  Right index finger, over DIP joint: bump over the right index finger with overlying 4 mm semi translucent covering    ASSESSMENT & PLAN:     Encounter Diagnosis   Name Primary?     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        Sincerely,        Shabana Blue MD

## 2020-06-09 NOTE — PATIENT INSTRUCTIONS
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

## 2020-06-09 NOTE — PROGRESS NOTES
[facility] - PRIMARY CARE SKIN    CC: wart(s)  SUBJECTIVE:   Leticia Aquino is a(n) 49 year old female who presents to clinic today because of recurrent cyst on the right index finger.     Tissue biopsy :         Skin, right index finger, shave:   - Features consistent with digital myxoid cyst - (see description)          Family Medical History  Skin Cancer: NO  Eczema Psoriasis Lupus   NO NO NO     Occupation: computer work (indoor).    Refer to electronic medical record (EMR) for past medical history and medications.    INTEGUMENTARY/SKIN: POSITIVE for non-healing lesion  ROS: 14 point review of systems was negative except the symptoms listed above in the HPI.        OBJECTIVE:   GENERAL: healthy, alert and no distress.  HEENT: PERRL. Conjunctiva, sclera clear.  SKIN: Thao Skin Type - II-III.  Fingers examined. The dermatoscope was used to help evaluate pigmented lesions.  Skin Pertinent Findings:  Right index finger, over DIP joint: bump over the right index finger with overlying 4 mm semi translucent covering    ASSESSMENT & PLAN:     Encounter Diagnosis   Name Primary?     Myxoid cyst Yes           Patient Instructions   [facility], hand surgeon = for recurrent myxoid cyst on the right index finger       [phone]

## 2020-09-28 NOTE — PROGRESS NOTES
Subjective     Leticia Aquino is a 50 year old female who presents to clinic today for the following health issues:    HPI       {SUPERLIST (Optional):778253}  {additonal problems for provider to add (Optional):113217}    Review of Systems   {ROS COMP (Optional):984351}      Objective    LMP 09/13/2012   There is no height or weight on file to calculate BMI.  Physical Exam   {Exam List (Optional):528997}    {Diagnostic Test Results (Optional):552928}        {PROVIDER CHARTING PREFERENCE:415671}

## 2020-09-28 NOTE — PROGRESS NOTES
"Subjective     Leticia Aquino is a 50 year old female who presents to clinic today for the following health issues:    History of Present Illness        She eats 2-3 servings of fruits and vegetables daily.She consumes 0 sweetened beverage(s) daily.She exercises with enough effort to increase her heart rate 10 to 19 minutes per day.  She exercises with enough effort to increase her heart rate 3 or less days per week.   She is taking medications regularly.     Musculoskeletal problem/pain    Onset/Duration: 4 months   Description  Location: right upper arm  Will get a deep intense itch, intermittent - can happen in the middle of the night, in the sun or daylight.  Isolated to one area.   No rash.  Alleviating with rubbing or holding area.  Can last up to an hour or more.    Joint Swelling: no  Redness: no  Pain: YES - it can be and will end up rubbing it    Warmth: no  Intensity:  mild  Progression of Symptoms:  same  Accompanying signs and symptoms:   Fevers: no  Numbness/tingling/weakness: no  History  Trauma to the area: no  Recent illness:  no  Previous similar problem: no  Previous evaluation:  no  Precipitating or alleviating factors:  Aggravating factors include: none  Therapies tried and outcome: nothing    Bilateral knees cracking more.  Intermittent pain in bilateral knees going up and down stairs.   Previously was working out more - walking and doing cardio.     Social:  Works as a title company.    Has a cabin and has enjoyed spending time there.      Review of Systems   Constitutional, HEENT, cardiovascular, pulmonary, gi and gu systems are negative, except as otherwise noted.      Objective    /68   Pulse 86   Temp 98.4  F (36.9  C) (Oral)   Ht 1.575 m (5' 2\")   Wt 74.4 kg (164 lb 1.6 oz)   LMP 09/13/2012   SpO2 98%   BMI 30.01 kg/m    Body mass index is 30.01 kg/m .  Physical Exam     GENERAL: healthy, alert and no distress  NECK: full cervical ROM, right trapezius and SCM with " mild ttp  RESP: lungs clear to auscultation - no rales, rhonchi or wheezes  CV: regular rate and rhythm, normal S1 S2, no S3 or S4, no murmur, click or rub, no peripheral edema  MS: right upper arm with no visible rash, non-tender to palpation  Right UE with full range of motion and strength  NEURO: Normal strength and tone, mentation intact and speech normal  PSYCH: mentation appears normal, affect normal/bright            Assessment & Plan       Leticia was seen today for arm pain.    Diagnoses and all orders for this visit:    Other pruritus  Intermittent intense pruritic and painful episodes at right upper arm.   Will plan labs as below and trial of triamcinolone.  Unclear etiology at this time, consider dermatology consultation as needed as well.    -     Comprehensive metabolic panel (BMP + Alb, Alk Phos, ALT, AST, Total. Bili, TP)  -     CBC with platelets  -     TSH with free T4 reflex  -     ESR: Erythrocyte sedimentation rate  -     CRP, inflammation  -     triamcinolone (KENALOG) 0.1 % external ointment; Apply topically 2 times daily as needed for irritation (pruritis)    Need for influenza vaccination  -     REVIEW OF HEALTH MAINTENANCE PROTOCOL ORDERS  -     INFLUENZA VACCINE IM > 6 MONTHS VALENT IIV4 [86691]  -     VACCINE ADMINISTRATION, INITIAL               Return in about 2 months (around 11/29/2020) for Preventative visit.    CATHLEEN Schwab [facility]        Pre-Visit Planning - SB3 status not confirmed. Please review at visit if able. Thank you.    Chart review: Last seen by our office on 2/6/20 for a lump in the left breast. Mammogram with ultrasound completed on 2/10/20 and was negative.    2/17/20 patient was seen by Dr. Blue at our skin clinic. Shave excision performed on right index finger. 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    6/9/20 patient was seen by Dr. Blue for follow up on myxoid cyst. Patient was referred to [facility]  consult with hand surgeon.    Appointment Notes for this encounter:   a weird like itch on right arm   for like 3 months  also joint pain in knees    Questionnaires Reviewed/Assigned  No additional questionnaires are needed    [employer] message sent to patient. Awaiting response.   PAULINE CollinsN, RN  [facility]

## 2020-09-29 ENCOUNTER — OFFICE VISIT (OUTPATIENT)
Dept: FAMILY MEDICINE | Facility: CLINIC | Age: 50
End: 2020-09-29
Payer: COMMERCIAL

## 2020-09-29 VITALS
OXYGEN SATURATION: 98 % | WEIGHT: 164.1 LBS | HEIGHT: 62 IN | BODY MASS INDEX: 30.2 KG/M2 | DIASTOLIC BLOOD PRESSURE: 68 MMHG | TEMPERATURE: 98.4 F | SYSTOLIC BLOOD PRESSURE: 120 MMHG | HEART RATE: 86 BPM

## 2020-09-29 DIAGNOSIS — Z23 NEED FOR INFLUENZA VACCINATION: ICD-10-CM

## 2020-09-29 DIAGNOSIS — L29.89 OTHER PRURITUS: Primary | ICD-10-CM

## 2020-09-29 LAB
ERYTHROCYTE [DISTWIDTH] IN BLOOD BY AUTOMATED COUNT: 12.6 % (ref 10–15)
ERYTHROCYTE [SEDIMENTATION RATE] IN BLOOD BY WESTERGREN METHOD: 9 MM/H (ref 0–30)
HCT VFR BLD AUTO: 38.7 % (ref 35–47)
HGB BLD-MCNC: 13.4 G/DL (ref 11.7–15.7)
MCH RBC QN AUTO: 34.7 PG (ref 26.5–33)
MCHC RBC AUTO-ENTMCNC: 34.6 G/DL (ref 31.5–36.5)
MCV RBC AUTO: 100 FL (ref 78–100)
PLATELET # BLD AUTO: 294 10E9/L (ref 150–450)
RBC # BLD AUTO: 3.86 10E12/L (ref 3.8–5.2)
WBC # BLD AUTO: 5.2 10E9/L (ref 4–11)

## 2020-09-29 PROCEDURE — 85027 COMPLETE CBC AUTOMATED: CPT | Performed by: NURSE PRACTITIONER

## 2020-09-29 PROCEDURE — 99213 OFFICE O/P EST LOW 20 MIN: CPT | Mod: 25 | Performed by: NURSE PRACTITIONER

## 2020-09-29 PROCEDURE — 90686 IIV4 VACC NO PRSV 0.5 ML IM: CPT | Performed by: NURSE PRACTITIONER

## 2020-09-29 PROCEDURE — 86140 C-REACTIVE PROTEIN: CPT | Performed by: NURSE PRACTITIONER

## 2020-09-29 PROCEDURE — 36415 COLL VENOUS BLD VENIPUNCTURE: CPT | Performed by: NURSE PRACTITIONER

## 2020-09-29 PROCEDURE — 84443 ASSAY THYROID STIM HORMONE: CPT | Performed by: NURSE PRACTITIONER

## 2020-09-29 PROCEDURE — 90471 IMMUNIZATION ADMIN: CPT | Performed by: NURSE PRACTITIONER

## 2020-09-29 PROCEDURE — 85652 RBC SED RATE AUTOMATED: CPT | Performed by: NURSE PRACTITIONER

## 2020-09-29 PROCEDURE — 80053 COMPREHEN METABOLIC PANEL: CPT | Performed by: NURSE PRACTITIONER

## 2020-09-29 RX ORDER — TRIAMCINOLONE ACETONIDE 1 MG/G
OINTMENT TOPICAL 2 TIMES DAILY PRN
Qty: 30 G | Refills: 0 | Status: SHIPPED | OUTPATIENT
Start: 2020-09-29 | End: 2021-11-26

## 2020-09-29 ASSESSMENT — MIFFLIN-ST. JEOR: SCORE: 1317.6

## 2020-09-29 NOTE — RESULT ENCOUNTER NOTE
Blanche Kelly,     -Normal red blood cell (hgb) levels, normal white blood cell count and normal platelet levels.  -ESR (chronic inflammatory test) was normal.        Please send a iChange message or call [phone]  if you have any questions.      Cait Roberts, APRN, CNP  [place] [facility]    If you have further questions about the interpretation of your labs, [email or web address] is a good website to check out for further information.

## 2020-09-30 LAB
ALBUMIN SERPL-MCNC: 3.9 G/DL (ref 3.4–5)
ALP SERPL-CCNC: 61 U/L (ref 40–150)
ALT SERPL W P-5'-P-CCNC: 46 U/L (ref 0–50)
ANION GAP SERPL CALCULATED.3IONS-SCNC: 10 MMOL/L (ref 3–14)
AST SERPL W P-5'-P-CCNC: 33 U/L (ref 0–45)
BILIRUB SERPL-MCNC: 0.3 MG/DL (ref 0.2–1.3)
BUN SERPL-MCNC: 18 MG/DL (ref 7–30)
CALCIUM SERPL-MCNC: 9.1 MG/DL (ref 8.5–10.1)
CHLORIDE SERPL-SCNC: 106 MMOL/L (ref 94–109)
CO2 SERPL-SCNC: 21 MMOL/L (ref 20–32)
CREAT SERPL-MCNC: 0.87 MG/DL (ref 0.52–1.04)
CRP SERPL-MCNC: <2.9 MG/L (ref 0–8)
GFR SERPL CREATININE-BSD FRML MDRD: 78 ML/MIN/{1.73_M2}
GLUCOSE SERPL-MCNC: 90 MG/DL (ref 70–99)
POTASSIUM SERPL-SCNC: 4.2 MMOL/L (ref 3.4–5.3)
PROT SERPL-MCNC: 7.7 G/DL (ref 6.8–8.8)
SODIUM SERPL-SCNC: 137 MMOL/L (ref 133–144)
TSH SERPL DL<=0.005 MIU/L-ACNC: 2.4 MU/L (ref 0.4–4)

## 2020-10-01 NOTE — RESULT ENCOUNTER NOTE
Blanche Kelly,     -Liver and gallbladder tests are normal (ALT,AST, Alk phos, bilirubin), kidney function is normal (Cr, GFR), sodium is normal, potassium is normal, calcium is normal, glucose is normal.  -TSH (thyroid stimulating hormone) level is normal which indicates normal thyroid function.  -CRP (acute inflammatory test) was negative and reassuring.      Please let me know if your symptoms don't improve and of course if they worsen.        Please send a Fetch MD message or call [phone]  if you have any questions.      Cait Roberts, APRN, CNP  [place] [facility]    If you have further questions about the interpretation of your labs, [email or web address] is a good website to check out for further information.

## 2020-10-28 ENCOUNTER — OFFICE VISIT (OUTPATIENT)
Dept: FAMILY MEDICINE | Facility: CLINIC | Age: 50
End: 2020-10-28
Payer: COMMERCIAL

## 2020-10-28 VITALS
HEIGHT: 62 IN | TEMPERATURE: 97.3 F | OXYGEN SATURATION: 99 % | WEIGHT: 160 LBS | BODY MASS INDEX: 29.44 KG/M2 | SYSTOLIC BLOOD PRESSURE: 124 MMHG | HEART RATE: 82 BPM | DIASTOLIC BLOOD PRESSURE: 88 MMHG

## 2020-10-28 DIAGNOSIS — Z23 NEED FOR SHINGLES VACCINE: ICD-10-CM

## 2020-10-28 DIAGNOSIS — Z12.11 COLON CANCER SCREENING: ICD-10-CM

## 2020-10-28 DIAGNOSIS — Z00.00 ROUTINE GENERAL MEDICAL EXAMINATION AT A HEALTH CARE FACILITY: Primary | ICD-10-CM

## 2020-10-28 DIAGNOSIS — E78.5 HYPERLIPIDEMIA LDL GOAL <130: ICD-10-CM

## 2020-10-28 DIAGNOSIS — N94.3 PREMENSTRUAL TENSION SYNDROME: ICD-10-CM

## 2020-10-28 DIAGNOSIS — B00.1 RECURRENT COLD SORES: ICD-10-CM

## 2020-10-28 DIAGNOSIS — M25.531 RIGHT WRIST PAIN: ICD-10-CM

## 2020-10-28 DIAGNOSIS — E03.4 HYPOTHYROIDISM DUE TO ACQUIRED ATROPHY OF THYROID: ICD-10-CM

## 2020-10-28 PROCEDURE — 90750 HZV VACC RECOMBINANT IM: CPT | Performed by: PHYSICIAN ASSISTANT

## 2020-10-28 PROCEDURE — 36415 COLL VENOUS BLD VENIPUNCTURE: CPT | Performed by: PHYSICIAN ASSISTANT

## 2020-10-28 PROCEDURE — 90471 IMMUNIZATION ADMIN: CPT | Performed by: PHYSICIAN ASSISTANT

## 2020-10-28 PROCEDURE — 80061 LIPID PANEL: CPT | Performed by: PHYSICIAN ASSISTANT

## 2020-10-28 PROCEDURE — 99214 OFFICE O/P EST MOD 30 MIN: CPT | Mod: 25 | Performed by: PHYSICIAN ASSISTANT

## 2020-10-28 PROCEDURE — 99396 PREV VISIT EST AGE 40-64: CPT | Mod: 25 | Performed by: PHYSICIAN ASSISTANT

## 2020-10-28 RX ORDER — ACYCLOVIR 200 MG/1
200 CAPSULE ORAL
Qty: 25 CAPSULE | Refills: 1 | Status: SHIPPED | OUTPATIENT
Start: 2020-10-28 | End: 2021-11-26

## 2020-10-28 ASSESSMENT — MIFFLIN-ST. JEOR: SCORE: 1299.01

## 2020-10-28 NOTE — PROGRESS NOTES
SUBJECTIVE:   CC: Leticia Aquino is an 50 year old woman who presents for preventive health visit.       Patient has been advised of split billing requirements and indicates understanding: Yes  Healthy Habits:  Answers for HPI/ROS submitted by the patient on 10/28/2020   Annual Exam:  Frequency of exercise:: 2-3 days/week  Getting at least 3 servings of Calcium per day:: Yes  Diet:: Carbohydrate counting  Taking medications regularly:: Yes  Medication side effects:: None  Bi-annual eye exam:: Yes  Dental care twice a year:: Yes  Sleep apnea or symptoms of sleep apnea:: None  Additional concerns today:: Yes  Duration of exercise:: 15-30 minutes    Started a calcium supplement - is not sure amount, but will call back and let us know to update med list.    Review chronic conditions:  HLD - doing well with atorvastatin. No SE. Good compliance. Started on statin therapy due to current guideline recommendations with persistent LDL >190.    Hypothyroidism - feeling euthyroid. No changes in hair/skin/nails or bowel pattern.    Anxiety/depression - very stable on prozac. Hx of premenstrual mood disorder then stayed on it for susana-menopausal sx. Hx of hysterectomy due to menorrhagia in 2012.     Recurrent cold sores - takes acyclovir for outbreaks. Estimates only 3x per year.    Just had some labs done for a recurrent itchy spot on her R arm. Reports everything was normal and was given script for steroid cream to try, but has not yet done it. Seems to be better, but does still itch her sometimes. Will plan on giving it a try.    Pain in wrist that goes up to elbow- right wrist - burning sensation-constant - x 2 weeks. R-handed. 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 Has an ergonomic mouse. Sochx: work position has changed in general and requiring more computer work than what she had previously.   Rates as 4-5/10.  Treatment: wrist brace for just a little while this morning, but not  otherwise.   Takes ibuprofen more consistently       Today's PHQ-2 Score:   PHQ-2 (  [employer]) 10/28/2020 2020   Q1: Little interest or pleasure in doing things 0 0   Q2: Feeling down, depressed or hopeless 0 0   PHQ-2 Score 0 0   Q1: Little interest or pleasure in doing things Not at all Not at all   Q2: Feeling down, depressed or hopeless Not at all Not at all   PHQ-2 Score 0 0       Abuse: Current or Past(Physical, Sexual or Emotional)- No  Do you feel safe in your environment? Yes        Social History     Tobacco Use     Smoking status: Former Smoker     Smokeless tobacco: Never Used     Tobacco comment: QUIT    Substance Use Topics     Alcohol use: Yes     Comment: 2-3 per week      If you drink alcohol do you typically have >3 drinks per day or >7 drinks per week? No                     Reviewed orders with patient.  Reviewed health maintenance and updated orders accordingly - Yes  Patient Active Problem List   Diagnosis     Premenstrual tension syndrome     CARDIOVASCULAR SCREENING; LDL GOAL LESS THAN 160     Pain in joint, ankle and foot     Hypothyroidism due to acquired atrophy of thyroid     Hyperlipidemia LDL goal <130     Past Surgical History:   Procedure Laterality Date     GYN SURGERY  11    ablation     HYSTERECTOMY SUPRACERVICAL  2012     [facility] NONSPECIFIC PROCEDURE  1973    Right Inguinal Hernia Repair     [facility] NONSPECIFIC PROCEDURE      D&C for Spontaneous        Social History     Tobacco Use     Smoking status: Former Smoker     Smokeless tobacco: Never Used     Tobacco comment: QUIT    Substance Use Topics     Alcohol use: Yes     Comment: 2-3 per week      Family History   Problem Relation Age of Onset     Diabetes Father         Insulin Dependent     Cardiovascular Father         Triple by-pass in [phone]     Thyroid Disease Mother         Hypothyroid     Hypertension Mother      Hyperlipidemia Mother      Cerebrovascular Disease Mother 70        TIA -  former smoker     Cancer Maternal Grandfather      Thyroid Disease Daughter         Dx at age 10     Breast Cancer Half-Sister 45     Colon Cancer No family hx of      Osteoporosis No family hx of          Current Outpatient Medications   Medication Sig Dispense Refill     acyclovir (ZOVIRAX) 200 MG capsule Take 1 capsule (200 mg) by mouth 5 times daily 25 capsule 1     atorvastatin 40 MG PO tablet Take 1 tablet (40 mg) by mouth daily 90 tablet 2     FLUoxetine (PROZAC) 20 MG capsule Take 2 capsules (40 mg) by mouth daily 180 capsule 2     IBUPROFEN 600 MG OR TABS take 1 every 8 hrs if needed  60 prn     SYNTHROID 100 MCG tablet TAKE 1 TABLET DAILY 90 tablet 2     triamcinolone (KENALOG) 0.1 % external ointment Apply topically 2 times daily as needed for irritation (pruritis) 30 g 0     Allergies   Allergen Reactions     Augmentin GI Disturbance     Codeine      gi     Pseudoephedrine Hcl      restlessness       Mammogram Screening: Patient under age 50, mutual decision reflected in health maintenance.      Pertinent mammograms are reviewed under the imaging tab.  History of abnormal Pap smear: NO - age 30-65 PAP every 5 years with negative HPV co-testing recommended  PAP / HPV Latest Ref Rng & Units 3/15/2019 2/28/2014 5/3/2006   PAP - NIL NIL NIL   HPV 16 DNA NEG:Negative Negative - -   HPV 18 DNA NEG:Negative Negative - -   OTHER HR HPV NEG:Negative Negative - -     Reviewed and updated as needed this visit by clinical staff  Tobacco  Allergies  Meds  Problems  Med Hx  Surg Hx  Fam Hx  Soc Hx          Reviewed and updated as needed this visit by Provider  Tobacco  Allergies  Meds  Problems  Med Hx  Surg Hx  Fam Hx  Soc Hx             ROS:  CONSTITUTIONAL: NEGATIVE for fever, chills, change in weight  INTEGUMENTARY/SKIN: NEGATIVE for worrisome rashes, moles or lesions  EYES: NEGATIVE for vision changes or irritation  ENT: NEGATIVE for ear, mouth and throat problems  RESP: NEGATIVE for significant  "cough or SOB  BREAST: NEGATIVE for masses, tenderness or discharge.  CV: NEGATIVE for chest pain, palpitations or peripheral edema  GI: NEGATIVE for nausea, abdominal pain, heartburn, or change in bowel habits  : NEGATIVE for unusual urinary or vaginal symptoms. No vaginal bleeding due to hysterectomy.  MUSCULOSKELETAL: + for R wrist pain. See HPI. NEGATIVE for significant arthralgias or myalgia  NEURO: NEGATIVE for weakness, dizziness or paresthesias  PSYCHIATRIC: NEGATIVE for changes in mood or affect     OBJECTIVE:   /88   Pulse 82   Temp 97.3  F (36.3  C)   Ht 1.575 m (5' 2\")   Wt 72.6 kg (160 lb)   LMP 09/13/2012   SpO2 99%   BMI 29.26 kg/m    EXAM:  GENERAL: healthy, alert and no distress  EYES: Eyes grossly normal to inspection, PERRL and conjunctivae and sclerae normal  HENT: ear canals and TM's normal, nose and mouth without ulcers or lesions  NECK: no adenopathy, no asymmetry, masses, or scars and thyroid normal to palpation  RESP: lungs clear to auscultation - no rales, rhonchi or wheezes  BREAST: declined by pt   CV: regular rate and rhythm, normal S1 S2, no S3 or S4, no murmur, click or rub, no peripheral edema and peripheral pulses strong  ABDOMEN: soft, nontender, no hepatosplenomegaly, no masses and bowel sounds normal  MS: Can demonstrate FROM of R wrist, but has some central ventral discomfort across flexor tendons and mild discomfort over forearm flexor muscle insertion just proximal to medial epicondyle. No bony TTP. no other gross musculoskeletal defects noted, no edema  SKIN: no suspicious lesions or rashes  NEURO: Normal strength and tone, mentation intact and speech normal  PSYCH: mentation appears normal, affect normal/bright    Diagnostic Test Results:  Labs reviewed in Epic    ASSESSMENT/PLAN:   1. Routine general medical examination at a health care facility  Reviewed preventative health recommendations for age.    2. Hyperlipidemia LDL goal <130  Improved after starting " "stain therapy. Can extend script for 1 yr from today's visit. Repeat labs for stability.  - Lipid panel reflex to direct LDL Fasting    3. Hypothyroidism due to acquired atrophy of thyroid  Feeling euthyroid. Can extend script for 1 yr from last labs.    4. Premenstrual tension syndrome  Stable/doing well. Can extend script for 1 yr from today's visit.    5. Recurrent cold sores  Very stable, continue to use antiviral therapy as needed for outbreaks.  - acyclovir (ZOVIRAX) 200 MG capsule; Take 1 capsule (200 mg) by mouth 5 times daily  Dispense: 25 capsule; Refill: 1    6. Right wrist pain  Possible tendonitis. Pt wishes to continue with conservative management and trial of bracing. If not improving or worsening pain/limitation, can refer to ortho.    7. Colon cancer screening  - GASTROENTEROLOGY ADULT REF PROCEDURE ONLY; Future    8. Need for shingles vaccine  - ZOSTER VACCINE RECOMBINANT ADJUVANTED IM NJX  - ADMIN 1st VACCINE    Patient has been advised of split billing requirements and indicates understanding: No  COUNSELING:   Reviewed preventive health counseling, as reflected in patient instructions       Regular exercise       Healthy diet/nutrition       Immunizations    Vaccinated for: Zoster          Colon cancer screening    Estimated body mass index is 29.26 kg/m  as calculated from the following:    Height as of this encounter: 1.575 m (5' 2\").    Weight as of this encounter: 72.6 kg (160 lb).    Weight management plan: Discussed healthy diet and exercise guidelines    She reports that she has quit smoking. She has never used smokeless tobacco.      Counseling Resources:  ATP IV Guidelines  Pooled Cohorts Equation Calculator  Breast Cancer Risk Calculator  BRCA-Related Cancer Risk Assessment: FHS-7 Tool  FRAX Risk Assessment  ICSI Preventive Guidelines  Dietary Guidelines for Americans, 2010  USDA's MyPlate  ASA Prophylaxis  Lung CA Screening    Janina Medina PA-C  [facility] " [place]

## 2020-10-29 LAB
CHOLEST SERPL-MCNC: 225 MG/DL
HDLC SERPL-MCNC: 82 MG/DL
LDLC SERPL CALC-MCNC: 135 MG/DL
NONHDLC SERPL-MCNC: 143 MG/DL
TRIGL SERPL-MCNC: 41 MG/DL

## 2020-10-30 DIAGNOSIS — Z11.59 ENCOUNTER FOR SCREENING FOR OTHER VIRAL DISEASES: Primary | ICD-10-CM

## 2020-11-03 DIAGNOSIS — E78.5 HYPERLIPIDEMIA LDL GOAL <130: ICD-10-CM

## 2020-11-03 RX ORDER — ATORVASTATIN CALCIUM 40 MG/1
40 TABLET, FILM COATED ORAL DAILY
Qty: 90 TABLET | Refills: 3 | Status: SHIPPED | OUTPATIENT
Start: 2020-11-03 | End: 2020-11-12

## 2020-11-09 NOTE — PROGRESS NOTES
[facility] - PRIMARY CARE SKIN    CC: skin cancer screening (full-body)  SUBJECTIVE:   Leticia Aquino is a(n) 50 year old female who presents to clinic today for a full-body skin exam.    Issue one :  No particular skin concerns           Family Medical History  Skin Cancer: NO  Eczema Psoriasis Lupus   NO NO NO     Occupation: computer work (indoor).      Refer to electronic medical record (EMR) for past medical history and medications.      ROS: 14 point review of systems was negative except the symptoms listed above in the HPI.        OBJECTIVE:   GENERAL: healthy, alert and no distress.  HEENT: PERRL. Conjunctiva, sclera clear.  SKIN: Thao Skin Type - II.  This patient was examined from the top of the head to the bottom of the feet  including scalp, face, neck, trunk, buttocks, both arms, both legs, both hands, both feet, and all fingers and toes. The dermatoscope was used to help evaluate pigmented lesions.  Skin Pertinent Findings:    Trunk, arms, legs,  :           Brown, macule(s) most consistent with benign solar lentigo          Slightly raised, red lesion(s) consistent with capillary hemangioma          Brown macules of various sizes and shapes most consistent with (benign) melanocytic nevi            ASSESSMENT:     Encounter Diagnoses   Name Primary?     Skin exam for malignant neoplasm Yes     Melanocytic nevi of trunk      Capillary hemangioma      MDM: .    PLAN:   Patient Instructions   Skin exam in one year    SUN PROTECTION INSTRUCTIONS  Sun damage can lead to skin cancer and premature aging of the skin.      The best way to protect from sun damage to your skin is to avoid the sun during peak hours (10 am - 2 pm) even on overcast days.    Never use tanning beds. Tanning beds are associated with much higher risks of skin cancer.    All tanning damages the skin. Aim for ivory skin year round and you will have less trouble with your skin in years to come. There is no merit in getting  "\"a base tan\" before a warm weather vacation, as any tanning indicates your body's response to sun damage.    Stop smoking. Smokers have higher rates of skin cancer and also have premature skin wrinkling.    Use UPF sun-protective clothing, which while more expensive initially provides longer lasting coverage without having to worry about remembering to re-apply.  1. Wear a wide-brimmed hat and sunglasses.   2. Wear sun-protective clothing.  [employer] and other [employer] make sun protective clothing that are stylish, comfortable and cool.   [employer] and other [employer] make UV arm sleeves suitable for golfing, gardening and other activities.    Sunscreen instructions:  1. Use sunscreens with Zinc Oxide, Titanium Dioxide or Avobenzone to protect from UVA rays.  2. Use SPF 30-50+ to protect from UVB rays.  3. Re-apply every 2 hours even if water resistant.  4. Apply on your face every day even when cloudy and even in the winter. UVA \"aging rays\" penetrate window glass and are just as strong in the winter as in the summer.    FYI  You should use about 3 tablespoons of sunscreen to protect your whole body. Thus a typical eight ounce bottle of sunscreen should last 4 applications. Remember, that the SPF rating is compromised if you don t apply enough. Most people only apply 1/2 - 1/3 of the amount they need. Also don t forget areas such as your ears, feet, upper back and harder to reach places. Keep in mind that these amounts should be increased for larger body sizes.    Sunscreens with titanium dioxide and/or zinc oxide in the active ingredients are physical blockers as opposed to chemical blockers. Chemical-free sunscreens should not irritate the skin.    Spray-on sunscreens may be used for touch-up application only, not as a base layer. Also, use with caution around small children due to inhalation risk.    SPF means sun protection factor, which is just the degree to which the sunscreen can protect " against UVB rays. There is no rating system for UVA rays. SPF is calculated as the time skin will burn when sunscreen is applied vs. skin without sunscreen.    Water resistant sunscreens should be re-applied every 1-2 hours.    Product Recommendations:    Consider use of sunscreen sticks with Zinc Oxide and Titanium Dioxide active ingredients such as Neutrogena Pure&Free Baby Sunscreen Stick.    Good examples include: Blue Lizard, EltaMD, Solbar    Good daily moisturizers with SPF: Vanicream, CeraVe.    For sensitive skin, consider : [employer] Essential Defense Mineral Shield Broad Spectrum SPF 35    Men: consider use of Neutrogena Triple Protect Facial Lotion    Avoid retinyl palmitate products.  Avoid combination products that include both sunscreen and insect repellant, as sunscreen should be applied every 2 hours, but insect repellant should not be applied as frequently.    For more information:  https://www.skincancer.org/prevention/sun-protection/sunscreen/sunscreens-safe-and-effective        The patient was counseled about sunscreens and sun avoidance. The patient was counseled to check the skin regularly and report any lesion that is new, changing, itching, scabbing, bleeding or otherwise bothersome. The patient was discharged ambulatory and in stable condition.    Educational brochures given to patient: skin cancer.    TT: 25 minutes.

## 2020-11-10 ENCOUNTER — OFFICE VISIT (OUTPATIENT)
Dept: FAMILY MEDICINE | Facility: CLINIC | Age: 50
End: 2020-11-10
Payer: COMMERCIAL

## 2020-11-10 VITALS — SYSTOLIC BLOOD PRESSURE: 118 MMHG | DIASTOLIC BLOOD PRESSURE: 86 MMHG

## 2020-11-10 DIAGNOSIS — Z12.83 SKIN EXAM FOR MALIGNANT NEOPLASM: Primary | ICD-10-CM

## 2020-11-10 DIAGNOSIS — I78.1 CAPILLARY HEMANGIOMA: ICD-10-CM

## 2020-11-10 DIAGNOSIS — D22.5 MELANOCYTIC NEVI OF TRUNK: ICD-10-CM

## 2020-11-10 PROCEDURE — 99213 OFFICE O/P EST LOW 20 MIN: CPT | Performed by: FAMILY MEDICINE

## 2020-11-10 NOTE — LETTER
11/10/2020         RE: Leticia Aquino  [address] [place] [address]        Dear Colleague,    Thank you for referring your patient, Leticia Aquino, to the [facility]. Please see a copy of my visit note below.    [facility] (formerly [facility]3] - PRIMARY CARE SKIN    CC: skin cancer screening (full-body)  SUBJECTIVE:   Leticia Aquino is a(n) 50 year old female who presents to clinic today for a full-body skin exam.    Issue one :  No particular skin concerns           Family Medical History  Skin Cancer: NO  Eczema Psoriasis Lupus   NO NO NO     Occupation: computer work (indoor).      Refer to electronic medical record (EMR) for past medical history and medications.      ROS: 14 point review of systems was negative except the symptoms listed above in the HPI.        OBJECTIVE:   GENERAL: healthy, alert and no distress.  HEENT: PERRL. Conjunctiva, sclera clear.  SKIN: Thao Skin Type - II.  This patient was examined from the top of the head to the bottom of the feet  including scalp, face, neck, trunk, buttocks, both arms, both legs, both hands, both feet, and all fingers and toes. The dermatoscope was used to help evaluate pigmented lesions.  Skin Pertinent Findings:    Trunk, arms, legs,  :           Brown, macule(s) most consistent with benign solar lentigo          Slightly raised, red lesion(s) consistent with capillary hemangioma          Brown macules of various sizes and shapes most consistent with (benign) melanocytic nevi            ASSESSMENT:     Encounter Diagnoses   Name Primary?     Skin exam for malignant neoplasm Yes     Melanocytic nevi of trunk      Capillary hemangioma      MDM: .    PLAN:   Patient Instructions   Skin exam in one year    SUN PROTECTION INSTRUCTIONS  Sun damage can lead to skin cancer and premature aging of the skin.      The best way to protect from sun damage to your skin is to avoid the sun during peak hours (10 am - 2 pm) even on  "overcast days.    Never use tanning beds. Tanning beds are associated with much higher risks of skin cancer.    All tanning damages the skin. Aim for ivory skin year round and you will have less trouble with your skin in years to come. There is no merit in getting \"a base tan\" before a warm weather vacation, as any tanning indicates your body's response to sun damage.    Stop smoking. Smokers have higher rates of skin cancer and also have premature skin wrinkling.    Use UPF sun-protective clothing, which while more expensive initially provides longer lasting coverage without having to worry about remembering to re-apply.  1. Wear a wide-brimmed hat and sunglasses.   2. Wear sun-protective clothing.  Med[employer] and other [employer] make sun protective clothing that are stylish, comfortable and cool.   [employer] and other Opath[employer] make UV arm sleeves suitable for golfing, gardening and other activities.    Sunscreen instructions:  1. Use sunscreens with Zinc Oxide, Titanium Dioxide or Avobenzone to protect from UVA rays.  2. Use SPF 30-50+ to protect from UVB rays.  3. Re-apply every 2 hours even if water resistant.  4. Apply on your face every day even when cloudy and even in the winter. UVA \"aging rays\" penetrate window glass and are just as strong in the winter as in the summer.    FYI  You should use about 3 tablespoons of sunscreen to protect your whole body. Thus a typical eight ounce bottle of sunscreen should last 4 applications. Remember, that the SPF rating is compromised if you don t apply enough. Most people only apply 1/2 - 1/3 of the amount they need. Also don t forget areas such as your ears, feet, upper back and harder to reach places. Keep in mind that these amounts should be increased for larger body sizes.    Sunscreens with titanium dioxide and/or zinc oxide in the active ingredients are physical blockers as opposed to chemical blockers. Chemical-free sunscreens should not irritate " the skin.    Spray-on sunscreens may be used for touch-up application only, not as a base layer. Also, use with caution around small children due to inhalation risk.    SPF means sun protection factor, which is just the degree to which the sunscreen can protect against UVB rays. There is no rating system for UVA rays. SPF is calculated as the time skin will burn when sunscreen is applied vs. skin without sunscreen.    Water resistant sunscreens should be re-applied every 1-2 hours.    Product Recommendations:    Consider use of sunscreen sticks with Zinc Oxide and Titanium Dioxide active ingredients such as Neutrogena Pure&Free Baby Sunscreen Stick.    Good examples include: Blue Lizard, EltaMD, Solbar    Good daily moisturizers with SPF: Vanicream, CeraVe.    For sensitive skin, consider : SkinMedica Essential Defense Mineral Shield Broad Spectrum SPF 35    Men: consider use of Neutrogena Triple Protect Facial Lotion    Avoid retinyl palmitate products.  Avoid combination products that include both sunscreen and insect repellant, as sunscreen should be applied every 2 hours, but insect repellant should not be applied as frequently.    For more information:  https://www.skincancer.org/prevention/sun-protection/sunscreen/sunscreens-safe-and-effective        The patient was counseled about sunscreens and sun avoidance. The patient was counseled to check the skin regularly and report any lesion that is new, changing, itching, scabbing, bleeding or otherwise bothersome. The patient was discharged ambulatory and in stable condition.    Educational brochures given to patient: skin cancer.    TT: 25 minutes.        Again, thank you for allowing me to participate in the care of your patient.        Sincerely,        Shabana Blue MD

## 2020-11-10 NOTE — PATIENT INSTRUCTIONS
"Skin exam in one year    SUN PROTECTION INSTRUCTIONS  Sun damage can lead to skin cancer and premature aging of the skin.      The best way to protect from sun damage to your skin is to avoid the sun during peak hours (10 am - 2 pm) even on overcast days.    Never use tanning beds. Tanning beds are associated with much higher risks of skin cancer.    All tanning damages the skin. Aim for ivory skin year round and you will have less trouble with your skin in years to come. There is no merit in getting \"a base tan\" before a warm weather vacation, as any tanning indicates your body's response to sun damage.    Stop smoking. Smokers have higher rates of skin cancer and also have premature skin wrinkling.    Use UPF sun-protective clothing, which while more expensive initially provides longer lasting coverage without having to worry about remembering to re-apply.  1. Wear a wide-brimmed hat and sunglasses.   2. Wear sun-protective clothing.  [employer] and other [employer] make sun protective clothing that are stylish, comfortable and cool.   [employer] and other [employer] make UV arm sleeves suitable for golfing, gardening and other activities.    Sunscreen instructions:  1. Use sunscreens with Zinc Oxide, Titanium Dioxide or Avobenzone to protect from UVA rays.  2. Use SPF 30-50+ to protect from UVB rays.  3. Re-apply every 2 hours even if water resistant.  4. Apply on your face every day even when cloudy and even in the winter. UVA \"aging rays\" penetrate window glass and are just as strong in the winter as in the summer.    FYI  You should use about 3 tablespoons of sunscreen to protect your whole body. Thus a typical eight ounce bottle of sunscreen should last 4 applications. Remember, that the SPF rating is compromised if you don t apply enough. Most people only apply 1/2 - 1/3 of the amount they need. Also don t forget areas such as your ears, feet, upper back and harder to reach places. Keep in mind " that these amounts should be increased for larger body sizes.    Sunscreens with titanium dioxide and/or zinc oxide in the active ingredients are physical blockers as opposed to chemical blockers. Chemical-free sunscreens should not irritate the skin.    Spray-on sunscreens may be used for touch-up application only, not as a base layer. Also, use with caution around small children due to inhalation risk.    SPF means sun protection factor, which is just the degree to which the sunscreen can protect against UVB rays. There is no rating system for UVA rays. SPF is calculated as the time skin will burn when sunscreen is applied vs. skin without sunscreen.    Water resistant sunscreens should be re-applied every 1-2 hours.    Product Recommendations:    Consider use of sunscreen sticks with Zinc Oxide and Titanium Dioxide active ingredients such as Neutrogena Pure&Free Baby Sunscreen Stick.    Good examples include: Blue Lizard, EltaMD, Solbar    Good daily moisturizers with SPF: Vanicream, CeraVe.    For sensitive skin, consider : SkinMedica Essential Defense Mineral Shield Broad Spectrum SPF 35    Men: consider use of Neutrogena Triple Protect Facial Lotion    Avoid retinyl palmitate products.  Avoid combination products that include both sunscreen and insect repellant, as sunscreen should be applied every 2 hours, but insect repellant should not be applied as frequently.    For more information:  https://www.skincancer.org/prevention/sun-protection/sunscreen/sunscreens-safe-and-effective

## 2020-11-12 DIAGNOSIS — E78.5 HYPERLIPIDEMIA LDL GOAL <130: ICD-10-CM

## 2020-11-12 RX ORDER — ATORVASTATIN CALCIUM 40 MG/1
TABLET, FILM COATED ORAL
Qty: 90 TABLET | Refills: 3 | Status: SHIPPED | OUTPATIENT
Start: 2020-11-12 | End: 2021-11-02

## 2020-11-12 NOTE — TELEPHONE ENCOUNTER
Prescription approved per [facility] Refill Protocol.    Erica MOSS RN  EP Triage

## 2020-11-16 DIAGNOSIS — Z11.59 ENCOUNTER FOR SCREENING FOR OTHER VIRAL DISEASES: ICD-10-CM

## 2020-11-16 PROCEDURE — U0003 INFECTIOUS AGENT DETECTION BY NUCLEIC ACID (DNA OR RNA); SEVERE ACUTE RESPIRATORY SYNDROME CORONAVIRUS 2 (SARS-COV-2) (CORONAVIRUS DISEASE [COVID-19]), AMPLIFIED PROBE TECHNIQUE, MAKING USE OF HIGH THROUGHPUT TECHNOLOGIES AS DESCRIBED BY CMS-2020-01-R: HCPCS | Performed by: INTERNAL MEDICINE

## 2020-11-17 LAB
SARS-COV-2 RNA SPEC QL NAA+PROBE: NOT DETECTED
SPECIMEN SOURCE: NORMAL

## 2020-11-19 ENCOUNTER — HOSPITAL ENCOUNTER (OUTPATIENT)
Facility: CLINIC | Age: 50
Discharge: HOME | End: 2020-11-19
Attending: INTERNAL MEDICINE | Admitting: INTERNAL MEDICINE
Payer: COMMERCIAL

## 2020-11-19 VITALS
BODY MASS INDEX: 29.44 KG/M2 | SYSTOLIC BLOOD PRESSURE: 117 MMHG | DIASTOLIC BLOOD PRESSURE: 86 MMHG | WEIGHT: 160 LBS | RESPIRATION RATE: 16 BRPM | TEMPERATURE: 100 F | HEART RATE: 75 BPM | OXYGEN SATURATION: 98 % | HEIGHT: 62 IN

## 2020-11-19 LAB — COLONOSCOPY: NORMAL

## 2020-11-19 PROCEDURE — 45380 COLONOSCOPY AND BIOPSY: CPT | Mod: PT | Performed by: INTERNAL MEDICINE

## 2020-11-19 PROCEDURE — 250N000011 HC RX IP 250 OP 636: Performed by: INTERNAL MEDICINE

## 2020-11-19 PROCEDURE — 88305 TISSUE EXAM BY PATHOLOGIST: CPT | Mod: TC | Performed by: INTERNAL MEDICINE

## 2020-11-19 PROCEDURE — 45385 COLONOSCOPY W/LESION REMOVAL: CPT | Performed by: INTERNAL MEDICINE

## 2020-11-19 PROCEDURE — 88305 TISSUE EXAM BY PATHOLOGIST: CPT | Mod: 26 | Performed by: PATHOLOGY

## 2020-11-19 PROCEDURE — G0500 MOD SEDAT ENDO SERVICE >5YRS: HCPCS | Performed by: INTERNAL MEDICINE

## 2020-11-19 RX ORDER — ONDANSETRON 4 MG/1
4 TABLET, ORALLY DISINTEGRATING ORAL EVERY 6 HOURS PRN
Status: DISCONTINUED | OUTPATIENT
Start: 2020-11-19 | End: 2020-11-19 | Stop reason: HOSPADM

## 2020-11-19 RX ORDER — NALOXONE HYDROCHLORIDE 0.4 MG/ML
0.4 INJECTION, SOLUTION INTRAMUSCULAR; INTRAVENOUS; SUBCUTANEOUS
Status: DISCONTINUED | OUTPATIENT
Start: 2020-11-19 | End: 2020-11-19

## 2020-11-19 RX ORDER — NALOXONE HYDROCHLORIDE 0.4 MG/ML
0.2 INJECTION, SOLUTION INTRAMUSCULAR; INTRAVENOUS; SUBCUTANEOUS
Status: DISCONTINUED | OUTPATIENT
Start: 2020-11-19 | End: 2020-11-19

## 2020-11-19 RX ORDER — ONDANSETRON 2 MG/ML
4 INJECTION INTRAMUSCULAR; INTRAVENOUS EVERY 6 HOURS PRN
Status: DISCONTINUED | OUTPATIENT
Start: 2020-11-19 | End: 2020-11-19 | Stop reason: HOSPADM

## 2020-11-19 RX ORDER — FENTANYL CITRATE 50 UG/ML
INJECTION, SOLUTION INTRAMUSCULAR; INTRAVENOUS PRN
Status: DISCONTINUED | OUTPATIENT
Start: 2020-11-19 | End: 2020-11-19 | Stop reason: HOSPADM

## 2020-11-19 RX ORDER — FLUMAZENIL 0.1 MG/ML
0.2 INJECTION, SOLUTION INTRAVENOUS
Status: DISCONTINUED | OUTPATIENT
Start: 2020-11-19 | End: 2020-11-19 | Stop reason: HOSPADM

## 2020-11-19 RX ORDER — NALOXONE HYDROCHLORIDE 0.4 MG/ML
0.2 INJECTION, SOLUTION INTRAMUSCULAR; INTRAVENOUS; SUBCUTANEOUS
Status: DISCONTINUED | OUTPATIENT
Start: 2020-11-19 | End: 2020-11-19 | Stop reason: HOSPADM

## 2020-11-19 RX ORDER — LIDOCAINE 40 MG/G
CREAM TOPICAL
Status: DISCONTINUED | OUTPATIENT
Start: 2020-11-19 | End: 2020-11-19 | Stop reason: HOSPADM

## 2020-11-19 RX ORDER — PROCHLORPERAZINE MALEATE 10 MG
10 TABLET ORAL EVERY 6 HOURS PRN
Status: DISCONTINUED | OUTPATIENT
Start: 2020-11-19 | End: 2020-11-19 | Stop reason: HOSPADM

## 2020-11-19 RX ORDER — NALOXONE HYDROCHLORIDE 0.4 MG/ML
0.4 INJECTION, SOLUTION INTRAMUSCULAR; INTRAVENOUS; SUBCUTANEOUS
Status: DISCONTINUED | OUTPATIENT
Start: 2020-11-19 | End: 2020-11-19 | Stop reason: HOSPADM

## 2020-11-19 RX ORDER — ONDANSETRON 2 MG/ML
4 INJECTION INTRAMUSCULAR; INTRAVENOUS
Status: DISCONTINUED | OUTPATIENT
Start: 2020-11-19 | End: 2020-11-19 | Stop reason: HOSPADM

## 2020-11-19 SDOH — HEALTH STABILITY: MENTAL HEALTH: HOW OFTEN DO YOU HAVE 6 OR MORE DRINKS ON ONE OCCASION?: NOT ASKED

## 2020-11-19 SDOH — HEALTH STABILITY: MENTAL HEALTH: HOW OFTEN DO YOU HAVE A DRINK CONTAINING ALCOHOL?: NOT ASKED

## 2020-11-19 SDOH — HEALTH STABILITY: MENTAL HEALTH: HOW MANY STANDARD DRINKS CONTAINING ALCOHOL DO YOU HAVE ON A TYPICAL DAY?: NOT ASKED

## 2020-11-19 ASSESSMENT — MIFFLIN-ST. JEOR: SCORE: 1299.01

## 2020-11-19 NOTE — LETTER
November 5, 2020      Leticia Aquino  [address] [place] [address]        Dear Leticia,     Please be aware that coverage of these services is subject to the terms and limitations of your health insurance plan.  Call member services at your health plan with any benefit or coverage questions.    Thank you for choosing [facility]. You are scheduled for the following service(s):    Date:  11/19/2020             Procedure:  COLONOSCOPY  Doctor:        Dr. Mateo Daniels   Arrival Time:  12:00 pm *Enter and check in at the Main Hospital Entrance*  Procedure Time:  12:30 pm      Location:   [facility]        Endoscopy Department, First Floor         [address] [place], [place] [address]      [phone] or [phone] ([facility]) to reschedule      MIRALAX -GATORADE  PREP  Colonoscopy is the most accurate test to detect colon polyps and colon cancer; and the only test where polyps can be removed. During this procedure, a doctor examines the lining of your large intestine and rectum through a flexible tube.   Transportation  You must arrange for a ride for the day of your procedure with a responsible adult. A taxi , Uber, etc, is not an option unless you are accompanied by a responsible adult. If you fail to arrange transportation with a responsible adult, your procedure will be cancelled and rescheduled.    Purchase the  following supplies at your local pharmacy:  - 2 (two) bisacodyl tablets: each tablet contains 5 mg.  (Dulcolax  laxative NOT Dulcolax  stool softener)   - 1 (one) 8.3 oz bottle of Polyethylene Glycol (PEG) 3350 Powder   (MiraLAX , Smooth LAX , ClearLAX  or equivalent)  - 64 oz Gatorade    Regular Gatorade, Gatorade G2 , Powerade , Powerade Zero  or Pedialyte  is acceptable. Red colored flavors are not allowed; all other colors (yellow, green, orange, purple and blue) are okay. It is also okay to buy two 2.12 oz packets of powdered  Gatorade that can be mixed with water to a total volume of 64 oz of liquid.  - 1 (one) 10 oz bottle of Magnesium Citrate (Red colored flavors are not allowed)  It is also okay for you to use a 0.5 oz package of powdered magnesium citrate (17 g) mixed with 10 oz of water.      PREPARATION FOR COLONOSCOPY    7 days before:    Discontinue fiber supplements and medications containing iron. This includes Metamucil  and Fibercon ; and multivitamins with iron.    3 days before:    Begin a low-fiber diet. A low-fiber diet helps making the cleanout more effective.     Examples of a low-fiber diet include (but are not limited to): white bread, white rice, pasta, crackers, fish, chicken, eggs, ground beef, creamy peanut butter, cooked/steamed/boiled vegetables, canned fruit, bananas, melons, milk, plain yogurt cheese, salad dressing and other condiments.     The following are not allowed on a low-fiber diet: seeds, nuts, popcorn, bran, whole wheat, corn, quinoa, raw fruits and vegetables, berries and dried fruit, beans and lentils.    For additional details on low-fiber diet, please refer to the table on the last page.    2 days before:    Continue the low-fiber diet.     Drink at least 8 glasses of water throughout the day.     Stop eating solid foods at 11:45 pm.    1 day before:    In the morning: begin a clear liquid diet (liquids you can see through).     Examples of a clear liquid diet include: water, clear broth or bouillon, Gatorade, Pedialyte or Powerade, carbonated and non-carbonated soft drinks (Sprite , 7-Up , ginger ale), strained fruit juices without pulp (apple, white grape, white cranberry), Jell-O  and popsicles.     The following are not allowed on a clear liquid diet: red liquids, alcoholic beverages, dairy products (milk, creamer, and yogurt), protein shakes, creamy broths, juice with pulp and chewing tobacco.    At noon: take 2 (two) bisacodyl tablets     At 4 (and no later than 6pm): start drinking the  Miralax-Gatorade preparation (8.3 oz of Miralax mixed with 64 oz of Gatorade in a large pitcher). Drink 1(one) 8 oz glass every 15 minutes thereafter, until the mixture is gone.    COLON CLEANSING TIPS: drink adequate amounts of fluids before and after your colon cleansing to prevent dehydration. Stay near a toilet because you will have diarrhea. Even if you are sitting on the toilet, continue to drink the cleansing solution every 15 minutes. If you feel nauseous or vomit, rinse your mouth with water, take a 15 to 30-minute-break and then continue drinking the solution. You will be uncomfortable until the stool has flushed from your colon (in about 2 to 4 hours). You may feel chilled.    Day of your procedure  You may take all of your morning medications including blood pressure medications, blood thinners (if you have not been instructed to stop these by our office), methadone, anti-seizure medications with sips of water 3 hours prior to your procedure or earlier. Do not take insulin or vitamins prior to your procedure. Continue the clear liquid diet.       4 hours prior: drink 10 oz of magnesium citrate. It may be easier to drink it with a straw.    STOP consuming all liquids after that.     Do not take anything by mouth during this time.     Allow extra time to travel to your procedure as you may need to stop and use a restroom along the way.    You are ready for the procedure, if you followed all instructions and your stool is no longer formed, but clear or yellow liquid. If you are unsure whether your colon is clean, please call our office at [phone] before you leave for your appointment.    Bring the following to your procedure:  - Insurance Card/Photo ID.   - List of current medications including over-the-counter medications and supplements.   - Your rescue inhaler if you currently use one to control asthma.    Canceling or rescheduling your appointment:   If you must cancel or reschedule your  appointment, please call [phone] as soon as possible.      COLONOSCOPY PRE-PROCEDURE CHECKLIST    If you have diabetes, ask your regular doctor for diet and medication restrictions.  If you take an anticoagulant or anti-platelet medication (such as Coumadin , Lovenox , Pradaxa , Xarelto , Eliquis , etc.), please call your primary doctor for advice on holding this medication.  If you take aspirin you may continue to do so.  If you are or may be pregnant, please discuss the risks and benefits of this procedure with your doctor.        What happens during a colonoscopy?    Plan to spend up to two hours, starting at registration time, at the endoscopy center the day of your procedure. The colonoscopy takes an average of 15 to 30 minutes. Recovery time is about 30 minutes.      Before the exam:    You will change into a gown.    Your medical history and medication list will be reviewed with you, unless that has been done over the phone prior to the procedure.     A nurse will insert an intravenous (IV) line into your hand or arm.    The doctor will meet with you and will give you a consent form to sign.  During the exam:     Medicine will be given through the IV line to help you relax.     Your heart rate and oxygen levels will be monitored. If your blood pressure is low, you may be given fluids through the IV line.     The doctor will insert a flexible hollow tube, called a colonoscope, into your rectum. The scope will be advanced slowly through the large intestine (colon).    You may have a feeling of fullness or pressure.     If an abnormal tissue or a polyp is found, the doctor may remove it through the endoscope for closer examination, or biopsy. Tissue removal is painless    After the exam:           Any tissue samples removed during the exam will be sent to a lab for evaluation. It may take 5-7 working days for you to be notified of the results.     A nurse will provide you with complete discharge  instructions before you leave the endoscopy center. Be sure to ask the nurse for specific instructions if you take blood thinners such as Aspirin, Coumadin or Plavix.     The doctor will prepare a full report for you and for the physician who referred you for the procedure.     Your doctor will talk with you about the initial results of your exam.      Medication given during the exam will prohibit you from driving for the rest of the day.     Following the exam, you may resume your normal diet. Your first meal should be light, no greasy foods. Avoid alcohol until the next day.     You may resume your regular activities the day after the procedure.         LOW-FIBER DIET    Foods RECOMMENDED Foods to AVOID   Breads, Cereal, Rice and Pasta:   White bread, rolls, biscuits, croissant and maxx toast.   Waffles, Bengali toast and pancakes.   White rice, noodles, pasta, macaroni and peeled cooked potatoes.   Plain crackers and saltines.   Cooked cereals: farina, cream of rice.   Cold cereals: Puffed Rice , Rice Krispies , Corn Flakes  and Special K    Breads, Cereal, Rice and Pasta:   Breads or rolls with nuts, seeds or fruit.   Whole wheat, pumpernickel, rye breads and cornbread.   Potatoes with skin, brown or wild rice, and kasha (buckwheat).     Vegetables:   Tender cooked and canned vegetables without seeds: carrots, asparagus tips, green or wax beans, pumpkin, spinach, lima beans. Vegetables:   Raw or steamed vegetables.   Vegetables with seeds.   Sauerkraut.   Winter squash, peas, broccoli, Brussel sprouts, cabbage, onions, cauliflower, baked beans, peas and corn.   Fruits:   Strained fruit juice.   Canned fruit, except pineapple.   Ripe bananas and melon. Fruits:   Prunes and prune juice.   Raw fruits.   Dried fruits: figs, dates and raisins.   Milk/Dairy:   Milk: plain or flavored.   Yogurt, custard and ice cream.   Cheese and cottage cheese Milk/Dairy:     Meat and other proteins:   ground, well-cooked tender  beef, lamb, ham, veal, pork, fish, poultry and organ meats.   Eggs.   Peanut butter without nuts. Meat and other proteins:   Tough, fibrous meats with gristle.   Dry beans, peas and lentils.   Peanut butter with nuts.   Tofu.   Fats, Snack, Sweets, Condiments and Beverages:   Margarine, butter, oils, mayonnaise, sour cream and salad dressing, plain gravy.   Sugar, hard candy, clear jelly, honey and syrup.   Spices, cooked herbs, bouillon, broth and soups made with allowed vegetable, ketchup and mustard.   Coffee, tea and carbonated drinks.   Plain cakes, cookies and pretzels.   Gelatin, plain puddings, custard, ice cream, sherbet and popsicles. Fats, Snack, Sweets, Condiments and Beverages:   Nuts, seeds and coconut.   Jam, marmalade and preserves.   Pickles, olives, relish and horseradish.   All desserts containing nuts, seeds, dried fruit and coconut; or made from whole grains or bran.   Candy made with nuts or seeds.   Popcorn.         DIRECTIONS TO THE ENDOSCOPY DEPARTMENT    From the north ([facility])  Take [address] South, exit on [address] [address]. Get into the left hand irwin, turn left (east), go one-half mile to [address] and turn left. Go north to the second stoplight, take a right on [address] and follow it to the Main Hospital entrance.    From the south ([facility])  Take 35N to the [address]E split and exit on [address] [address]. On [address] [address], turn left (west) to [address]. Turn right (north) on [address]. Go north to the second stoplight, take a right on [address] and follow it to the Main Hospital entrance.    From the east via 35E ([facility])  Take [address] south to [address] [address] exit. Turn right on [address] [address]. Go west to [address]. Turn right (north) on [address]. Go to the second stoplight, take a right on [address] to the Main Hospital entrance.    From the east via [address] ([facility])  Take [facility] [address] to [address]. Turn left (south) on [address] to [address], turn left (east) on [address] and follow it to the Main Hospital entrance.    From the west via [address] ([place])  Take [address] east to [address]. Turn right (south) on [address] to [address], turn left (east) on [address] and follow it to the Main Hospital entrance.

## 2020-11-19 NOTE — H&P
Pre-Endoscopy History and Physical     Leticia Aquino MRN# [id number]   YOB: 1970 Age: 50 year old     Date of Procedure: 2020  Primary care provider: [facility]  Type of Endoscopy: Colonoscopy with possible biopsy, possible polypectomy  Reason for Procedure: screen  Type of Anesthesia Anticipated: Conscious Sedation    HPI:    Leticia is a 50 year old female who will be undergoing the above procedure.      A history and physical has been performed. The patient's medications and allergies have been reviewed. The risks and benefits of the procedure and the sedation options and risks were discussed with the patient.  All questions were answered and informed consent was obtained.      She denies a personal or family history of anesthesia complications or bleeding disorders.     Patient Active Problem List   Diagnosis     Premenstrual tension syndrome     CARDIOVASCULAR SCREENING; LDL GOAL LESS THAN 160     Pain in joint, ankle and foot     Hypothyroidism due to acquired atrophy of thyroid     Hyperlipidemia LDL goal <130        Past Medical History:   Diagnosis Date     Allergic rhinitis, cause unspecified      Dysmenorrhea 2006     Excessive or frequent menstruation 2006    Sees OB-GYN; on OCP Quarterly cycles. Considering Ablation      Iron deficiency anemia secondary to inadequate dietary iron intake 2004     Premenstrual tension syndromes 2004    Prozac helpful     Unspecified hypothyroidism 3/2004    started low dose replacement; thyroid shad very high        Past Surgical History:   Procedure Laterality Date     GYN SURGERY  11    ablation     HYSTERECTOMY SUPRACERVICAL  2012     [facility] NONSPECIFIC PROCEDURE  1973    Right Inguinal Hernia Repair     [facility] NONSPECIFIC PROCEDURE      D&C for Spontaneous        Social History     Tobacco Use     Smoking status: Former Smoker     Smokeless tobacco: Never Used     Tobacco comment: QUIT    Substance  "Use Topics     Alcohol use: Yes     Comment: 2-3 per week        Family History   Problem Relation Age of Onset     Diabetes Father         Insulin Dependent     Cardiovascular Father         Triple by-pass in [id number]     Thyroid Disease Mother         Hypothyroid     Hypertension Mother      Hyperlipidemia Mother      Cerebrovascular Disease Mother 70        TIA - former smoker     Cancer Maternal Grandfather      Thyroid Disease Daughter         Dx at age 10     Breast Cancer Half-Sister 45     Colon Cancer No family hx of      Osteoporosis No family hx of        Prior to Admission medications    Medication Sig Start Date End Date Taking? Authorizing Provider   acyclovir (ZOVIRAX) 200 MG capsule Take 1 capsule (200 mg) by mouth 5 times daily 10/28/20   Jainna Medina PA-C   atorvastatin (LIPITOR) 40 MG tablet TAKE 1 TABLET DAILY 11/12/20   Janina Medina PA-C   FLUoxetine (PROZAC) 20 MG capsule Take 2 capsules (40 mg) by mouth daily 5/8/20   Janina Medina PA-C   IBUPROFEN 600 MG OR TABS take 1 every 8 hrs if needed  1/10/07   Shabana Mancini MD   SYNTHROID 100 MCG tablet TAKE 1 TABLET DAILY 7/24/20   Liam Wood DO   triamcinolone (KENALOG) 0.1 % external ointment Apply topically 2 times daily as needed for irritation (pruritis) 9/29/20   Cait Roberts APRN CNP       Allergies   Allergen Reactions     Augmentin GI Disturbance     Codeine      gi     Pseudoephedrine Hcl      restlessness        REVIEW OF SYSTEMS:   5 point ROS negative except as noted above in HPI, including Gen., Resp., CV, GI &  system review.    PHYSICAL EXAM:   [facility] 09/13/2012  Estimated body mass index is 29.26 kg/m  as calculated from the following:    Height as of 10/28/20: 1.575 m (5' 2\").    Weight as of 10/28/20: 72.6 kg (160 lb).   GENERAL APPEARANCE: alert, and oriented  MENTAL STATUS: alert  AIRWAY EXAM: Mallampatti Class I (visualization of the soft palate, fauces, uvula, " anterior and posterior pillars)  RESP: lungs clear to auscultation - no rales, rhonchi or wheezes  CV: regular rates and rhythm  DIAGNOSTICS:    Not indicated    IMPRESSION   ASA Class 1 - Healthy patient, no medical problems    PLAN:   Plan for Colonoscopy with possible biopsy, possible polypectomy. We discussed the risks, benefits and alternatives and the patient wished to proceed.    The above has been forwarded to the consulting provider.      Signed Electronically by: Mateo Daniels MD  November 19, 2020

## 2020-11-20 LAB — COPATH REPORT: NORMAL

## 2021-02-28 DIAGNOSIS — N94.3 PREMENSTRUAL TENSION SYNDROME: ICD-10-CM

## 2021-05-09 ENCOUNTER — HEALTH MAINTENANCE LETTER (OUTPATIENT)
Age: 51
End: 2021-05-09

## 2021-09-21 ENCOUNTER — TELEPHONE (OUTPATIENT)
Dept: FAMILY MEDICINE | Facility: CLINIC | Age: 51
End: 2021-09-21

## 2021-09-21 DIAGNOSIS — N94.3 PREMENSTRUAL TENSION SYNDROME: ICD-10-CM

## 2021-09-22 NOTE — TELEPHONE ENCOUNTER
Pt due for physical after 10/28. Please call and assist with scheduling.  Electronically Signed By: Janina Medina PA-C

## 2021-09-22 NOTE — TELEPHONE ENCOUNTER
Left non-detailed message for patient to call back.  Please schedule physical after 10/28 when patient calls back.  (see previous notes for details)    Thanks Nelda

## 2021-10-24 ENCOUNTER — HEALTH MAINTENANCE LETTER (OUTPATIENT)
Age: 51
End: 2021-10-24

## 2021-10-31 DIAGNOSIS — E78.5 HYPERLIPIDEMIA LDL GOAL <130: ICD-10-CM

## 2021-11-02 RX ORDER — ATORVASTATIN CALCIUM 40 MG/1
TABLET, FILM COATED ORAL
Qty: 90 TABLET | Refills: 0 | Status: SHIPPED | OUTPATIENT
Start: 2021-11-02 | End: 2021-11-26

## 2021-11-02 NOTE — TELEPHONE ENCOUNTER
Medication is being filled for 1 time refill only due to:  Patient needs labs .    Pt has appt scheduled 11/12/21  Genesis Brown RN, BSN

## 2021-11-26 ENCOUNTER — OFFICE VISIT (OUTPATIENT)
Dept: FAMILY MEDICINE | Facility: CLINIC | Age: 51
End: 2021-11-26
Payer: COMMERCIAL

## 2021-11-26 ENCOUNTER — PATIENT OUTREACH (OUTPATIENT)
Dept: ONCOLOGY | Facility: CLINIC | Age: 51
End: 2021-11-26

## 2021-11-26 VITALS
TEMPERATURE: 96.5 F | DIASTOLIC BLOOD PRESSURE: 84 MMHG | HEART RATE: 70 BPM | OXYGEN SATURATION: 99 % | WEIGHT: 169.6 LBS | SYSTOLIC BLOOD PRESSURE: 122 MMHG | BODY MASS INDEX: 31.21 KG/M2 | HEIGHT: 62 IN

## 2021-11-26 DIAGNOSIS — Z23 HIGH PRIORITY FOR 2019-NCOV VACCINE: ICD-10-CM

## 2021-11-26 DIAGNOSIS — Z11.59 NEED FOR HEPATITIS C SCREENING TEST: ICD-10-CM

## 2021-11-26 DIAGNOSIS — Z80.3 FAMILY HISTORY OF BREAST CANCER IN FEMALE: ICD-10-CM

## 2021-11-26 DIAGNOSIS — Z00.00 ROUTINE GENERAL MEDICAL EXAMINATION AT A HEALTH CARE FACILITY: Primary | ICD-10-CM

## 2021-11-26 DIAGNOSIS — E03.4 HYPOTHYROIDISM DUE TO ACQUIRED ATROPHY OF THYROID: ICD-10-CM

## 2021-11-26 DIAGNOSIS — B00.1 RECURRENT COLD SORES: ICD-10-CM

## 2021-11-26 DIAGNOSIS — E78.5 HYPERLIPIDEMIA LDL GOAL <130: ICD-10-CM

## 2021-11-26 DIAGNOSIS — B07.9 VIRAL WARTS, UNSPECIFIED TYPE: ICD-10-CM

## 2021-11-26 DIAGNOSIS — N94.3 PREMENSTRUAL TENSION SYNDROME: ICD-10-CM

## 2021-11-26 DIAGNOSIS — Z12.31 ENCOUNTER FOR SCREENING MAMMOGRAM FOR BREAST CANCER: ICD-10-CM

## 2021-11-26 DIAGNOSIS — Z23 NEED FOR INFLUENZA VACCINATION: ICD-10-CM

## 2021-11-26 DIAGNOSIS — Z13.1 SCREENING FOR DIABETES MELLITUS: ICD-10-CM

## 2021-11-26 LAB
ALBUMIN SERPL-MCNC: 3.7 G/DL (ref 3.4–5)
ALP SERPL-CCNC: 51 U/L (ref 40–150)
ALT SERPL W P-5'-P-CCNC: 33 U/L (ref 0–50)
ANION GAP SERPL CALCULATED.3IONS-SCNC: 6 MMOL/L (ref 3–14)
AST SERPL W P-5'-P-CCNC: 27 U/L (ref 0–45)
BILIRUB SERPL-MCNC: 0.4 MG/DL (ref 0.2–1.3)
BUN SERPL-MCNC: 16 MG/DL (ref 7–30)
CALCIUM SERPL-MCNC: 8.8 MG/DL (ref 8.5–10.1)
CHLORIDE BLD-SCNC: 109 MMOL/L (ref 94–109)
CHOLEST SERPL-MCNC: 193 MG/DL
CO2 SERPL-SCNC: 25 MMOL/L (ref 20–32)
CREAT SERPL-MCNC: 0.69 MG/DL (ref 0.52–1.04)
FASTING STATUS PATIENT QL REPORTED: YES
GFR SERPL CREATININE-BSD FRML MDRD: >90 ML/MIN/1.73M2
GLUCOSE BLD-MCNC: 89 MG/DL (ref 70–99)
HDLC SERPL-MCNC: 96 MG/DL
LDLC SERPL CALC-MCNC: 93 MG/DL
NONHDLC SERPL-MCNC: 97 MG/DL
POTASSIUM BLD-SCNC: 4.2 MMOL/L (ref 3.4–5.3)
PROT SERPL-MCNC: 7.2 G/DL (ref 6.8–8.8)
SODIUM SERPL-SCNC: 140 MMOL/L (ref 133–144)
TRIGL SERPL-MCNC: 21 MG/DL
TSH SERPL DL<=0.005 MIU/L-ACNC: 1.19 MU/L (ref 0.4–4)

## 2021-11-26 PROCEDURE — 80053 COMPREHEN METABOLIC PANEL: CPT | Performed by: PHYSICIAN ASSISTANT

## 2021-11-26 PROCEDURE — 91300 COVID-19,PF,PFIZER (12+ YRS): CPT | Performed by: PHYSICIAN ASSISTANT

## 2021-11-26 PROCEDURE — 90682 RIV4 VACC RECOMBINANT DNA IM: CPT | Performed by: PHYSICIAN ASSISTANT

## 2021-11-26 PROCEDURE — 36415 COLL VENOUS BLD VENIPUNCTURE: CPT | Performed by: PHYSICIAN ASSISTANT

## 2021-11-26 PROCEDURE — 99214 OFFICE O/P EST MOD 30 MIN: CPT | Mod: 25 | Performed by: PHYSICIAN ASSISTANT

## 2021-11-26 PROCEDURE — 84443 ASSAY THYROID STIM HORMONE: CPT | Performed by: PHYSICIAN ASSISTANT

## 2021-11-26 PROCEDURE — 99396 PREV VISIT EST AGE 40-64: CPT | Mod: 25 | Performed by: PHYSICIAN ASSISTANT

## 2021-11-26 PROCEDURE — 80061 LIPID PANEL: CPT | Performed by: PHYSICIAN ASSISTANT

## 2021-11-26 PROCEDURE — 0004A COVID-19,PF,PFIZER (12+ YRS): CPT | Performed by: PHYSICIAN ASSISTANT

## 2021-11-26 PROCEDURE — 90471 IMMUNIZATION ADMIN: CPT | Performed by: PHYSICIAN ASSISTANT

## 2021-11-26 PROCEDURE — 17110 DESTRUCTION B9 LES UP TO 14: CPT | Performed by: PHYSICIAN ASSISTANT

## 2021-11-26 RX ORDER — LEVOTHYROXINE SODIUM 100 UG/1
100 TABLET ORAL DAILY
Qty: 90 TABLET | Refills: 3 | Status: SHIPPED | OUTPATIENT
Start: 2021-11-26 | End: 2022-06-03

## 2021-11-26 RX ORDER — ACYCLOVIR 200 MG/1
200 CAPSULE ORAL
Qty: 25 CAPSULE | Refills: 1 | Status: SHIPPED | OUTPATIENT
Start: 2021-11-26 | End: 2023-01-18

## 2021-11-26 RX ORDER — FLUOXETINE 40 MG/1
40 CAPSULE ORAL DAILY
Qty: 90 CAPSULE | Refills: 3 | Status: SHIPPED | OUTPATIENT
Start: 2021-11-26 | End: 2022-06-03

## 2021-11-26 RX ORDER — ATORVASTATIN CALCIUM 40 MG/1
40 TABLET, FILM COATED ORAL DAILY
Qty: 90 TABLET | Refills: 3 | Status: SHIPPED | OUTPATIENT
Start: 2021-11-26 | End: 2022-06-03

## 2021-11-26 ASSESSMENT — ENCOUNTER SYMPTOMS
CHILLS: 0
PARESTHESIAS: 0
NAUSEA: 0
DYSURIA: 0
HEMATURIA: 0
MYALGIAS: 0
FEVER: 0
ARTHRALGIAS: 1
CONSTIPATION: 0
BREAST MASS: 0
HEARTBURN: 0
COUGH: 0
DIARRHEA: 0
EYE PAIN: 0
SHORTNESS OF BREATH: 0
DIZZINESS: 0
JOINT SWELLING: 0
ABDOMINAL PAIN: 0
WEAKNESS: 0
HEMATOCHEZIA: 0
SORE THROAT: 0
FREQUENCY: 0
PALPITATIONS: 0
HEADACHES: 0
NERVOUS/ANXIOUS: 0

## 2021-11-26 ASSESSMENT — MIFFLIN-ST. JEOR: SCORE: 1337.55

## 2021-11-26 NOTE — PROGRESS NOTES
SUBJECTIVE:   CC: Leticia Aquino is an 51 year old woman who presents for preventive health visit.       Patient has been advised of split billing requirements and indicates understanding: Yes  Healthy Habits:     Getting at least 3 servings of Calcium per day:  Yes    Bi-annual eye exam:  Yes    Dental care twice a year:  Yes    Sleep apnea or symptoms of sleep apnea:  None    Diet:  Carbohydrate counting    Frequency of exercise:  4-5 days/week    Duration of exercise:  30-45 minutes    Taking medications regularly:  Yes    Medication side effects:  None    PHQ-2 Total Score: 0    Additional concerns today:  Yes    Discuss Wart- index finger on right hand. Onset 4-5 months. Tried OTC compound W, but was not very diligent with this. Would like to be frozen.     Discuss COVID Booster, flu and Shingles vaccine - did not check her insurance on shingles.    Has been very stable on fluoxetine 40mg daily. Hx of premenstrual mood disorder and stayed on for perimenopausal benefit. Hx of hysterectomy in 2012, still has cervix and continues pap screening.    Hx of tattoos, but declines hep C screening    Acyclovir prn for cold sores. Estimates will get about 3 outbreaks per year.    Hyperlipidemia Follow-Up; had been originally in the 190-220's range despite exercise/healthy habits. Increased lipitor from 20-40 and was able to drop LDL down to the 130's range. Exercises on pelaton and has been doing different work outs with this.      Are you regularly taking any medication or supplement to lower your cholesterol?   Yes- Atorvastatin     Are you having muscle aches or other side effects that you think could be caused by your cholesterol lowering medication?  No    Hypothyroidism Follow-up; stable on 100mcg levothyroxine.    Since last visit, patient describes the following symptoms: Weight stable, no hair loss, no skin changes, no constipation, no loose stools      Today's PHQ-2 Score:   PHQ-2 ( 1999 Pfizer)  2021   Q1: Little interest or pleasure in doing things 0   Q2: Feeling down, depressed or hopeless 0   PHQ-2 Score 0   PHQ-2 Total Score (12-17 Years)- Positive if 3 or more points; Administer PHQ-A if positive -   Q1: Little interest or pleasure in doing things Not at all   Q2: Feeling down, depressed or hopeless Not at all   PHQ-2 Score 0       Abuse: Current or Past (Physical, Sexual or Emotional) - No  Do you feel safe in your environment? Yes    Have you ever done Advance Care Planning? (For example, a Health Directive, POLST, or a discussion with a medical provider or your loved ones about your wishes): Yes, patient states has an Advance Care Planning document and will bring a copy to the clinic.    Social History     Tobacco Use     Smoking status: Former Smoker     Packs/day: 0.50     Years: 5.00     Pack years: 2.50     Types: Cigarettes     Start date: 1985     Quit date: 1990     Years since quittin.9     Smokeless tobacco: Never Used     Tobacco comment: QUIT    Substance Use Topics     Alcohol use: Yes     Comment: 2-3 times per week     If you drink alcohol do you typically have >3 drinks per day or >7 drinks per week? No    Alcohol Use 2021   Prescreen: >3 drinks/day or >7 drinks/week? Yes   Prescreen: >3 drinks/day or >7 drinks/week? -   AUDIT SCORE  8       Reviewed orders with patient.  Reviewed health maintenance and updated orders accordingly - Yes  BP Readings from Last 3 Encounters:   21 122/84   20 117/86   11/10/20 118/86    Wt Readings from Last 3 Encounters:   21 76.9 kg (169 lb 9.6 oz)   20 72.6 kg (160 lb)   10/28/20 72.6 kg (160 lb)                  Patient Active Problem List   Diagnosis     Premenstrual tension syndrome     CARDIOVASCULAR SCREENING; LDL GOAL LESS THAN 160     Pain in joint, ankle and foot     Hypothyroidism due to acquired atrophy of thyroid     Hyperlipidemia LDL goal <130     Family history of breast cancer in  female - maternal half sister age 50     Recurrent cold sores     Past Surgical History:   Procedure Laterality Date     GYN SURGERY  2011    ablation     HERNIA REPAIR  1975     HYSTERECTOMY SUPRACERVICAL  2012    partial     ZZC NONSPECIFIC PROCEDURE  1973    Right Inguinal Hernia Repair     ZZC NONSPECIFIC PROCEDURE  1995    D&C for Spontaneous        Social History     Tobacco Use     Smoking status: Former Smoker     Packs/day: 0.50     Years: 5.00     Pack years: 2.50     Types: Cigarettes     Start date: 1985     Quit date: 1990     Years since quittin.9     Smokeless tobacco: Never Used     Tobacco comment: QUIT    Substance Use Topics     Alcohol use: Yes     Comment: 2-3 times per week     Family History   Problem Relation Age of Onset     Thyroid Disease Mother         Hypothyroid     Hypertension Mother      Hyperlipidemia Mother      Cerebrovascular Disease Mother 70        TIA - former smoker     Diabetes Father         Insulin Dependent     Cardiovascular Father         Triple by-pass in [phone]     Coronary Artery Disease Father      Cancer Maternal Grandfather      Thyroid Disease Daughter         Dx at age 10     Thyroid Disease Son      Colon Cancer Maternal Half-Brother      Breast Cancer Maternal Half-Sister 50     Colon Cancer No family hx of      Osteoporosis No family hx of          Current Outpatient Medications   Medication Sig Dispense Refill     acyclovir (ZOVIRAX) 200 MG capsule Take 1 capsule (200 mg) by mouth 5 times daily 25 capsule 1     atorvastatin (LIPITOR) 40 MG tablet TAKE 1 TABLET DAILY 90 tablet 0     IBUPROFEN 600 MG OR TABS take 1 every 8 hrs if needed  60 prn     SYNTHROID 100 MCG tablet TAKE 1 TABLET DAILY 90 tablet 0     Allergies   Allergen Reactions     Augmentin GI Disturbance     Codeine      gi     Pseudoephedrine Hcl      restlessness       Breast Cancer Screening:    FHS-7:   Breast CA Risk Assessment (FHS-7)  11/5/2021 11/5/2021 11/26/2021   Did any of your first-degree relatives have breast or ovarian cancer? Yes Yes Yes   Did any of your relatives have bilateral breast cancer? No No No   Did any man in your family have breast cancer? No No No   Did any woman in your family have breast and ovarian cancer? No Yes Yes   Did any woman in your family have breast cancer before age 50 y? Yes Yes Yes   Do you have 2 or more relatives with breast and/or ovarian cancer? No No No   Do you have 2 or more relatives with breast and/or bowel cancer? No No No       Mammogram Screening: Recommended annual mammography  Pertinent mammograms are reviewed under the imaging tab.    History of abnormal Pap smear: NO - age 30-65 PAP every 5 years with negative HPV co-testing recommended  PAP / HPV Latest Ref Rng & Units 3/15/2019 2/28/2014 5/3/2006   PAP (Historical) - NIL NIL NIL   HPV16 NEG:Negative Negative - -   HPV18 NEG:Negative Negative - -   HRHPV NEG:Negative Negative - -     Reviewed and updated as needed this visit by clinical staff  Tobacco  Allergies  Meds  Problems  Med Hx  Surg Hx  Fam Hx  Soc Hx         Reviewed and updated as needed this visit by Provider  Tobacco  Allergies  Meds  Problems  Med Hx  Surg Hx  Fam Hx  Soc Hx          Review of Systems   Constitutional: Negative for chills and fever.   HENT: Negative for congestion, ear pain, hearing loss and sore throat.    Eyes: Negative for pain and visual disturbance.   Respiratory: Negative for cough and shortness of breath.    Cardiovascular: Negative for chest pain, palpitations and peripheral edema.   Gastrointestinal: Negative for abdominal pain, constipation, diarrhea, heartburn, hematochezia and nausea.   Breasts:  Negative for tenderness, breast mass and discharge.   Genitourinary: Negative for dysuria, frequency, genital sores, hematuria, pelvic pain, urgency, vaginal bleeding and vaginal discharge.   Musculoskeletal: Positive for arthralgias. Negative  "for joint swelling and myalgias.        Has noticed that knees crunch and can hear audible crepitus - no pain with this. Only will only have issue if she over-does it.    Skin: Negative for rash.   Neurological: Negative for dizziness, weakness, headaches and paresthesias.   Psychiatric/Behavioral: Negative for mood changes. The patient is not nervous/anxious.         OBJECTIVE:   /84 (BP Location: Left arm)   Pulse 70   Temp (!) 96.5  F (35.8  C) (Tympanic)   Ht 1.575 m (5' 2\")   Wt 76.9 kg (169 lb 9.6 oz)   LMP 09/13/2012   SpO2 99%   BMI 31.02 kg/m    Physical Exam  GENERAL: healthy, alert and no distress  EYES: Eyes grossly normal to inspection, PERRL and conjunctivae and sclerae normal  HENT: ear canals and TM's normal, nose and mouth without ulcers or lesions  NECK: no adenopathy, no asymmetry, masses, or scars and thyroid normal to palpation  RESP: lungs clear to auscultation - no rales, rhonchi or wheezes  BREAST: normal without masses, tenderness or nipple discharge and no palpable axillary masses or adenopathy  CV: regular rate and rhythm, normal S1 S2, no S3 or S4, no murmur, click or rub, no peripheral edema and peripheral pulses strong  ABDOMEN: soft, nontender, no hepatosplenomegaly, no masses and bowel sounds normal  MS: no gross musculoskeletal defects noted, no edema  SKIN: small art noted along medial aspect of R index finger just adjacent to nail. no suspicious lesions or rashes  NEURO: Normal strength and tone, mentation intact and speech normal  PSYCH: mentation appears normal, affect normal/bright    Diagnostic Test Results:  Labs reviewed in Epic    ASSESSMENT/PLAN:   Leticia was seen today for physical and imm/inj.    Diagnoses and all orders for this visit:    Routine general medical examination at a health care facility  -     Reviewed preventative health recommendations for age.  REVIEW OF HEALTH MAINTENANCE PROTOCOL ORDERS    Hypothyroidism due to acquired atrophy of " thyroid  -     Feeling euthyroid. Continue levothyroxine 100mcg without changes and can adjust if needed pending labs.  - TSH WITH FREE T4 REFLEX; Future  -     levothyroxine (SYNTHROID) 100 MCG tablet; Take 1 tablet (100 mcg) by mouth daily  -     TSH WITH FREE T4 REFLEX    Premenstrual tension syndrome  -     Very stable originally started for premenstrual mood changes and has continued through perimenopause. will continue without changes.  - FLUoxetine (PROZAC) 40 MG capsule; Take 1 capsule (40 mg) by mouth daily    Hyperlipidemia LDL goal <130  -     Improved from LDL in the 200's range down to 130's after high intensity statin. Recheck labs and continue without changes. No other risk factors including DM, HTN, CVD, smoking. If develops any could consider switch to crestor to see if any further LDL lowering benefit.   - COMPREHENSIVE METABOLIC PANEL; Future  -     Lipid panel reflex to direct LDL Fasting; Future  -     atorvastatin (LIPITOR) 40 MG tablet; Take 1 tablet (40 mg) by mouth daily  -     COMPREHENSIVE METABOLIC PANEL  -     Lipid panel reflex to direct LDL Fasting    Recurrent cold sores  -     Stable with prn use of antiviral. Continue without changes.  acyclovir (ZOVIRAX) 200 MG capsule; Take 1 capsule (200 mg) by mouth 5 times daily    Family history of breast cancer in female - maternal half sister age 50  -     Discussed consult with genetics and pt wishes to pursue.  - Cancer Risk Mgmt/Cancer Genetic Counseling Referral; Future    Viral warts, unspecified type  -     Pt requesting cryotherapy so this was performed today. Risks/benefits reviewed. Can re-treat in 3 weeks if needed.  - DESTRUCT BENIGN LESION, UP TO 14    Screening for diabetes mellitus  -     COMPREHENSIVE METABOLIC PANEL; Future  -     COMPREHENSIVE METABOLIC PANEL    Need for influenza vaccination  -     INFLUENZA QUAD, RECOMBINANT, P-FREE (RIV4) (FLUBLOK)    High priority for 2019-nCoV vaccine  -     COVID-19,PF,PFIZER (12+ Yrs  "PURPLE LABEL)    Need for hepatitis C screening test   - Has tattoo, but declines screening. Will let me know if changes her mind.    Encounter for screening mammogram for breast cancer  -     MA Screen Bilateral w/Victor M; Future        Patient has been advised of split billing requirements and indicates understanding: No  COUNSELING:  Reviewed preventive health counseling, as reflected in patient instructions       Regular exercise       Healthy diet/nutrition       Immunizations    Vaccinated for: Influenza             Colon cancer screening       Consider Hep C screening for all patients one time for ages 18-79 years       (Teresa)menopause management    Estimated body mass index is 31.02 kg/m  as calculated from the following:    Height as of this encounter: 1.575 m (5' 2\").    Weight as of this encounter: 76.9 kg (169 lb 9.6 oz).    Weight management plan: Discussed healthy diet and exercise guidelines    She reports that she quit smoking about 31 years ago. Her smoking use included cigarettes. She started smoking about 36 years ago. She has a 2.50 pack-year smoking history. She has never used smokeless tobacco.      Counseling Resources:  ATP IV Guidelines  Pooled Cohorts Equation Calculator  Breast Cancer Risk Calculator  BRCA-Related Cancer Risk Assessment: FHS-7 Tool  FRAX Risk Assessment  ICSI Preventive Guidelines  Dietary Guidelines for Americans, 2010  USDA's MyPlate  ASA Prophylaxis  Lung CA Screening    Janina Medina PA-C  [facility]  "

## 2021-11-30 NOTE — RESULT ENCOUNTER NOTE
Dear Leticia,      Your recent test results are noted below:    -Cholesterol levels are at your goal levels.  ADVISE: continuing your medication, a regular exercise program with at least 150 minutes of aerobic exercise per week, and eating a low saturated fat/low carbohydrate diet.  Also, you should recheck this fasting cholesterol panel in 12 months.  -Liver and gallbladder tests are normal (ALT,AST, Alk phos, bilirubin), kidney function is normal (Cr, GFR), sodium is normal, potassium is normal, calcium is normal, glucose is normal.  -TSH (thyroid stimulating hormone) level is normal which indicates appropriate thyroid replacement dosing.  ADVISE: continuing same replacement dose and rechecking this in 12 months.    For additional lab test information, labtestsonline.org is an excellent reference. Please contact the clinic at [phone] with any further questions or concerns.    Sincerely,      Janina Medina PA-C  [facility]

## 2021-12-01 ENCOUNTER — ANCILLARY PROCEDURE (OUTPATIENT)
Dept: MAMMOGRAPHY | Facility: CLINIC | Age: 51
End: 2021-12-01
Attending: PHYSICIAN ASSISTANT
Payer: COMMERCIAL

## 2021-12-01 DIAGNOSIS — Z12.31 ENCOUNTER FOR SCREENING MAMMOGRAM FOR BREAST CANCER: ICD-10-CM

## 2021-12-01 PROCEDURE — 77063 BREAST TOMOSYNTHESIS BI: CPT | Mod: TC | Performed by: INTERNAL MEDICINE

## 2021-12-01 PROCEDURE — 77067 SCR MAMMO BI INCL CAD: CPT | Mod: TC | Performed by: INTERNAL MEDICINE

## 2021-12-20 ENCOUNTER — VIRTUAL VISIT (OUTPATIENT)
Dept: FAMILY MEDICINE | Facility: CLINIC | Age: 51
End: 2021-12-20
Payer: COMMERCIAL

## 2021-12-20 DIAGNOSIS — T75.3XXA MOTION SICKNESS, INITIAL ENCOUNTER: Primary | ICD-10-CM

## 2021-12-20 PROCEDURE — 99213 OFFICE O/P EST LOW 20 MIN: CPT | Mod: 95 | Performed by: PHYSICIAN ASSISTANT

## 2021-12-20 RX ORDER — SCOLOPAMINE TRANSDERMAL SYSTEM 1 MG/1
1 PATCH, EXTENDED RELEASE TRANSDERMAL
Qty: 3 PATCH | Refills: 0 | Status: SHIPPED | OUTPATIENT
Start: 2021-12-20 | End: 2022-12-21

## 2021-12-20 NOTE — PROGRESS NOTES
"Leticia is a 51 year old who is being evaluated via a billable video visit.      How would you like to obtain your AVS? MyChart  If the video visit is dropped, the invitation should be resent by: Text to cell phone: [phone]   Will anyone else be joining your video visit? No    Video Start Time: Start: 12/20/2021 09:20 am  Stop: 12/20/2021 09:27 am    Assessment & Plan     Leticia was seen today for medication request.    Diagnoses and all orders for this visit:    Motion sickness, initial encounter  -     scopolamine (TRANSDERM) 1 MG/3DAYS 72 hr patch; Place 1 patch onto the skin every 72 hours    Traveling to [place] and has hx of motion sickness with N/V improved with use of scopolamine patch so will prescribe again per her request. Risks/use reviewed.     Return in about 11 months (around 11/26/2022) for Preventive Visit.    Janina Medina PA-C  [facility]    Subjective   Leticia is a 51 year old who presents for the following health issues     HPI   Traveling on 12/27/20201 - going to [place]. Requesting prescription for a motion sickness patch. Will be going [place] ocean and knows \"this won't go well with me\" - has experienced N/V with it in the past. Issues with even small plane rides in the past. Also will be on a day trip to go whale watching. Has used patch in the past and helped her.     Current Outpatient Medications   Medication     acyclovir (ZOVIRAX) 200 MG capsule     atorvastatin (LIPITOR) 40 MG tablet     FLUoxetine (PROZAC) 40 MG capsule     IBUPROFEN 600 MG OR TABS     levothyroxine (SYNTHROID) 100 MCG tablet     scopolamine (TRANSDERM) 1 MG/3DAYS 72 hr patch     No current facility-administered medications for this visit.        Allergies   Allergen Reactions     Augmentin GI Disturbance     Codeine      gi     Pseudoephedrine Hcl      restlessness     Review of Systems   Constitutional, HEENT, cardiovascular, pulmonary, gi and gu systems are negative, " except as otherwise noted.      Objective           Vitals:  No vitals were obtained today due to virtual visit.    Physical Exam   GENERAL: Healthy, alert and no distress  EYES: Eyes grossly normal to inspection.  No discharge or erythema, or obvious scleral/conjunctival abnormalities.  RESP: No audible wheeze, cough, or visible cyanosis.  No visible retractions or increased work of breathing.    SKIN: Visible skin clear. No significant rash, abnormal pigmentation or lesions.  NEURO: Cranial nerves grossly intact.  Mentation and speech appropriate for age.  PSYCH: Mentation appears normal, affect normal/bright, judgement and insight intact, normal speech and appearance well-groomed.            Video-Visit Details    Type of service:  Video Visit    Video End Time:Start: 12/20/2021 09:20 am  Stop: 12/20/2021 09:27 am    Originating Location (pt. Location): Home    Distant Location (provider location):  [facility]     Platform used for Video Visit: [employer]

## 2022-01-06 DIAGNOSIS — E03.4 HYPOTHYROIDISM DUE TO ACQUIRED ATROPHY OF THYROID: ICD-10-CM

## 2022-01-07 RX ORDER — LEVOTHYROXINE SODIUM 100 UG/1
100 TABLET ORAL DAILY
Qty: 90 TABLET | Refills: 3 | OUTPATIENT
Start: 2022-01-07

## 2022-01-07 NOTE — TELEPHONE ENCOUNTER
Medication refused, filled 11/26/21 for a years supply    Pending Prescriptions:                       Disp   Refills    levothyroxine (SYNTHROID) 100 MCG tablet  90 tab*3            Sig: Take 1 tablet (100 mcg) by mouth daily    .

## 2022-01-31 DIAGNOSIS — E78.5 HYPERLIPIDEMIA LDL GOAL <130: ICD-10-CM

## 2022-02-01 RX ORDER — ATORVASTATIN CALCIUM 40 MG/1
40 TABLET, FILM COATED ORAL DAILY
Qty: 90 TABLET | Refills: 3 | OUTPATIENT
Start: 2022-02-01

## 2022-02-01 NOTE — TELEPHONE ENCOUNTER
Disp Refills Start End KEYON   atorvastatin (LIPITOR) 40 MG tablet 90 tablet 3 11/26/2021  No   Sig - Route: Take 1 tablet (40 mg) by mouth daily - Oral   Sent to pharmacy as: Atorvastatin Calcium 40 MG Oral Tablet (LIPITOR)   Class: E-Prescribe   Order: [id number]   E-Prescribing Status: Receipt confirmed by pharmacy (11/26/2021  9:42 AM CST)

## 2022-06-02 ENCOUNTER — MYC MEDICAL ADVICE (OUTPATIENT)
Dept: FAMILY MEDICINE | Facility: CLINIC | Age: 52
End: 2022-06-02

## 2022-06-02 DIAGNOSIS — E78.5 HYPERLIPIDEMIA LDL GOAL <130: ICD-10-CM

## 2022-06-02 DIAGNOSIS — E03.4 HYPOTHYROIDISM DUE TO ACQUIRED ATROPHY OF THYROID: ICD-10-CM

## 2022-06-03 DIAGNOSIS — E03.4 HYPOTHYROIDISM DUE TO ACQUIRED ATROPHY OF THYROID: ICD-10-CM

## 2022-06-03 DIAGNOSIS — N94.3 PREMENSTRUAL TENSION SYNDROME: ICD-10-CM

## 2022-06-03 DIAGNOSIS — E78.5 HYPERLIPIDEMIA LDL GOAL <130: ICD-10-CM

## 2022-06-03 RX ORDER — LEVOTHYROXINE SODIUM 100 UG/1
100 TABLET ORAL DAILY
Qty: 90 TABLET | Refills: 3 | OUTPATIENT
Start: 2022-06-03

## 2022-06-03 RX ORDER — ATORVASTATIN CALCIUM 40 MG/1
40 TABLET, FILM COATED ORAL DAILY
Qty: 90 TABLET | Refills: 1 | Status: SHIPPED | OUTPATIENT
Start: 2022-06-03 | End: 2023-01-11

## 2022-06-03 RX ORDER — FLUOXETINE 40 MG/1
40 CAPSULE ORAL DAILY
Qty: 90 CAPSULE | Refills: 1 | Status: SHIPPED | OUTPATIENT
Start: 2022-06-03 | End: 2023-01-11

## 2022-06-03 RX ORDER — LEVOTHYROXINE SODIUM 100 UG/1
100 TABLET ORAL DAILY
Qty: 90 TABLET | Refills: 1 | Status: SHIPPED | OUTPATIENT
Start: 2022-06-03 | End: 2022-12-12

## 2022-06-03 RX ORDER — ATORVASTATIN CALCIUM 40 MG/1
40 TABLET, FILM COATED ORAL DAILY
Qty: 90 TABLET | Refills: 3 | OUTPATIENT
Start: 2022-06-03

## 2022-06-03 NOTE — TELEPHONE ENCOUNTER
6 month supply of fluoxetine sent in. Due for physical in November.     Kelli Hudson RN  [facility]

## 2022-06-03 NOTE — TELEPHONE ENCOUNTER
6 month supply sent. Due for physical/labs in November.     Kelli Hudson RN  [facility]

## 2022-10-15 ENCOUNTER — HEALTH MAINTENANCE LETTER (OUTPATIENT)
Age: 52
End: 2022-10-15

## 2022-12-05 DIAGNOSIS — E03.4 HYPOTHYROIDISM DUE TO ACQUIRED ATROPHY OF THYROID: ICD-10-CM

## 2022-12-07 NOTE — TELEPHONE ENCOUNTER
Routing refill request to provider for review/approval because:   Thyroid Protocol Failed 12/05/2022 07:07 AM   Protocol Details  Normal TSH on file in past 12 months      Needs to be seen for fasting PX     Please schedule then route to PCP for refill     Thank you     Vibha Schwab RN, BSN  [facility]

## 2022-12-12 RX ORDER — LEVOTHYROXINE SODIUM 100 MCG
TABLET ORAL
Qty: 90 TABLET | Refills: 0 | Status: SHIPPED | OUTPATIENT
Start: 2022-12-12 | End: 2023-01-18

## 2022-12-12 NOTE — TELEPHONE ENCOUNTER
Script extended given upcoming appt. Please notify.  Electronically Signed By: Janina Medina PA-C

## 2022-12-21 ENCOUNTER — TELEPHONE (OUTPATIENT)
Dept: FAMILY MEDICINE | Facility: CLINIC | Age: 52
End: 2022-12-21

## 2022-12-21 ENCOUNTER — OFFICE VISIT (OUTPATIENT)
Dept: FAMILY MEDICINE | Facility: CLINIC | Age: 52
End: 2022-12-21
Payer: COMMERCIAL

## 2022-12-21 VITALS
OXYGEN SATURATION: 99 % | HEART RATE: 78 BPM | BODY MASS INDEX: 31.47 KG/M2 | SYSTOLIC BLOOD PRESSURE: 120 MMHG | RESPIRATION RATE: 12 BRPM | TEMPERATURE: 98.6 F | HEIGHT: 62 IN | WEIGHT: 171 LBS | DIASTOLIC BLOOD PRESSURE: 84 MMHG

## 2022-12-21 DIAGNOSIS — M77.12 LATERAL EPICONDYLITIS OF BOTH ELBOWS: ICD-10-CM

## 2022-12-21 DIAGNOSIS — M77.12 LATERAL EPICONDYLITIS OF BOTH ELBOWS: Primary | ICD-10-CM

## 2022-12-21 DIAGNOSIS — M77.11 LATERAL EPICONDYLITIS OF BOTH ELBOWS: Primary | ICD-10-CM

## 2022-12-21 DIAGNOSIS — M77.11 LATERAL EPICONDYLITIS OF BOTH ELBOWS: ICD-10-CM

## 2022-12-21 PROCEDURE — 99213 OFFICE O/P EST LOW 20 MIN: CPT | Performed by: NURSE PRACTITIONER

## 2022-12-21 RX ORDER — SENNOSIDES 8.6 MG
650-1300 CAPSULE ORAL EVERY 8 HOURS PRN
Qty: 100 TABLET | Refills: 5 | Status: SHIPPED | OUTPATIENT
Start: 2022-12-21 | End: 2022-12-21

## 2022-12-21 RX ORDER — SENNOSIDES 8.6 MG
650-1300 CAPSULE ORAL EVERY 8 HOURS PRN
Qty: 100 TABLET | Refills: 5 | Status: SHIPPED | OUTPATIENT
Start: 2022-12-21 | End: 2023-12-27

## 2022-12-21 RX ORDER — METHYLPREDNISOLONE 4 MG
TABLET, DOSE PACK ORAL
Qty: 21 TABLET | Refills: 0 | Status: SHIPPED | OUTPATIENT
Start: 2022-12-21 | End: 2023-01-18

## 2022-12-21 RX ORDER — METHYLPREDNISOLONE 4 MG
TABLET, DOSE PACK ORAL
Qty: 21 TABLET | Refills: 0 | Status: SHIPPED | OUTPATIENT
Start: 2022-12-21 | End: 2022-12-21

## 2022-12-21 RX ORDER — NAPROXEN 500 MG/1
500 TABLET ORAL 2 TIMES DAILY WITH MEALS
Qty: 60 TABLET | Refills: 0 | Status: SHIPPED | OUTPATIENT
Start: 2022-12-21 | End: 2022-12-21

## 2022-12-21 RX ORDER — NAPROXEN 500 MG/1
500 TABLET ORAL 2 TIMES DAILY WITH MEALS
Qty: 60 TABLET | Refills: 0 | Status: SHIPPED | OUTPATIENT
Start: 2022-12-21 | End: 2023-12-27

## 2022-12-21 ASSESSMENT — PAIN SCALES - GENERAL: PAINLEVEL: EXTREME PAIN (8)

## 2022-12-21 NOTE — PROGRESS NOTES
"  Assessment & Plan     Leticia was seen today for elbow pain.    Diagnoses and all orders for this visit:    Lateral epicondylitis of both elbows  Recommend scheduling Naproxen twice daily for 7 days and then take as needed.  May use Acetaminophen CR as needed.  Trial of Medrol dosepak. Continue with activity modification, ice application.  Previous trial of counter force forearm brace without improvement.    Plan initiation of physical therapy and further consultation with orthopedic specialist.    -     Naproxen (NAPROSYN) 500 MG tablet; Take 1 tablet (500 mg) by mouth 2 times daily (with meals)  -     MethylPREDNISolone (MEDROL DOSEPAK) 4 MG tablet therapy pack; Follow Package Directions  -     Orthopedic  Referral; Future  -     Physical Therapy Referral; Future  -     Acetaminophen (TYLENOL) 650 MG CR tablet; Take 1-2 tablets (650-1,300 mg) by mouth every 8 hours as needed for mild pain or fever      BMI:   Estimated body mass index is 31.28 kg/m  as calculated from the following:    Height as of this encounter: 1.575 m (5' 2\").    Weight as of this encounter: 77.6 kg (171 lb).     Return in about 2 weeks (around 1/4/2023) for consultation with Orthopedics.    Cait Roberts, CATHLEEN CNP  [facility] [place]        Subjective   Leticia is a 52 year old, presenting for the following health issues:  Elbow Pain      History of Present Illness     Patient reports moving in October and having to do a lot of physical work cutting down trees, splitting wood after her move.  Prior to that spent a lot of time packing and cleaning in August/September.    Bilateral elbow pain started at that time.  Has had worsening pain since that time, especially within the past two week.  Noted popping in right elbow and more pain/burning sensation in left elbow.  Pain is keeping her up at night.  +Weakness, needing to use both hands to lift her Yeti water cup.    Has tried stretching forearms, counter force " "brace, ice/heat and Ibuprofen, Tylenol and Aleve without improvement.         Reason for visit:  Tendinitis in both elbows  Symptom onset:  More than a month  Symptoms include:  Shooting pain from elbows, dull aching in forearms and into neck, popping in elbows  Symptom intensity:  Severe  Symptom progression:  Worsening  Had these symptoms before:  Yes  Has tried/received treatment for these symptoms:  No  What makes it worse:  Any repeated physical activity, lifting laundry baskets, milk jug  What makes it better:  Ice/heat/stretching but only temporary relief, tylenol and ibprophen     She eats 0-1 servings of fruits and vegetables daily.She consumes 0 sweetened beverage(s) daily.She exercises with enough effort to increase her heart rate 9 or less minutes per day.  She exercises with enough effort to increase her heart rate 3 or less days per week.   She is taking medications regularly.      Review of Systems     Constitutional, HEENT, cardiovascular, pulmonary, gi and gu systems are negative, except as otherwise noted.      Objective    /84   Pulse 78   Temp 98.6  F (37  C) (Tympanic)   Resp 12   Ht 1.575 m (5' 2\")   Wt 77.6 kg (171 lb)   LMP 09/13/2012   SpO2 99%   BMI 31.28 kg/m    Body mass index is 31.28 kg/m .  Physical Exam     GENERAL: healthy, alert and no distress  MS: bilateral elbows with full ROM, no erythema or swelling, tenderness to palpation over lateral epicondyle region  Strength in bilateral UE's +4/5  PSYCH: mentation appears normal, affect normal/bright          "

## 2022-12-21 NOTE — TELEPHONE ENCOUNTER
staff came to writer asking for medications ordered today to be sent to [facility] instead. Writer reordered Tylenol, Methyl prednisone and  Naproxen previously ordered today to be dispensed at [facility].  Lynn Ricardo RN [facility]

## 2022-12-21 NOTE — PATIENT INSTRUCTIONS
Lateral epicondylitis of both elbows  -     naproxen (NAPROSYN) 500 MG tablet; Take 1 tablet (500 mg) by mouth 2 times daily (with meals)  -     methylPREDNISolone (MEDROL DOSEPAK) 4 MG tablet therapy pack; Follow Package Directions  -     Orthopedic  Referral; Future  -     Physical Therapy Referral; Future    Trial of Acetaminophen , 1-2 tablets every 8 hours twice daily.

## 2022-12-28 ENCOUNTER — THERAPY VISIT (OUTPATIENT)
Dept: PHYSICAL THERAPY | Facility: CLINIC | Age: 52
End: 2022-12-28
Attending: NURSE PRACTITIONER
Payer: COMMERCIAL

## 2022-12-28 DIAGNOSIS — M77.11 LATERAL EPICONDYLITIS OF BOTH ELBOWS: ICD-10-CM

## 2022-12-28 DIAGNOSIS — M77.12 LATERAL EPICONDYLITIS OF BOTH ELBOWS: ICD-10-CM

## 2022-12-28 PROCEDURE — 97110 THERAPEUTIC EXERCISES: CPT | Mod: GP | Performed by: PHYSICAL THERAPIST

## 2022-12-28 PROCEDURE — 97035 APP MDLTY 1+ULTRASOUND EA 15: CPT | Mod: GP | Performed by: PHYSICAL THERAPIST

## 2022-12-28 PROCEDURE — 97161 PT EVAL LOW COMPLEX 20 MIN: CPT | Mod: GP | Performed by: PHYSICAL THERAPIST

## 2022-12-28 NOTE — PROGRESS NOTES
Physical Therapy Initial Evaluation  Subjective:    Patient Health History  Leticia Aquino being seen for Tennis Elbow therapy.     Problem began: 9/1/2022.   Problem occurred: Preparing to move and splitting wood and stacking logs   Pain is reported as 6/10 on pain scale.  General health as reported by patient is good.  Pertinent medical history includes: depression, menopausal, migraines/headaches and thyroid problems.     Medical allergies: none.   Surgeries include:  Other. Other surgery history details: hernia.    Current medications:  Anti-depressants and thyroid medication. Other medications details: Fluoxetione, levothyroxine, prednisone pack now completed.    Current occupation is Office.   Primary job tasks include:  Computer work.                  Therapist Generated HPI Evaluation  Problem details: Pt sold house in September of 2022, packing up and moving was a lot of work.  At her new property had a lot of trees that needed to be cut down and moved. Pt took time off in November and December to recover and it hasn't improved at all, and even has worsened.  Pt was prescribed prednisone which helped, and then the pain returned to near the same level as it was before.  .         Type of problem:  Bilateral elbows.            and is constant.  Pain radiates to:  Lower arm.   Since onset symptoms are gradually worsening.  Associated symptoms:  Loss of strength. Symptoms are exacerbated by activity  and relieved by rest.      Restrictions due to condition include:  Working in normal job without restrictions.  Barriers include:  None as reported by patient.                        Objective:  System                        ROM:  AROM:  normal                    PROM:  normal                        Strength:          Extension Wrist: Left: 4/5  Pain:+  Right: 4/5  Pain:+                Palpation:    Left wrist/elbow tenderness present at: Lateral Epicondyle  Right wrist/elbow tenderness present at:Lateral  Epicondyle                                General     ROS    Assessment/Plan:    Patient is a 52 year old female with both sides elbow complaints.    Patient has the following significant findings with corresponding treatment plan.                Diagnosis 1:  B lateral epicondylitis      Pain -  hot/cold therapy, US, manual therapy, self management, education and home program  Decreased strength - therapeutic exercise and therapeutic activities  Inflammation - cold therapy and US  Impaired muscle performance - neuro re-education  Decreased function - therapeutic activities    Therapy Evaluation Codes:   1) History comprised of:   Personal factors that impact the plan of care:      None.    Comorbidity factors that impact the plan of care are:      None.     Medications impacting care: None.  2) Examination of Body Systems comprised of:   Body structures and functions that impact the plan of care:      Elbow.   Activity limitations that impact the plan of care are:      Lifting, Reading/Computer work and Sleeping.  3) Clinical presentation characteristics are:   Evolving/Changing.  4) Decision-Making    Low complexity using standardized patient assessment instrument and/or measureable assessment of functional outcome.  Cumulative Therapy Evaluation is: Low complexity.    Previous and current functional limitations:  (See Goal Flow Sheet for this information)    Short term and Long term goals: (See Goal Flow Sheet for this information)     Communication ability:  Patient appears to be able to clearly communicate and understand verbal and written communication and follow directions correctly.  Treatment Explanation - The following has been discussed with the patient:   RX ordered/plan of care  Anticipated outcomes  Possible risks and side effects  This patient would benefit from PT intervention to resume normal activities.   Rehab potential is good.    Frequency:  1 X week, once daily  Duration:  for 6  weeks  Discharge Plan:  Achieve all LTG.  Independent in home treatment program.  Reach maximal therapeutic benefit.    Please refer to the daily flowsheet for treatment today, total treatment time and time spent performing 1:1 timed codes.

## 2023-01-11 DIAGNOSIS — E78.5 HYPERLIPIDEMIA LDL GOAL <130: ICD-10-CM

## 2023-01-11 DIAGNOSIS — N94.3 PREMENSTRUAL TENSION SYNDROME: ICD-10-CM

## 2023-01-11 RX ORDER — FLUOXETINE 40 MG/1
CAPSULE ORAL
Qty: 90 CAPSULE | Refills: 3 | Status: SHIPPED | OUTPATIENT
Start: 2023-01-11 | End: 2023-12-18

## 2023-01-11 RX ORDER — ATORVASTATIN CALCIUM 40 MG/1
TABLET, FILM COATED ORAL
Qty: 90 TABLET | Refills: 3 | Status: SHIPPED | OUTPATIENT
Start: 2023-01-11 | End: 2023-12-18

## 2023-01-11 NOTE — TELEPHONE ENCOUNTER
Patient calling requesting a refill of Lipitor and Prozac until her appointment next week.   Patient has been out for a couple days.     Future Appointments 1/11/2023 - 7/10/2023      Date Visit Type Length Department Provider     1/18/2023  7:00 AM PREVENTATIVE ADULT            30 min [facility] Cait Roberts, CATHLEEN CNP    Location Instructions:     [facility] is located at [address] [place] [address], [address] Free parking is available; access the lot by turning [address]  onto [facility] [address], then west onto [address].              1/23/2023  4:20 PM NEW 20 min FSOC  SPORTS MED Yeo, Albert, MD                 Please send to [facility] phamracy     Call or Fever message when rx are complete     Alona FITZPATRICK RN   [facility] Triage

## 2023-01-16 NOTE — PROGRESS NOTES
"ASSESSMENT & PLAN  Patient Instructions     1. Lateral epicondylitis of left elbow    2. Lateral epicondylitis of both elbows      -Has a bilateral elbow pain, left greater than right due to tendinitis  -Patient will continue with home exercise program.  -Patient has taken oral steroids, oral anti-inflammatory medications with partial short-term relief.  -Patient tolerated left lateral epicondyle cortisone injection today without complications.  Patient was given postprocedure instructions  -Patient was shown modifications on how to lift without aggravating the elbow quite some  -Patient reports waking up every night with a burning pain in bilateral elbows.  Patient will start tizanidine 4 mg at bedtime  -Patient will follow-up in 2 weeks for a potential right lateral condyle cortisone injection if patient gets good relief from today's procedure  -Patient may purchase an over-the-counter tennis elbow strap which can provide some relief  -Call direct clinic number [[phone]] at any time with questions or concerns.    Albert Yeo MD [facility]  [facility]          -----    SUBJECTIVE  Leticia Aquino is a/an 52 year old Right handed female who is seen in consultation at the request of  Cait Roberts C.N.P. for evaluation of bilateral elbow pain. The patient is seen by themselves.    Onset: 4 month(s) ago. Patient describes injury as patient moved - had to box and unbox things, also remove trees in her new yard, states she \"overdid it\".   Location of Pain: bilateral elbow lateral epicondyle, left worse than right   Rating of Pain at worst: 10/10  Rating of Pain Currently: 4/10  Worsened by: typing and mousing, grasping and lifting things, opening things, overuse   Better with: activity avoidance, Medrol Dosepack (helped while taking but then pain returned)  Treatments tried: 1 physical therapy visit with home exercise program, Medrol Dosepack, Brittnee " patches, takes Naproxen   Associated symptoms: no distal numbness or tingling; denies swelling or warmth  Orthopedic history: NO  Relevant surgical history: NO  Social history: social history: works in office setting    Past Medical History:   Diagnosis Date     Allergic rhinitis, cause unspecified      Dysmenorrhea 2006     Excessive or frequent menstruation 2006    Sees OB-GYN; on OCP Quarterly cycles. Considering Ablation      Iron deficiency anemia secondary to inadequate dietary iron intake 2004     Lateral epicondylitis of both elbows 2022     Premenstrual tension syndromes 2004    Prozac helpful     Unspecified hypothyroidism 3/2004    started low dose replacement; thyroid shad very high     Social History     Socioeconomic History     Marital status:    Tobacco Use     Smoking status: Former     Packs/day: 0.50     Years: 5.00     Pack years: 2.50     Types: Cigarettes     Start date: 1985     Quit date: 1990     Years since quittin.0     Smokeless tobacco: Never     Tobacco comments:     QUIT    Substance and Sexual Activity     Alcohol use: Yes     Comment: 2-3 times per week     Drug use: No     Sexual activity: Yes     Partners: Male     Birth control/protection: Female Surgical     Comment: Vasectomy   Other Topics Concern     Caffeine Concern Yes     Comment: 2 cups and soda daily     Exercise Yes     Comment: 3-4 per week     Parent/sibling w/ CABG, MI or angioplasty before 65F 55M? No   Social History Narrative    Works at [employer]       Patient's past medical, surgical, social, and family histories were reviewed today and no changes are noted.    REVIEW OF SYSTEMS:  10 point ROS is negative other than symptoms noted above in HPI, Past Medical History or as stated below  Constitutional: NEGATIVE for fever, chills, change in weight  Skin: NEGATIVE for worrisome rashes, moles or lesions  GI/: NEGATIVE for bowel or bladder changes  Neuro: NEGATIVE for  "weakness, dizziness or paresthesias    OBJECTIVE:  BP (!) 141/88   Ht 1.575 m (5' 2\")   Wt 77.1 kg (170 lb)   LMP 09/13/2012   BMI 31.09 kg/m     General: healthy, alert and in no distress  HEENT: no scleral icterus or conjunctival erythema  Skin: no suspicious lesions or rash. No jaundice.  CV: regular rhythm by palpation  Resp: normal respiratory effort without conversational dyspnea   Psych: normal mood and affect  Gait: normal steady gait with appropriate coordination and balance  Neuro: Normal sensory exam of bilateral hands.   MSK:  BILATERAL ELBOW  Inspection:    No swelling, bruising, discoloration, or obvious deformity or asymmetry  Palpation:    Tender about the lateral epicondyle, common extensor tendon, extensor muscle of forearm. Remainder of bony, ligamentous and tendinous landmarks are nontender.    Crepitus is Absent  Range of Motion:     Extension full / flexion full / pronation full / supination full  Strength:    extension limited slightly by pain pronation limited slightly by pain supination limited slightly by pain  Special Tests:    Positive: Pain with resisted wrist extension, pain with resisted middle finger extension, pain with resisted supination, pain with resisted pronation    Negative: pain with resisted wrist flexion    Independent visualization of the below image:  No results found for this or any previous visit (from the past 24 hour(s)).    Hand / Upper Extremity Injection/Arthrocentesis: L elbow    Date/Time: 1/23/2023 4:35 PM  Performed by: Yeo, Albert, MD  Authorized by: Yeo, Albert, MD     Indications:  Pain, tendon swelling and therapeutic  Needle Size:  22 G  Guidance: ultrasound    Approach:  Lateral  Condition: epicondylitis, lateral      Site:  L elbow  Medications:  40 mg methylPREDNISolone 40 MG/ML; 1 mL lidocaine 1 %  Outcome:  Tolerated well, no immediate complications  Procedure discussed: discussed risks, benefits, and alternatives    Consent Given by:  " Patient  Prep: patient was prepped and draped in usual sterile fashion            Albert Yeo MD [facility]

## 2023-01-18 ENCOUNTER — OFFICE VISIT (OUTPATIENT)
Dept: FAMILY MEDICINE | Facility: CLINIC | Age: 53
End: 2023-01-18
Payer: COMMERCIAL

## 2023-01-18 VITALS
TEMPERATURE: 98.3 F | DIASTOLIC BLOOD PRESSURE: 88 MMHG | OXYGEN SATURATION: 99 % | WEIGHT: 170 LBS | HEART RATE: 82 BPM | HEIGHT: 62 IN | SYSTOLIC BLOOD PRESSURE: 132 MMHG | RESPIRATION RATE: 18 BRPM | BODY MASS INDEX: 31.28 KG/M2

## 2023-01-18 DIAGNOSIS — Z00.00 ROUTINE GENERAL MEDICAL EXAMINATION AT A HEALTH CARE FACILITY: Primary | ICD-10-CM

## 2023-01-18 DIAGNOSIS — B00.1 RECURRENT COLD SORES: ICD-10-CM

## 2023-01-18 DIAGNOSIS — E78.5 HYPERLIPIDEMIA LDL GOAL <130: ICD-10-CM

## 2023-01-18 DIAGNOSIS — E03.4 HYPOTHYROIDISM DUE TO ACQUIRED ATROPHY OF THYROID: ICD-10-CM

## 2023-01-18 DIAGNOSIS — N94.3 PREMENSTRUAL TENSION SYNDROME: ICD-10-CM

## 2023-01-18 DIAGNOSIS — Z13.1 SCREENING FOR DIABETES MELLITUS: ICD-10-CM

## 2023-01-18 DIAGNOSIS — D64.9 LOW HEMOGLOBIN: ICD-10-CM

## 2023-01-18 DIAGNOSIS — Z13.0 SCREENING FOR DEFICIENCY ANEMIA: ICD-10-CM

## 2023-01-18 LAB
ALBUMIN SERPL BCG-MCNC: 4.3 G/DL (ref 3.5–5.2)
ALP SERPL-CCNC: 86 U/L (ref 35–104)
ALT SERPL W P-5'-P-CCNC: 18 U/L (ref 10–35)
ANION GAP SERPL CALCULATED.3IONS-SCNC: 14 MMOL/L (ref 7–15)
AST SERPL W P-5'-P-CCNC: 31 U/L (ref 10–35)
BILIRUB SERPL-MCNC: 0.4 MG/DL
BUN SERPL-MCNC: 21.8 MG/DL (ref 6–20)
CALCIUM SERPL-MCNC: 9.4 MG/DL (ref 8.6–10)
CHLORIDE SERPL-SCNC: 101 MMOL/L (ref 98–107)
CHOLEST SERPL-MCNC: 228 MG/DL
CREAT SERPL-MCNC: 0.7 MG/DL (ref 0.51–0.95)
DEPRECATED HCO3 PLAS-SCNC: 23 MMOL/L (ref 22–29)
ERYTHROCYTE [DISTWIDTH] IN BLOOD BY AUTOMATED COUNT: 17 % (ref 10–15)
GFR SERPL CREATININE-BSD FRML MDRD: >90 ML/MIN/1.73M2
GLUCOSE SERPL-MCNC: 94 MG/DL (ref 70–99)
HCT VFR BLD AUTO: 35.1 % (ref 35–47)
HDLC SERPL-MCNC: 80 MG/DL
HGB BLD-MCNC: 11.3 G/DL (ref 11.7–15.7)
LDLC SERPL CALC-MCNC: 137 MG/DL
MCH RBC QN AUTO: 28.5 PG (ref 26.5–33)
MCHC RBC AUTO-ENTMCNC: 32.2 G/DL (ref 31.5–36.5)
MCV RBC AUTO: 88 FL (ref 78–100)
NONHDLC SERPL-MCNC: 148 MG/DL
PLATELET # BLD AUTO: 247 10E3/UL (ref 150–450)
POTASSIUM SERPL-SCNC: 4.6 MMOL/L (ref 3.4–5.3)
PROT SERPL-MCNC: 7.5 G/DL (ref 6.4–8.3)
RBC # BLD AUTO: 3.97 10E6/UL (ref 3.8–5.2)
SODIUM SERPL-SCNC: 138 MMOL/L (ref 136–145)
TRIGL SERPL-MCNC: 56 MG/DL
TSH SERPL DL<=0.005 MIU/L-ACNC: 1.44 UIU/ML (ref 0.3–4.2)
WBC # BLD AUTO: 2.7 10E3/UL (ref 4–11)

## 2023-01-18 PROCEDURE — 83550 IRON BINDING TEST: CPT | Performed by: NURSE PRACTITIONER

## 2023-01-18 PROCEDURE — 80053 COMPREHEN METABOLIC PANEL: CPT | Performed by: NURSE PRACTITIONER

## 2023-01-18 PROCEDURE — 80061 LIPID PANEL: CPT | Performed by: NURSE PRACTITIONER

## 2023-01-18 PROCEDURE — 36415 COLL VENOUS BLD VENIPUNCTURE: CPT | Performed by: NURSE PRACTITIONER

## 2023-01-18 PROCEDURE — 99214 OFFICE O/P EST MOD 30 MIN: CPT | Mod: 25 | Performed by: NURSE PRACTITIONER

## 2023-01-18 PROCEDURE — 85027 COMPLETE CBC AUTOMATED: CPT | Performed by: NURSE PRACTITIONER

## 2023-01-18 PROCEDURE — 99396 PREV VISIT EST AGE 40-64: CPT | Performed by: NURSE PRACTITIONER

## 2023-01-18 PROCEDURE — 84443 ASSAY THYROID STIM HORMONE: CPT | Performed by: NURSE PRACTITIONER

## 2023-01-18 PROCEDURE — 83540 ASSAY OF IRON: CPT | Performed by: NURSE PRACTITIONER

## 2023-01-18 PROCEDURE — 82728 ASSAY OF FERRITIN: CPT | Performed by: NURSE PRACTITIONER

## 2023-01-18 RX ORDER — ACYCLOVIR 200 MG/1
200 CAPSULE ORAL
Qty: 25 CAPSULE | Refills: 1 | Status: SHIPPED | OUTPATIENT
Start: 2023-01-18 | End: 2024-04-09

## 2023-01-18 RX ORDER — LEVOTHYROXINE SODIUM 100 UG/1
100 TABLET ORAL DAILY
Qty: 90 TABLET | Refills: 3 | Status: SHIPPED | OUTPATIENT
Start: 2023-01-18 | End: 2024-02-22

## 2023-01-18 ASSESSMENT — ENCOUNTER SYMPTOMS
PARESTHESIAS: 0
DIARRHEA: 0
DYSURIA: 0
CHILLS: 0
ARTHRALGIAS: 0
SHORTNESS OF BREATH: 0
HEMATOCHEZIA: 0
ABDOMINAL PAIN: 0
BREAST MASS: 0
CONSTIPATION: 0
JOINT SWELLING: 1
COUGH: 0
EYE PAIN: 0
PALPITATIONS: 0
HEMATURIA: 0
FREQUENCY: 0
FEVER: 0
WEAKNESS: 1
SORE THROAT: 0
NAUSEA: 0
DIZZINESS: 0
HEADACHES: 0
NERVOUS/ANXIOUS: 0
MYALGIAS: 1
HEARTBURN: 0

## 2023-01-18 NOTE — PROGRESS NOTES
SUBJECTIVE:   CC: Leticia is an 52 year old who presents for preventive health visit.   Patient has been advised of split billing requirements and indicates understanding: Yes  Healthy Habits:     Getting at least 3 servings of Calcium per day:  NO    Bi-annual eye exam:  Yes    Dental care twice a year:  Yes    Sleep apnea or symptoms of sleep apnea:  None    Diet:  Carbohydrate counting    Frequency of exercise:  1 day/week    Duration of exercise:  Less than 15 minutes    Taking medications regularly:  Yes    Medication side effects:  None    PHQ-2 Total Score: 0    Additional concerns today:  Yes      Discuss elbow pain- has gone to physical therapy and does exercises.  Had initial improvement.  Does have an appointment with a specialist on 1/23/23.      Has been out of fluoxetine and atorvastatin for two weeks.   Stable on Fluoxetine for premenstrual syndrome.      Hyperlipidemia Follow-Up  Recent Labs   Lab Test 11/26/21  0959 10/28/20  1158 03/08/17  0758 06/08/15  0808   CHOL 193 225*   < > 227*   HDL 96 82   < > 46*   LDL 93 135*   < > 146*   TRIG 21 41   < > 175*   CHOLHDLRATIO  --   --   --  4.9    < > = values in this interval not displayed.         Are you regularly taking any medication or supplement to lower your cholesterol?   No - has been out     Are you having muscle aches or other side effects that you think could be caused by your cholesterol lowering medication?  No    Hypothyroidism Follow-up  TSH   Date Value Ref Range Status   11/26/2021 1.19 0.40 - 4.00 mU/L Final   09/29/2020 2.40 0.40 - 4.00 mU/L Final   05/08/2020 2.48 0.40 - 4.00 mU/L Final   03/14/2019 1.87 0.40 - 4.00 mU/L Final   06/06/2018 1.82 0.40 - 4.00 mU/L Final   04/27/2017 3.20 0.40 - 4.00 mU/L Final         Since last visit, patient describes the following symptoms: Weight stable, no hair loss, no skin changes, no constipation, no loose stools      Today's PHQ-2 Score:   PHQ-2 ( 1999 [employer]) 1/18/2023   Q1: Little  interest or pleasure in doing things 0   Q2: Feeling down, depressed or hopeless 0   PHQ-2 Score 0   PHQ-2 Total Score (12-17 Years)- Positive if 3 or more points; Administer PHQ-A if positive -   Q1: Little interest or pleasure in doing things Not at all   Q2: Feeling down, depressed or hopeless Not at all   PHQ-2 Score 0           Social History     Tobacco Use     Smoking status: Former     Packs/day: 0.50     Years: 5.00     Pack years: 2.50     Types: Cigarettes     Start date: 1985     Quit date: 1990     Years since quittin.0     Smokeless tobacco: Never     Tobacco comments:     QUIT    Substance Use Topics     Alcohol use: Yes     Comment: 2-3 times per week     If you drink alcohol do you typically have >3 drinks per day or >7 drinks per week? No    Alcohol Use 2023   Prescreen: >3 drinks/day or >7 drinks/week? No   Prescreen: >3 drinks/day or >7 drinks/week? -   AUDIT SCORE  -       Reviewed orders with patient.  Reviewed health maintenance and updated orders accordingly - Yes  BP Readings from Last 3 Encounters:   23 132/88   22 120/84   21 122/84    Wt Readings from Last 3 Encounters:   23 77.1 kg (170 lb)   22 77.6 kg (171 lb)   21 76.9 kg (169 lb 9.6 oz)                  Patient Active Problem List   Diagnosis     Premenstrual tension syndrome     CARDIOVASCULAR SCREENING; LDL GOAL LESS THAN 160     Pain in joint, ankle and foot     Hypothyroidism due to acquired atrophy of thyroid     Hyperlipidemia LDL goal <130     Family history of breast cancer in female - maternal half sister age 50     Recurrent cold sores     Lateral epicondylitis of both elbows     Past Surgical History:   Procedure Laterality Date     GYN SURGERY  2011    ablation     HERNIA REPAIR  1975     HYSTERECTOMY SUPRACERVICAL  2012    partial     [facility] NONSPECIFIC PROCEDURE  1973    Right Inguinal Hernia Repair     [facility] NONSPECIFIC PROCEDURE  1995    D&C  for Spontaneous        Social History     Tobacco Use     Smoking status: Former     Packs/day: 0.50     Years: 5.00     Pack years: 2.50     Types: Cigarettes     Start date: 1985     Quit date: 1990     Years since quittin.0     Smokeless tobacco: Never     Tobacco comments:     QUIT    Substance Use Topics     Alcohol use: Yes     Comment: 2-3 times per week     Family History   Problem Relation Age of Onset     Thyroid Disease Mother         Hypothyroid     Hypertension Mother      Hyperlipidemia Mother      Cerebrovascular Disease Mother 70        TIA - former smoker     Hypertension Father      Diabetes Father         Insulin Dependent     Cardiovascular Father         Triple by-pass in [id number]     Coronary Artery Disease Father      Cancer Maternal Grandfather      Thyroid Disease Daughter         Dx at age 10     Thyroid Disease Son      Colon Cancer Maternal Half-Brother      Breast Cancer Maternal Half-Sister 50     Colon Cancer No family hx of      Osteoporosis No family hx of          Current Outpatient Medications   Medication Sig Dispense Refill     acetaminophen (TYLENOL) 650 MG CR tablet Take 1-2 tablets (650-1,300 mg) by mouth every 8 hours as needed for mild pain or fever 100 tablet 5     acyclovir (ZOVIRAX) 200 MG capsule Take 1 capsule (200 mg) by mouth 5 times daily (May use as needed for cold sores) 25 capsule 1     levothyroxine (SYNTHROID) 100 MCG tablet Take 1 tablet (100 mcg) by mouth daily 90 tablet 3     atorvastatin (LIPITOR) 40 MG tablet TAKE 1 TABLET DAILY (Patient not taking: Reported on 2023) 90 tablet 3     FLUoxetine (PROZAC) 40 MG capsule TAKE 1 CAPSULE DAILY (Patient not taking: Reported on 2023) 90 capsule 3     naproxen (NAPROSYN) 500 MG tablet Take 1 tablet (500 mg) by mouth 2 times daily (with meals) (Patient not taking: Reported on 2023) 60 tablet 0       Breast Cancer Screening:    FHS-7:   Breast CA Risk Assessment (FHS-7)  11/5/2021 11/5/2021 11/26/2021 12/1/2021   Did any of your first-degree relatives have breast or ovarian cancer? Yes Yes Yes No   Did any of your relatives have bilateral breast cancer? No No No No   Did any man in your family have breast cancer? No No No No   Did any woman in your family have breast and ovarian cancer? No Yes Yes No   Did any woman in your family have breast cancer before age 50 y? Yes Yes Yes No   Do you have 2 or more relatives with breast and/or ovarian cancer? No No No No   Do you have 2 or more relatives with breast and/or bowel cancer? No No No No       Mammogram Screening: Recommended annual mammography  Pertinent mammograms are reviewed under the imaging tab.    History of abnormal Pap smear: NO - age 30-65 PAP every 5 years with negative HPV co-testing recommended  PAP / HPV Latest Ref Rng & Units 3/15/2019 2/28/2014 5/3/2006   PAP (Historical) - NIL NIL NIL   HPV16 NEG:Negative Negative - -   HPV18 NEG:Negative Negative - -   HRHPV NEG:Negative Negative - -     Reviewed and updated as needed this visit by clinical staff   Tobacco  Allergies  Meds              Reviewed and updated as needed this visit by Provider                 Past Medical History:   Diagnosis Date     Allergic rhinitis, cause unspecified      Dysmenorrhea 12/18/2006     Excessive or frequent menstruation 12/18/2006    Sees OB-GYN; on OCP Quarterly cycles. Considering Ablation      Iron deficiency anemia secondary to inadequate dietary iron intake 4/1/2004     Lateral epicondylitis of both elbows 12/28/2022     Premenstrual tension syndromes 2004    Prozac helpful     Unspecified hypothyroidism 3/2004    started low dose replacement; thyroid shad very high      Past Surgical History:   Procedure Laterality Date     GYN SURGERY  11/01/2011    ablation     HERNIA REPAIR  1975     HYSTERECTOMY SUPRACERVICAL  09/01/2012    partial     ZZC NONSPECIFIC PROCEDURE  01/01/1973    Right Inguinal Hernia Repair     ZZC NONSPECIFIC  "PROCEDURE  1995    D&C for Spontaneous        Review of Systems   Constitutional: Negative for chills and fever.   HENT: Negative for congestion, ear pain, hearing loss and sore throat.    Eyes: Negative for pain and visual disturbance.   Respiratory: Negative for cough and shortness of breath.    Cardiovascular: Negative for chest pain, palpitations and peripheral edema.   Gastrointestinal: Negative for abdominal pain, constipation, diarrhea, heartburn, hematochezia and nausea.   Breasts:  Negative for tenderness, breast mass and discharge.   Genitourinary: Negative for dysuria, frequency, genital sores, hematuria, pelvic pain, urgency, vaginal bleeding and vaginal discharge.   Musculoskeletal: Positive for joint swelling and myalgias. Negative for arthralgias.   Skin: Negative for rash.   Neurological: Positive for weakness. Negative for dizziness, headaches and paresthesias.   Psychiatric/Behavioral: Negative for mood changes. The patient is not nervous/anxious.           OBJECTIVE:   /88   Pulse 82   Temp 98.3  F (36.8  C)   Resp 18   Ht 1.575 m (5' 2\")   Wt 77.1 kg (170 lb)   LMP 2012   HC 18 cm (7.09\")   SpO2 99%   BMI 31.09 kg/m       Physical Exam     GENERAL: healthy, alert and no distress  EYES: Eyes grossly normal to inspection, PERRL and conjunctivae and sclerae normal  HENT: ear canals and TM's normal, nose and mouth without ulcers or lesions  NECK: no adenopathy, no asymmetry, masses, or scars and thyroid normal to palpation  RESP: lungs clear to auscultation - no rales, rhonchi or wheezes  CV: regular rate and rhythm, normal S1 S2, no S3 or S4, no murmur, click or rub, no peripheral edema  ABDOMEN: soft, nontender, no hepatosplenomegaly, no masses and bowel sounds normal  MS: no gross musculoskeletal defects noted, no edema  SKIN: no suspicious lesions or rashes  NEURO: Normal strength and tone, mentation intact and speech normal  PSYCH: mentation appears normal, " "affect normal/bright      ASSESSMENT/PLAN:     Leticia was seen today for physical.    Diagnoses and all orders for this visit:    Routine general medical examination at a health care facility    Screening for diabetes mellitus  -     COMPREHENSIVE METABOLIC PANEL    Screening for deficiency anemia  -     CBC with platelets    Hyperlipidemia LDL goal <130  Stable on Atorvastatin 40 mg daily.    Recent Labs   Lab Test 11/26/21  0959 10/28/20  1158 03/08/17  0758 06/08/15  0808   CHOL 193 225*   < > 227*   HDL 96 82   < > 46*   LDL 93 135*   < > 146*   TRIG 21 41   < > 175*   CHOLHDLRATIO  --   --   --  4.9    < > = values in this interval not displayed.     -     Lipid panel reflex to direct LDL Non-fasting    Hypothyroidism due to acquired atrophy of thyroid  Stable on levothyroxine 100 mcg daily.   TSH   Date Value Ref Range Status   11/26/2021 1.19 0.40 - 4.00 mU/L Final   09/29/2020 2.40 0.40 - 4.00 mU/L Final   05/08/2020 2.48 0.40 - 4.00 mU/L Final   03/14/2019 1.87 0.40 - 4.00 mU/L Final   06/06/2018 1.82 0.40 - 4.00 mU/L Final   04/27/2017 3.20 0.40 - 4.00 mU/L Final     -     levothyroxine (SYNTHROID) 100 MCG tablet; Take 1 tablet (100 mcg) by mouth daily  -     TSH WITH FREE T4 REFLEX    Recurrent cold sores  Intermittent use of Acyclovir.    -     acyclovir (ZOVIRAX) 200 MG capsule; Take 1 capsule (200 mg) by mouth 5 times daily (May use as needed for cold sores)    Premenstrual tension syndrome  Stable on Fluoxetine 40 mg daily.      Other orders  -     REVIEW OF HEALTH MAINTENANCE PROTOCOL ORDERS          COUNSELING:  Reviewed preventive health counseling, as reflected in patient instructions      BMI:   Estimated body mass index is 31.09 kg/m  as calculated from the following:    Height as of this encounter: 1.575 m (5' 2\").    Weight as of this encounter: 77.1 kg (170 lb).         She reports that she quit smoking about 33 years ago. Her smoking use included cigarettes. She started smoking about 38 " years ago. She has a 2.50 pack-year smoking history. She has never used smokeless tobacco.      CATHLEEN Schwab CNP  [facility] [place]

## 2023-01-20 DIAGNOSIS — D72.819 LEUKOPENIA, UNSPECIFIED TYPE: ICD-10-CM

## 2023-01-20 DIAGNOSIS — D64.9 LOW HEMOGLOBIN: Primary | ICD-10-CM

## 2023-01-20 LAB
FERRITIN SERPL-MCNC: 11 NG/ML (ref 11–328)
IRON BINDING CAPACITY (ROCHE): 450 UG/DL (ref 240–430)
IRON SATN MFR SERPL: 20 % (ref 15–46)
IRON SERPL-MCNC: 90 UG/DL (ref 37–145)

## 2023-01-20 NOTE — RESULT ENCOUNTER NOTE
Dear Leticia,     -Hemoglobin is decreased indicating anemia.    -White blood cell is low and platelet counts are normal.  I added on iron studies for further evaluation of anemia and I would like you to recheck your white cell count in about 1 month.  You can schedule a lab only visit for this.    -Cholesterol levels (HDL, Triglycerides) are okay. LDL is slightly elevated and likely related to being off your cholesterol medication.   ADVISE: rechecking  in 1 year.  -Liver and gallbladder tests are normal (ALT,AST, Alk phos, bilirubin), kidney function is normal (Cr, GFR), sodium is normal, potassium is normal, calcium is normal, glucose is normal.  -TSH (thyroid stimulating hormone) level is normal which indicates appropriate thyroid replacement dosing.  ADVISE: continuing same replacement dose and rechecking this in 12 months.      Please send a [employer] message or call [phone]  if you have any questions.      Cait Roberts, CATHLEEN, CNP  [facility] [place]    If you have further questions about the interpretation of your labs, [email or web address] is a good website to check out for further information.

## 2023-01-23 ENCOUNTER — OFFICE VISIT (OUTPATIENT)
Dept: ORTHOPEDICS | Facility: CLINIC | Age: 53
End: 2023-01-23
Attending: NURSE PRACTITIONER
Payer: COMMERCIAL

## 2023-01-23 VITALS
DIASTOLIC BLOOD PRESSURE: 88 MMHG | BODY MASS INDEX: 31.28 KG/M2 | SYSTOLIC BLOOD PRESSURE: 141 MMHG | WEIGHT: 170 LBS | HEIGHT: 62 IN

## 2023-01-23 DIAGNOSIS — M77.12 LATERAL EPICONDYLITIS OF BOTH ELBOWS: ICD-10-CM

## 2023-01-23 DIAGNOSIS — M77.11 LATERAL EPICONDYLITIS OF BOTH ELBOWS: ICD-10-CM

## 2023-01-23 DIAGNOSIS — M77.12 LATERAL EPICONDYLITIS OF LEFT ELBOW: Primary | ICD-10-CM

## 2023-01-23 PROCEDURE — 20551 NJX 1 TENDON ORIGIN/INSJ: CPT | Mod: LT | Performed by: FAMILY MEDICINE

## 2023-01-23 PROCEDURE — 99243 OFF/OP CNSLTJ NEW/EST LOW 30: CPT | Mod: 25 | Performed by: FAMILY MEDICINE

## 2023-01-23 RX ORDER — LIDOCAINE HYDROCHLORIDE 10 MG/ML
1 INJECTION, SOLUTION INFILTRATION; PERINEURAL
Status: SHIPPED | OUTPATIENT
Start: 2023-01-23

## 2023-01-23 RX ORDER — METHYLPREDNISOLONE ACETATE 40 MG/ML
40 INJECTION, SUSPENSION INTRA-ARTICULAR; INTRALESIONAL; INTRAMUSCULAR; SOFT TISSUE
Status: SHIPPED | OUTPATIENT
Start: 2023-01-23

## 2023-01-23 RX ADMIN — LIDOCAINE HYDROCHLORIDE 1 ML: 10 INJECTION, SOLUTION INFILTRATION; PERINEURAL at 16:35

## 2023-01-23 RX ADMIN — METHYLPREDNISOLONE ACETATE 40 MG: 40 INJECTION, SUSPENSION INTRA-ARTICULAR; INTRALESIONAL; INTRAMUSCULAR; SOFT TISSUE at 16:35

## 2023-01-23 NOTE — LETTER
"    1/23/2023         RE: Leticia Aquino  [id number] [place] [address]        Dear Colleague,    Thank you for referring your patient, Leticia Aquino, to the [facility] [place]. Please see a copy of my visit note below.    ASSESSMENT & PLAN  Patient Instructions     1. Lateral epicondylitis of left elbow    2. Lateral epicondylitis of both elbows      -Has a bilateral elbow pain, left greater than right due to tendinitis  -Patient will continue with home exercise program.  -Patient has taken oral steroids, oral anti-inflammatory medications with partial short-term relief.  -Patient tolerated left lateral epicondyle cortisone injection today without complications.  Patient was given postprocedure instructions  -Patient was shown modifications on how to lift without aggravating the elbow quite some  -Patient reports waking up every night with a burning pain in bilateral elbows.  Patient will start tizanidine 4 mg at bedtime  -Patient will follow-up in 2 weeks for a potential right lateral condyle cortisone injection if patient gets good relief from today's procedure  -Patient may purchase an over-the-counter tennis elbow strap which can provide some relief  -Call direct clinic number [[phone]] at any time with questions or concerns.    Albert Yeo MD [facility]  [facility]          -----    SUBJECTIVE  Leticia Aquino is a/an 52 year old Right handed female who is seen in consultation at the request of  Cait Roberts C.N.P. for evaluation of bilateral elbow pain. The patient is seen by themselves.    Onset: 4 month(s) ago. Patient describes injury as patient moved - had to box and unbox things, also remove trees in her new yard, states she \"overdid it\".   Location of Pain: bilateral elbow lateral epicondyle, left worse than right   Rating of Pain at worst: 10/10  Rating of Pain Currently: " 4/10  Worsened by: typing and mousing, grasping and lifting things, opening things, overuse   Better with: activity avoidance, Medrol Dosepack (helped while taking but then pain returned)  Treatments tried: 1 physical therapy visit with home exercise program, Medrol Dosepack, IcyHot patches, takes Naproxen   Associated symptoms: no distal numbness or tingling; denies swelling or warmth  Orthopedic history: NO  Relevant surgical history: NO  Social history: social history: works in office setting    Past Medical History:   Diagnosis Date     Allergic rhinitis, cause unspecified      Dysmenorrhea 2006     Excessive or frequent menstruation 2006    Sees OB-GYN; on OCP Quarterly cycles. Considering Ablation      Iron deficiency anemia secondary to inadequate dietary iron intake 2004     Lateral epicondylitis of both elbows 2022     Premenstrual tension syndromes     Prozac helpful     Unspecified hypothyroidism 3/2004    started low dose replacement; thyroid shad very high     Social History     Socioeconomic History     Marital status:    Tobacco Use     Smoking status: Former     Packs/day: 0.50     Years: 5.00     Pack years: 2.50     Types: Cigarettes     Start date: 1985     Quit date: 1990     Years since quittin.0     Smokeless tobacco: Never     Tobacco comments:     QUIT    Substance and Sexual Activity     Alcohol use: Yes     Comment: 2-3 times per week     Drug use: No     Sexual activity: Yes     Partners: Male     Birth control/protection: Female Surgical     Comment: Vasectomy   Other Topics Concern     Caffeine Concern Yes     Comment: 2 cups and soda daily     Exercise Yes     Comment: 3-4 per week     Parent/sibling w/ CABG, MI or angioplasty before 65F 55M? No   Social History Narrative    Works at [employer]       Patient's past medical, surgical, social, and family histories were reviewed today and no changes are noted.    REVIEW OF SYSTEMS:  10  "point ROS is negative other than symptoms noted above in HPI, Past Medical History or as stated below  Constitutional: NEGATIVE for fever, chills, change in weight  Skin: NEGATIVE for worrisome rashes, moles or lesions  GI/: NEGATIVE for bowel or bladder changes  Neuro: NEGATIVE for weakness, dizziness or paresthesias    OBJECTIVE:  BP (!) 141/88   Ht 1.575 m (5' 2\")   Wt 77.1 kg (170 lb)   LMP 09/13/2012   BMI 31.09 kg/m     General: healthy, alert and in no distress  HEENT: no scleral icterus or conjunctival erythema  Skin: no suspicious lesions or rash. No jaundice.  CV: regular rhythm by palpation  Resp: normal respiratory effort without conversational dyspnea   Psych: normal mood and affect  Gait: normal steady gait with appropriate coordination and balance  Neuro: Normal sensory exam of bilateral hands.   MSK:  BILATERAL ELBOW  Inspection:    No swelling, bruising, discoloration, or obvious deformity or asymmetry  Palpation:    Tender about the lateral epicondyle, common extensor tendon, extensor muscle of forearm. Remainder of bony, ligamentous and tendinous landmarks are nontender.    Crepitus is Absent  Range of Motion:     Extension full / flexion full / pronation full / supination full  Strength:    extension limited slightly by pain pronation limited slightly by pain supination limited slightly by pain  Special Tests:    Positive: Pain with resisted wrist extension, pain with resisted middle finger extension, pain with resisted supination, pain with resisted pronation    Negative: pain with resisted wrist flexion    Independent visualization of the below image:  No results found for this or any previous visit (from the past 24 hour(s)).    Hand / Upper Extremity Injection/Arthrocentesis: L elbow    Date/Time: 1/23/2023 4:35 PM  Performed by: Yeo, Albert, MD  Authorized by: Yeo, Albert, MD     Indications:  Pain, tendon swelling and therapeutic  Needle Size:  22 G  Guidance: ultrasound    Approach:  " Lateral  Condition: epicondylitis, lateral      Site:  L elbow  Medications:  40 mg methylPREDNISolone 40 MG/ML; 1 mL lidocaine 1 %  Outcome:  Tolerated well, no immediate complications  Procedure discussed: discussed risks, benefits, and alternatives    Consent Given by:  Patient  Prep: patient was prepped and draped in usual sterile fashion            Albert Yeo MD [facility]        Again, thank you for allowing me to participate in the care of your patient.        Sincerely,        Albert Yeo, MD

## 2023-01-23 NOTE — PATIENT INSTRUCTIONS
1. Lateral epicondylitis of left elbow    2. Lateral epicondylitis of both elbows      -Has a bilateral elbow pain, left greater than right due to tendinitis  -Patient will continue with home exercise program.  -Patient has taken oral steroids, oral anti-inflammatory medications with partial short-term relief.  -Patient tolerated left lateral epicondyle cortisone injection today without complications.  Patient was given postprocedure instructions  -Patient was shown modifications on how to lift without aggravating the elbow quite some  -Patient reports waking up every night with a burning pain in bilateral elbows.  Patient will start tizanidine 4 mg at bedtime  -Patient will follow-up in 2 weeks for a potential right lateral condyle cortisone injection if patient gets good relief from today's procedure  -Patient may purchase an over-the-counter tennis elbow strap which can provide some relief  -Call direct clinic number [[phone]] at any time with questions or concerns.    Albert Yeo MD [facility]  [facility] [place]

## 2023-01-24 ENCOUNTER — TRANSFERRED RECORDS (OUTPATIENT)
Dept: HEALTH INFORMATION MANAGEMENT | Facility: CLINIC | Age: 53
End: 2023-01-24
Payer: COMMERCIAL

## 2023-01-25 ENCOUNTER — ANCILLARY PROCEDURE (OUTPATIENT)
Dept: MAMMOGRAPHY | Facility: CLINIC | Age: 53
End: 2023-01-25
Payer: COMMERCIAL

## 2023-01-25 PROCEDURE — 77063 BREAST TOMOSYNTHESIS BI: CPT | Mod: TC | Performed by: RADIOLOGY

## 2023-01-25 PROCEDURE — 77067 SCR MAMMO BI INCL CAD: CPT | Mod: TC | Performed by: RADIOLOGY

## 2023-01-25 NOTE — RESULT ENCOUNTER NOTE
Dear Leticia,     -Ferritin (iron) level is normal, but slightly at the low end of normal.  I recommend a daily multivitamin with iron.    -Total iron saturation level is normal.        Please send a "[employer] message or call [phone]  if you have any questions.      Cait Roberts, CATHLEEN, CNP  [facility] [place]    If you have further questions about the interpretation of your labs, [email or web address] is a good website to check out for further information.     denies pain/discomfort

## 2023-01-31 NOTE — PROGRESS NOTES
ASSESSMENT & PLAN  Patient Instructions     1. Lateral epicondylitis of right elbow      -Patient is following up for right elbow pain due to tendinitis  -Patient tolerated right lateral condyle cortisone injection today without complications.  Patient was given postprocedure instructions  -Patient will restart home exercise program in 1 to 2 weeks once pain improves.  Patient should continue home exercises for the next 3 to 4 months.  Patient was advised on how to lift to minimize strain on the common extensor tendon.  -Patient will follow up when pain returns  -Call direct clinic number [[phone]] at any time with questions or concerns.    Albert Yeo MD [facility]  [facility]          -----    SUBJECTIVE:  Leticia Aquino is a 52 year old female who is seen for right elbow intra-articular corticosteriod injection     Patient rates pain as 6/10 pre-procedure. Patient rates pain as 6/10 post-procedure.    Hand / Upper Extremity Injection/Arthrocentesis: R elbow    Date/Time: 2/6/2023 8:20 AM  Performed by: Yeo, Albert, MD  Authorized by: Yeo, Albert, MD     Indications:  Pain and therapeutic  Needle Size:  25 G  Guidance: ultrasound    Condition: epicondylitis, lateral      Site:  R elbow  Medications:  40 mg methylPREDNISolone 40 MG/ML; 1 mL lidocaine 1 %  Outcome:  Tolerated well, no immediate complications  Procedure discussed: discussed risks, benefits, and alternatives    Consent Given by:  Patient  Prep: patient was prepped and draped in usual sterile fashion            Albert Yeo MD, [facility]

## 2023-02-06 ENCOUNTER — OFFICE VISIT (OUTPATIENT)
Dept: ORTHOPEDICS | Facility: CLINIC | Age: 53
End: 2023-02-06
Payer: COMMERCIAL

## 2023-02-06 VITALS
SYSTOLIC BLOOD PRESSURE: 126 MMHG | WEIGHT: 170 LBS | HEIGHT: 62 IN | BODY MASS INDEX: 31.28 KG/M2 | DIASTOLIC BLOOD PRESSURE: 74 MMHG

## 2023-02-06 DIAGNOSIS — M77.11 LATERAL EPICONDYLITIS OF RIGHT ELBOW: Primary | ICD-10-CM

## 2023-02-06 PROCEDURE — 20551 NJX 1 TENDON ORIGIN/INSJ: CPT | Mod: RT | Performed by: FAMILY MEDICINE

## 2023-02-06 RX ORDER — METHYLPREDNISOLONE ACETATE 40 MG/ML
40 INJECTION, SUSPENSION INTRA-ARTICULAR; INTRALESIONAL; INTRAMUSCULAR; SOFT TISSUE
Status: SHIPPED | OUTPATIENT
Start: 2023-02-06

## 2023-02-06 RX ORDER — LIDOCAINE HYDROCHLORIDE 10 MG/ML
1 INJECTION, SOLUTION INFILTRATION; PERINEURAL
Status: SHIPPED | OUTPATIENT
Start: 2023-02-06

## 2023-02-06 RX ADMIN — LIDOCAINE HYDROCHLORIDE 1 ML: 10 INJECTION, SOLUTION INFILTRATION; PERINEURAL at 08:20

## 2023-02-06 RX ADMIN — METHYLPREDNISOLONE ACETATE 40 MG: 40 INJECTION, SUSPENSION INTRA-ARTICULAR; INTRALESIONAL; INTRAMUSCULAR; SOFT TISSUE at 08:20

## 2023-02-06 NOTE — LETTER
2/6/2023         RE: Leticia Aquino  [address] [place] [address]        Dear Colleague,    Thank you for referring your patient, Leticia Aquino, to the [facility] [place]. Please see a copy of my visit note below.    ASSESSMENT & PLAN  Patient Instructions     1. Lateral epicondylitis of right elbow      -Patient is following up for right elbow pain due to tendinitis  -Patient tolerated right lateral condyle cortisone injection today without complications.  Patient was given postprocedure instructions  -Patient will restart home exercise program in 1 to 2 weeks once pain improves.  Patient should continue home exercises for the next 3 to 4 months.  Patient was advised on how to lift to minimize strain on the common extensor tendon.  -Patient will follow up when pain returns  -Call direct clinic number [[phone]] at any time with questions or concerns.    Albert Yeo MD [facility] and [facility] Medicine  [facility] [place]          -----    SUBJECTIVE:  Leticia Aquino is a 52 year old female who is seen for right elbow intra-articular corticosteriod injection     Patient rates pain as 6/10 pre-procedure. Patient rates pain as 6/10 post-procedure.    Hand / Upper Extremity Injection/Arthrocentesis: R elbow    Date/Time: 2/6/2023 8:20 AM  Performed by: Yeo, Albert, MD  Authorized by: Yeo, Albert, MD     Indications:  Pain and therapeutic  Needle Size:  25 G  Guidance: ultrasound    Condition: epicondylitis, lateral      Site:  R elbow  Medications:  40 mg methylPREDNISolone 40 MG/ML; 1 mL lidocaine 1 %  Outcome:  Tolerated well, no immediate complications  Procedure discussed: discussed risks, benefits, and alternatives    Consent Given by:  Patient  Prep: patient was prepped and draped in usual sterile fashion            Albert Yeo MD, [facility]        Again, thank you for allowing me to participate in  the care of your patient.        Sincerely,        Albert Yeo, MD

## 2023-02-06 NOTE — PATIENT INSTRUCTIONS
1. Lateral epicondylitis of right elbow      -Patient is following up for right elbow pain due to tendinitis  -Patient tolerated right lateral condyle cortisone injection today without complications.  Patient was given postprocedure instructions  -Patient will restart home exercise program in 1 to 2 weeks once pain improves.  Patient should continue home exercises for the next 3 to 4 months.  Patient was advised on how to lift to minimize strain on the common extensor tendon.  -Patient will follow up when pain returns  -Call direct clinic number [[phone]] at any time with questions or concerns.    Albert Yeo MD [facility] Orthoped[facility] and Sports Medicine  [facility] [place]

## 2023-02-17 ENCOUNTER — LAB (OUTPATIENT)
Dept: LAB | Facility: CLINIC | Age: 53
End: 2023-02-17
Payer: COMMERCIAL

## 2023-02-17 DIAGNOSIS — D72.819 LEUKOPENIA, UNSPECIFIED TYPE: ICD-10-CM

## 2023-02-17 LAB
BASOPHILS # BLD AUTO: 0 10E3/UL (ref 0–0.2)
BASOPHILS NFR BLD AUTO: 1 %
EOSINOPHIL # BLD AUTO: 0.3 10E3/UL (ref 0–0.7)
EOSINOPHIL NFR BLD AUTO: 7 %
ERYTHROCYTE [DISTWIDTH] IN BLOOD BY AUTOMATED COUNT: 17.2 % (ref 10–15)
HCT VFR BLD AUTO: 35.4 % (ref 35–47)
HGB BLD-MCNC: 11.3 G/DL (ref 11.7–15.7)
IMM GRANULOCYTES # BLD: 0 10E3/UL
IMM GRANULOCYTES NFR BLD: 0 %
LYMPHOCYTES # BLD AUTO: 1.3 10E3/UL (ref 0.8–5.3)
LYMPHOCYTES NFR BLD AUTO: 29 %
MCH RBC QN AUTO: 28.4 PG (ref 26.5–33)
MCHC RBC AUTO-ENTMCNC: 31.9 G/DL (ref 31.5–36.5)
MCV RBC AUTO: 89 FL (ref 78–100)
MONOCYTES # BLD AUTO: 0.5 10E3/UL (ref 0–1.3)
MONOCYTES NFR BLD AUTO: 11 %
NEUTROPHILS # BLD AUTO: 2.4 10E3/UL (ref 1.6–8.3)
NEUTROPHILS NFR BLD AUTO: 52 %
PLATELET # BLD AUTO: 235 10E3/UL (ref 150–450)
RBC # BLD AUTO: 3.98 10E6/UL (ref 3.8–5.2)
WBC # BLD AUTO: 4.6 10E3/UL (ref 4–11)

## 2023-02-17 PROCEDURE — 36415 COLL VENOUS BLD VENIPUNCTURE: CPT

## 2023-02-17 PROCEDURE — 85025 COMPLETE CBC W/AUTO DIFF WBC: CPT

## 2023-02-19 DIAGNOSIS — D64.9 LOW HEMOGLOBIN: Primary | ICD-10-CM

## 2023-02-19 NOTE — RESULT ENCOUNTER NOTE
Dear Leticia,     Your hemoglobin continues to be slightly low and I recommend an every other day iron supplement (ferrous gluconate 325 mg) and we can then plan to recheck your levels in 3 months after starting the supplement.      Please send a [employer] message or call [phone]  if you have any questions.      Cait Roberts, CATHLEEN, CNP  [facility]    If you have further questions about the interpretation of your labs, [email or web address] is a good website to check out for further information.

## 2023-02-26 ENCOUNTER — NURSE TRIAGE (OUTPATIENT)
Dept: NURSING | Facility: CLINIC | Age: 53
End: 2023-02-26
Payer: COMMERCIAL

## 2023-02-27 NOTE — TELEPHONE ENCOUNTER
"Nurse Triage SBAR    Is this a 2nd Level Triage? YES, LICENSED PRACTITIONER REVIEW IS REQUIRED    Situation: Patient has vomiting and diarrhea.    Background: Her fiance just had it, but has recovered now. Was not as bad as the patient.    Assessment: Patient started vomiting at 0430 this morning. She has vomiting about 15 times. She has diarrhea about every time she vomits as well. She cannot even keep one ice chip down. Vomiting is now green in color. Denies any blood. Headache 6-7/10. She is still urinating, just not as much as normal.     Protocol Recommended Disposition:   Go to ED Now (Or PCP Triage)    Recommendation: Paged to on-call provider.     Spoke with Dr Abdul who recommends the patient goes to the ED for some IV fluids and anti-nausea medication. Rule out dehydration at this point.     Called the patient back and relayed information. Patient verbalized understanding.     Geronimo Hastings RN on 2/26/2023 at 6:55 PM    Reason for Disposition    [1] SEVERE vomiting (e.g., 6 or more times/day) AND [2] present > 8 hours (Exception: patient sounds well, is drinking liquids, does not sound dehydrated, and vomiting has lasted less than 24 hours)    Additional Information    Negative: Shock suspected (e.g., cold/pale/clammy skin, too weak to stand, low BP, rapid pulse)    Negative: Difficult to awaken or acting confused (e.g., disoriented, slurred speech)    Negative: Sounds like a life-threatening emergency to the triager    Negative: [1] Vomiting AND [2] contains red blood or black (\"coffee ground\") material  (Exception: few red streaks in vomit that only happened once)    Negative: Severe pain in one eye    Negative: Recent head injury (within last 3 days)    Negative: Recent abdominal injury (within last 3 days)    Negative: [1] Insulin-dependent diabetes (Type I) AND [2] glucose > 400 mg/dl (22 mmol/l)    Negative: [1] Vomiting AND [2] hernia is more painful or swollen than usual    Protocols used: " VOMITING-A-AH

## 2023-08-07 ENCOUNTER — TELEPHONE (OUTPATIENT)
Dept: ORTHOPEDICS | Facility: CLINIC | Age: 53
End: 2023-08-07

## 2023-08-07 NOTE — TELEPHONE ENCOUNTER
[facility] Call Center    Phone Message    May a detailed message be left on voicemail: yes     Reason for Call: Other: Patient is calling because she has appointment with Dr Yeo on 8/9/23, and would  like to add on cortisone injections in left elbow for tendinitis. Please call patient if she can or cannot get injection at current appointment, patient would like to avoid coming in multiple times.       Action Taken: Other: [id number]    Travel Screening: Not Applicable

## 2023-08-07 NOTE — TELEPHONE ENCOUNTER
Called patient and relayed information below about adding on an injection at the appointment. Patient understood there may or may not be time for both and verbally agreed that as long as both could be addressed, that would be fine.     Patient also mentioned having knee X-ray done at urgent care ~1.5 weeks at [facility] and was curious if those were on file - they were not. I called [place] to have the images released to us.  Informed the patient the provider may still want xrays with different views.    Patient verbalizes agreement and understanding.    Caitlyn Castillo, ATC

## 2023-08-07 NOTE — TELEPHONE ENCOUNTER
Huddled with Nedra to discuss adding on an injection for the patient's left elbow during her visit and evaluation of her knee on 8/9/23. Her elbow has been evaluated by Dr. Yeo previously. It was discussed Dr. Yeo could address both, but there was no guarantee for an injection at the same visit.     Will relay information to patient to let them decide what their goal for the visit would be.    Caitlyn Castillo, ATC

## 2023-08-09 ENCOUNTER — ANCILLARY PROCEDURE (OUTPATIENT)
Dept: GENERAL RADIOLOGY | Facility: CLINIC | Age: 53
End: 2023-08-09
Attending: FAMILY MEDICINE
Payer: COMMERCIAL

## 2023-08-09 ENCOUNTER — OFFICE VISIT (OUTPATIENT)
Dept: ORTHOPEDICS | Facility: CLINIC | Age: 53
End: 2023-08-09
Payer: COMMERCIAL

## 2023-08-09 VITALS
WEIGHT: 173 LBS | BODY MASS INDEX: 31.83 KG/M2 | SYSTOLIC BLOOD PRESSURE: 122 MMHG | HEIGHT: 62 IN | DIASTOLIC BLOOD PRESSURE: 82 MMHG

## 2023-08-09 DIAGNOSIS — M25.562 ACUTE PAIN OF LEFT KNEE: ICD-10-CM

## 2023-08-09 DIAGNOSIS — M25.562 ACUTE PAIN OF LEFT KNEE: Primary | ICD-10-CM

## 2023-08-09 DIAGNOSIS — M77.12 LATERAL EPICONDYLITIS OF LEFT ELBOW: ICD-10-CM

## 2023-08-09 DIAGNOSIS — M17.12 PRIMARY OSTEOARTHRITIS OF LEFT KNEE: ICD-10-CM

## 2023-08-09 PROCEDURE — 73562 X-RAY EXAM OF KNEE 3: CPT | Mod: TC | Performed by: FAMILY MEDICINE

## 2023-08-09 PROCEDURE — 20611 DRAIN/INJ JOINT/BURSA W/US: CPT | Mod: LT | Performed by: FAMILY MEDICINE

## 2023-08-09 PROCEDURE — 76942 ECHO GUIDE FOR BIOPSY: CPT | Mod: 59 | Performed by: FAMILY MEDICINE

## 2023-08-09 PROCEDURE — 20551 NJX 1 TENDON ORIGIN/INSJ: CPT | Mod: LT | Performed by: FAMILY MEDICINE

## 2023-08-09 PROCEDURE — 99213 OFFICE O/P EST LOW 20 MIN: CPT | Mod: 25 | Performed by: FAMILY MEDICINE

## 2023-08-09 RX ORDER — LIDOCAINE HYDROCHLORIDE 10 MG/ML
5 INJECTION, SOLUTION INFILTRATION; PERINEURAL
Status: SHIPPED | OUTPATIENT
Start: 2023-08-09

## 2023-08-09 RX ORDER — METHYLPREDNISOLONE ACETATE 40 MG/ML
40 INJECTION, SUSPENSION INTRA-ARTICULAR; INTRALESIONAL; INTRAMUSCULAR; SOFT TISSUE
Status: SHIPPED | OUTPATIENT
Start: 2023-08-09

## 2023-08-09 RX ORDER — ROPIVACAINE HYDROCHLORIDE 5 MG/ML
4 INJECTION, SOLUTION EPIDURAL; INFILTRATION; PERINEURAL
Status: SHIPPED | OUTPATIENT
Start: 2023-08-09

## 2023-08-09 RX ORDER — ROPIVACAINE HYDROCHLORIDE 5 MG/ML
2 INJECTION, SOLUTION EPIDURAL; INFILTRATION; PERINEURAL
Status: SHIPPED | OUTPATIENT
Start: 2023-08-09

## 2023-08-09 RX ADMIN — ROPIVACAINE HYDROCHLORIDE 4 ML: 5 INJECTION, SOLUTION EPIDURAL; INFILTRATION; PERINEURAL at 08:28

## 2023-08-09 RX ADMIN — LIDOCAINE HYDROCHLORIDE 5 ML: 10 INJECTION, SOLUTION INFILTRATION; PERINEURAL at 08:28

## 2023-08-09 RX ADMIN — METHYLPREDNISOLONE ACETATE 40 MG: 40 INJECTION, SUSPENSION INTRA-ARTICULAR; INTRALESIONAL; INTRAMUSCULAR; SOFT TISSUE at 08:29

## 2023-08-09 RX ADMIN — METHYLPREDNISOLONE ACETATE 40 MG: 40 INJECTION, SUSPENSION INTRA-ARTICULAR; INTRALESIONAL; INTRAMUSCULAR; SOFT TISSUE at 08:28

## 2023-08-09 RX ADMIN — ROPIVACAINE HYDROCHLORIDE 2 ML: 5 INJECTION, SOLUTION EPIDURAL; INFILTRATION; PERINEURAL at 08:29

## 2023-08-09 NOTE — LETTER
8/9/2023         RE: Leticia Aquino  [address] [place] [address]        Dear Colleague,    Thank you for referring your patient, Leticia Aquino, to the [facility] [place]. Please see a copy of my visit note below.    ASSESSMENT & PLAN  Patient Instructions     1. Acute pain of left knee    2. Primary osteoarthritis of left knee    3. Lateral epicondylitis of left elbow      -Patient has acute left knee pain and swelling due to aggravation of arthritis and reaggravation of left elbow pain due to tendinitis  -Patient has been doing a little bit of heavy yard work stressing her knees and elbows significantly  -Patient tolerated left knee intra-articular aspiration and cortisone injection today without complications.  Patient also tolerated left lateral epicondyle cortisone injection today without complications.  Patient was given postprocedure instructions  -Patient will start formal physical therapy and home exercise program for the left knee to strengthen and stabilize her leg  -Patient will continue with home exercises for the left elbow  -Patient should avoid repetitive lifting and activity until cortisone will take effect but she should still be careful with amount of lifting she is performing  -Patient will follow-up if pain returns  -Call direct clinic number [[phone]] at any time with questions or concerns.    Albert Yeo MD [facility] Orthopedics and Sports Medicine  [facility]        -----    SUBJECTIVE  Leticia Aquino is a/an 53 year old female who is seen as a self referral for evaluation of left knee pain. The patient is seen by themselves.    Onset: 6-8 week(s) ago. Reports insidious onset without acute precipitating event. Patient reports that she has been doing a lot of yard work  Location of Pain: left anterior, lateral and medial knee  Rating of Pain at worst: 8/10  Rating of Pain Currently: 7/10  Worsened by:  walking, going down stairs  Better with: rest, brace, ice  Treatments tried: rest/activity avoidance, ice, ibuprofen, and casting/splinting/bracing  Associated symptoms: swelling, popping  Orthopedic history: YES - patient also complains of ongoing left elbow pain. Patient was last seen for left elbow on 23 and had US guided left lateral epicondyle cortisone injection. Patient reports good relief lasting ~ 5 months. She has used a tennis elbow strap, ice and stretching.  Relevant surgical history: NO  Social history: social history: works in office setting    Past Medical History:   Diagnosis Date     Allergic rhinitis, cause unspecified      Dysmenorrhea 2006     Excessive or frequent menstruation 2006    Sees OB-GYN; on OCP Quarterly cycles. Considering Ablation      Iron deficiency anemia secondary to inadequate dietary iron intake 2004     Lateral epicondylitis of both elbows 2022     Premenstrual tension syndromes     Prozac helpful     Unspecified hypothyroidism 3/2004    started low dose replacement; thyroid shad very high     Social History     Socioeconomic History     Marital status:    Tobacco Use     Smoking status: Former     Packs/day: 0.50     Years: 5.00     Pack years: 2.50     Types: Cigarettes     Start date: 1985     Quit date: 1990     Years since quittin.6     Smokeless tobacco: Never     Tobacco comments:     QUIT    Substance and Sexual Activity     Alcohol use: Yes     Comment: 2-3 times per week     Drug use: No     Sexual activity: Yes     Partners: Male     Birth control/protection: Female Surgical     Comment: Vasectomy   Other Topics Concern     Caffeine Concern Yes     Comment: 2 cups and soda daily     Exercise Yes     Comment: 3-4 per week     Parent/sibling w/ CABG, MI or angioplasty before 65F 55M? No   Social History Narrative    Works at [employer]         Patient's past medical, surgical, social, and family histories were  "reviewed today and no changes are noted.    REVIEW OF SYSTEMS:  10 point ROS is negative other than symptoms noted above in HPI, Past Medical History or as stated below  Constitutional: NEGATIVE for fever, chills, change in weight  Skin: NEGATIVE for worrisome rashes, moles or lesions  GI/: NEGATIVE for bowel or bladder changes  Neuro: NEGATIVE for weakness, dizziness or paresthesias    OBJECTIVE:  /82   Ht 1.575 m (5' 2\")   Wt 78.5 kg (173 lb)   LMP 09/13/2012   BMI 31.64 kg/m     General: healthy, alert and in no distress  HEENT: no scleral icterus or conjunctival erythema  Skin: no suspicious lesions or rash. No jaundice.  CV: no pedal edema  Resp: normal respiratory effort without conversational dyspnea   Psych: normal mood and affect  Gait: normal steady gait with appropriate coordination and balance  Neuro: Normal light sensory exam of lower extremity  MSK:  LEFT KNEE  Inspection:    normal alignment  Palpation:    Tender about the lateral patellar facet, lateral joint line, and medial joint line. Remainder of bony and ligamentous landmarks are nontender.    Moderate effusion is present    Patellofemoral crepitus is Present  Range of Motion:     50 extension to 900 flexion  Strength:    Quadriceps grossly intact    Extensor mechanism intact  Special Tests:    Positive: none    Negative: MCL/valgus stress (0 & 30 deg), LCL/varus stress (0 & 30 deg), Lachman's, anterior drawer, posterior drawer, Johnie's    Independent visualization of the below image:  Recent Results (from the past 24 hour(s))   XR Knee Standing AP [place] Bilat Lat Left    Narrative    Mild patellofemoral joint space narrowing with small osteophytes and mild   medial compartment subchondral sclerosis.  No acute fracture or   dislocation.       Large Joint Injection/Arthocentesis: L knee joint    Date/Time: 8/9/2023 8:28 AM    Performed by: Yeo, Albert, MD  Authorized by: Yeo, Albert, MD    Indications:  Pain and " osteoarthritis  Needle Size:  22 G  Guidance: ultrasound    Approach:  Anterolateral  Location:  Knee      Medications:  40 mg methylPREDNISolone 40 MG/ML; 5 mL lidocaine 1 %; 4 mL ROPivacaine 5 MG/ML  Aspirate amount (mL):  17  Aspirate:  Serous and yellow  Outcome:  Tolerated well, no immediate complications  Procedure discussed: discussed risks, benefits, and alternatives    Consent Given by:  Patient  Timeout: timeout called immediately prior to procedure    Prep: patient was prepped and draped in usual sterile fashion     Ultrasound was used to ensure safe and accurate needle placement and injection. Ultrasound images of the procedure were permanently stored.    Hand / Upper Extremity Injection/Arthrocentesis: L elbow    Date/Time: 8/9/2023 8:29 AM    Performed by: Yeo, Albert, MD  Authorized by: Yeo, Albert, MD    Indications:  Pain  Needle Size:  25 G  Guidance: ultrasound    Approach:  Lateral  Condition: epicondylitis, lateral      Site:  L elbow  Medications:  40 mg methylPREDNISolone 40 MG/ML; 2 mL ROPivacaine 5 MG/ML  Outcome:  Tolerated well, no immediate complications  Procedure discussed: discussed risks, benefits, and alternatives    Consent Given by:  Patient  Timeout: timeout called immediately prior to procedure    Prep: patient was prepped and draped in usual sterile fashion     Ultrasound was used to ensure safe and accurate needle placement and injection. Ultrasound images of the procedure were permanently stored.          Albert Yeo MD [facility]      Again, thank you for allowing me to participate in the care of your patient.        Sincerely,        Albert Yeo, MD

## 2023-08-09 NOTE — PATIENT INSTRUCTIONS
1. Acute pain of left knee    2. Primary osteoarthritis of left knee    3. Lateral epicondylitis of left elbow      -Patient has acute left knee pain and swelling due to aggravation of arthritis and reaggravation of left elbow pain due to tendinitis  -Patient has been doing a little bit of heavy yard work stressing her knees and elbows significantly  -Patient tolerated left knee intra-articular aspiration and cortisone injection today without complications.  Patient also tolerated left lateral epicondyle cortisone injection today without complications.  Patient was given postprocedure instructions  -Patient will start formal physical therapy and home exercise program for the left knee to strengthen and stabilize her leg  -Patient will continue with home exercises for the left elbow  -Patient should avoid repetitive lifting and activity until cortisone will take effect but she should still be careful with amount of lifting she is performing  -Patient will follow-up if pain returns  -Call direct clinic number [[phone]] at any time with questions or concerns.    Albert Yeo MD [facility] Orthopedics and Sports Medicine  [facility] [place]

## 2023-08-09 NOTE — PROGRESS NOTES
ASSESSMENT & PLAN  Patient Instructions     1. Acute pain of left knee    2. Primary osteoarthritis of left knee    3. Lateral epicondylitis of left elbow      -Patient has acute left knee pain and swelling due to aggravation of arthritis and reaggravation of left elbow pain due to tendinitis  -Patient has been doing a little bit of heavy yard work stressing her knees and elbows significantly  -Patient tolerated left knee intra-articular aspiration and cortisone injection today without complications.  Patient also tolerated left lateral epicondyle cortisone injection today without complications.  Patient was given postprocedure instructions  -Patient will start formal physical therapy and home exercise program for the left knee to strengthen and stabilize her leg  -Patient will continue with home exercises for the left elbow  -Patient should avoid repetitive lifting and activity until cortisone will take effect but she should still be careful with amount of lifting she is performing  -Patient will follow-up if pain returns  -Call direct clinic number [[phone]] at any time with questions or concerns.    Albert Yeo MD [facility] Orthopedics and Sports Medicine  [facility]        -----    SUBJECTIVE  Leticia Aquino is a/an 53 year old female who is seen as a self referral for evaluation of left knee pain. The patient is seen by themselves.    Onset: 6-8 week(s) ago. Reports insidious onset without acute precipitating event. Patient reports that she has been doing a lot of yard work  Location of Pain: left anterior, lateral and medial knee  Rating of Pain at worst: 8/10  Rating of Pain Currently: 7/10  Worsened by: walking, going down stairs  Better with: rest, brace, ice  Treatments tried: rest/activity avoidance, ice, ibuprofen, and casting/splinting/bracing  Associated symptoms: swelling, popping  Orthopedic history: YES - patient also complains of ongoing left elbow pain. Patient was  last seen for left elbow on 23 and had US guided left lateral epicondyle cortisone injection. Patient reports good relief lasting ~ 5 months. She has used a tennis elbow strap, ice and stretching.  Relevant surgical history: NO  Social history: social history: works in office setting    Past Medical History:   Diagnosis Date    Allergic rhinitis, cause unspecified     Dysmenorrhea 2006    Excessive or frequent menstruation 2006    Sees OB-GYN; on OCP Quarterly cycles. Considering Ablation     Iron deficiency anemia secondary to inadequate dietary iron intake 2004    Lateral epicondylitis of both elbows 2022    Premenstrual tension syndromes     Prozac helpful    Unspecified hypothyroidism 3/2004    started low dose replacement; thyroid shad very high     Social History     Socioeconomic History    Marital status:    Tobacco Use    Smoking status: Former     Packs/day: 0.50     Years: 5.00     Pack years: 2.50     Types: Cigarettes     Start date: 1985     Quit date: 1990     Years since quittin.6    Smokeless tobacco: Never    Tobacco comments:     QUIT    Substance and Sexual Activity    Alcohol use: Yes     Comment: 2-3 times per week    Drug use: No    Sexual activity: Yes     Partners: Male     Birth control/protection: Female Surgical     Comment: Vasectomy   Other Topics Concern    Caffeine Concern Yes     Comment: 2 cups and soda daily    Exercise Yes     Comment: 3-4 per week    Parent/sibling w/ CABG, MI or angioplasty before 65F 55M? No   Social History Narrative    Works at [employer]         Patient's past medical, surgical, social, and family histories were reviewed today and no changes are noted.    REVIEW OF SYSTEMS:  10 point ROS is negative other than symptoms noted above in HPI, Past Medical History or as stated below  Constitutional: NEGATIVE for fever, chills, change in weight  Skin: NEGATIVE for worrisome rashes, moles or lesions  GI/:  "NEGATIVE for bowel or bladder changes  Neuro: NEGATIVE for weakness, dizziness or paresthesias    OBJECTIVE:  /82   Ht 1.575 m (5' 2\")   Wt 78.5 kg (173 lb)   LMP 09/13/2012   BMI 31.64 kg/m     General: healthy, alert and in no distress  HEENT: no scleral icterus or conjunctival erythema  Skin: no suspicious lesions or rash. No jaundice.  CV: no pedal edema  Resp: normal respiratory effort without conversational dyspnea   Psych: normal mood and affect  Gait: normal steady gait with appropriate coordination and balance  Neuro: Normal light sensory exam of lower extremity  MSK:  LEFT KNEE  Inspection:    normal alignment  Palpation:    Tender about the lateral patellar facet, lateral joint line, and medial joint line. Remainder of bony and ligamentous landmarks are nontender.    Moderate effusion is present    Patellofemoral crepitus is Present  Range of Motion:     50 extension to 900 flexion  Strength:    Quadriceps grossly intact    Extensor mechanism intact  Special Tests:    Positive: none    Negative: MCL/valgus stress (0 & 30 deg), LCL/varus stress (0 & 30 deg), Lachman's, anterior drawer, posterior drawer, Johnie's    Independent visualization of the below image:  Recent Results (from the past 24 hour(s))   XR Knee Standing AP [place] Bilat Lat Left    Narrative    Mild patellofemoral joint space narrowing with small osteophytes and mild   medial compartment subchondral sclerosis.  No acute fracture or   dislocation.       Large Joint Injection/Arthocentesis: L knee joint    Date/Time: 8/9/2023 8:28 AM    Performed by: Yeo, Albert, MD  Authorized by: Yeo, Albert, MD    Indications:  Pain and osteoarthritis  Needle Size:  22 G  Guidance: ultrasound    Approach:  Anterolateral  Location:  Knee      Medications:  40 mg methylPREDNISolone 40 MG/ML; 5 mL lidocaine 1 %; 4 mL ROPivacaine 5 MG/ML  Aspirate amount (mL):  17  Aspirate:  Serous and yellow  Outcome:  Tolerated well, no immediate " complications  Procedure discussed: discussed risks, benefits, and alternatives    Consent Given by:  Patient  Timeout: timeout called immediately prior to procedure    Prep: patient was prepped and draped in usual sterile fashion     Ultrasound was used to ensure safe and accurate needle placement and injection. Ultrasound images of the procedure were permanently stored.    Hand / Upper Extremity Injection/Arthrocentesis: L elbow    Date/Time: 8/9/2023 8:29 AM    Performed by: Yeo, Albert, MD  Authorized by: Yeo, Albert, MD    Indications:  Pain  Needle Size:  25 G  Guidance: ultrasound    Approach:  Lateral  Condition: epicondylitis, lateral      Site:  L elbow  Medications:  40 mg methylPREDNISolone 40 MG/ML; 2 mL ROPivacaine 5 MG/ML  Outcome:  Tolerated well, no immediate complications  Procedure discussed: discussed risks, benefits, and alternatives    Consent Given by:  Patient  Timeout: timeout called immediately prior to procedure    Prep: patient was prepped and draped in usual sterile fashion     Ultrasound was used to ensure safe and accurate needle placement and injection. Ultrasound images of the procedure were permanently stored.          Albert Yeo MD [facility]

## 2023-12-18 DIAGNOSIS — E78.5 HYPERLIPIDEMIA LDL GOAL <130: ICD-10-CM

## 2023-12-18 DIAGNOSIS — N94.3 PREMENSTRUAL TENSION SYNDROME: ICD-10-CM

## 2023-12-18 RX ORDER — ATORVASTATIN CALCIUM 40 MG/1
TABLET, FILM COATED ORAL
Qty: 90 TABLET | Refills: 0 | Status: SHIPPED | OUTPATIENT
Start: 2023-12-18 | End: 2024-03-28

## 2023-12-18 RX ORDER — FLUOXETINE 40 MG/1
CAPSULE ORAL
Qty: 90 CAPSULE | Refills: 0 | Status: SHIPPED | OUTPATIENT
Start: 2023-12-18 | End: 2024-04-09

## 2023-12-26 ENCOUNTER — PATIENT OUTREACH (OUTPATIENT)
Dept: CARE COORDINATION | Facility: CLINIC | Age: 53
End: 2023-12-26
Payer: COMMERCIAL

## 2023-12-27 ENCOUNTER — VIRTUAL VISIT (OUTPATIENT)
Dept: FAMILY MEDICINE | Facility: CLINIC | Age: 53
End: 2023-12-27
Payer: COMMERCIAL

## 2023-12-27 DIAGNOSIS — R05.3 PERSISTENT COUGH: Primary | ICD-10-CM

## 2023-12-27 PROCEDURE — 99213 OFFICE O/P EST LOW 20 MIN: CPT | Mod: 95 | Performed by: PHYSICIAN ASSISTANT

## 2023-12-27 RX ORDER — DOXYCYCLINE HYCLATE 100 MG
100 TABLET ORAL 2 TIMES DAILY
Qty: 20 TABLET | Refills: 0 | Status: SHIPPED | OUTPATIENT
Start: 2023-12-27 | End: 2024-04-09

## 2023-12-27 ASSESSMENT — ENCOUNTER SYMPTOMS
SINUS PAIN: 0
SHORTNESS OF BREATH: 0
COUGH: 1
SINUS PRESSURE: 0
FEVER: 0
RHINORRHEA: 1

## 2023-12-27 NOTE — PROGRESS NOTES
"Leticia is a 53 year old who is being evaluated via a billable telephone visit.      What phone number would you like to be contacted at? [phone]  How would you like to obtain your AVS? Zechariah    Distant Location (provider location):  On-site    Assessment & Plan     Persistent cough  Cover with doxycycline push fluids plenty of rest her cough should be significantly better by the end of the course of antibiotics and not worse than prior she will recheck  - doxycycline hyclate (VIBRA-TABS) 100 MG tablet  Dispense: 20 tablet; Refill: 0               BMI:   Estimated body mass index is 31.64 kg/m  as calculated from the following:    Height as of 8/9/23: 1.575 m (5' 2\").    Weight as of 8/9/23: 78.5 kg (173 lb).           GEOVANNA De La Cruz  [facility]    Subjective   Leticia is a 53 year old, presenting for the following health issues:  Cough      12/27/2023    11:02 AM   Additional Questions   Roomed by LA       53-year-old presents virtually to discuss cough.  She has had a cough since end of November she did have COVID  Cough just kind of persisted now is worse and its productive at times of mucopurulent sputum no significant shortness of breath no chest tightness  She does have some rhinorrhea and some nasal congestion the rhinorrhea has been clear she does not have sinus pressure  She has not had fevers  Her fiancé has had some respiratory symptoms as well no other infectious disease exposure that she is aware of    History of Present Illness       Reason for visit:  Ongoing cough since November  Symptom onset:  More than a month  Symptoms include:  Cough stuffy nose or runny nose horse voice  Symptom intensity:  Moderate  Symptom progression:  Worsening  What makes it worse:  Activity  What makes it better:  Sleeping propped up    She eats 0-1 servings of fruits and vegetables daily.She consumes 0 sweetened beverage(s) daily.She exercises with enough effort to increase her " heart rate 9 or less minutes per day.  She exercises with enough effort to increase her heart rate 3 or less days per week.   She is taking medications regularly.               Review of Systems   Constitutional:  Negative for fever.   HENT:  Positive for rhinorrhea. Negative for sinus pressure and sinus pain.    Respiratory:  Positive for cough. Negative for shortness of breath.             Objective           Vitals:  No vitals were obtained today due to virtual visit.    Physical Exam   healthy, alert, and no distress  PSYCH: Alert and oriented times 3; coherent speech, normal   rate and volume, able to articulate logical thoughts, able   to abstract reason, no tangential thoughts, no hallucinations   or delusions  Her affect is normal  RESP: No cough, no audible wheezing, able to talk in full sentences  Remainder of exam unable to be completed due to telephone visits                Phone call duration: 5 minutes

## 2024-01-04 ENCOUNTER — PATIENT OUTREACH (OUTPATIENT)
Dept: CARE COORDINATION | Facility: CLINIC | Age: 54
End: 2024-01-04
Payer: COMMERCIAL

## 2024-01-18 ENCOUNTER — PATIENT OUTREACH (OUTPATIENT)
Dept: CARE COORDINATION | Facility: CLINIC | Age: 54
End: 2024-01-18
Payer: COMMERCIAL

## 2024-01-23 ENCOUNTER — PATIENT OUTREACH (OUTPATIENT)
Dept: CARE COORDINATION | Facility: CLINIC | Age: 54
End: 2024-01-23
Payer: COMMERCIAL

## 2024-02-15 ENCOUNTER — LAB (OUTPATIENT)
Dept: LAB | Facility: CLINIC | Age: 54
End: 2024-02-15
Payer: COMMERCIAL

## 2024-02-15 ENCOUNTER — ANCILLARY PROCEDURE (OUTPATIENT)
Dept: MAMMOGRAPHY | Facility: CLINIC | Age: 54
End: 2024-02-15
Payer: COMMERCIAL

## 2024-02-15 DIAGNOSIS — Z12.31 VISIT FOR SCREENING MAMMOGRAM: ICD-10-CM

## 2024-02-15 DIAGNOSIS — D64.9 LOW HEMOGLOBIN: ICD-10-CM

## 2024-02-15 LAB
ERYTHROCYTE [DISTWIDTH] IN BLOOD BY AUTOMATED COUNT: 12.3 % (ref 10–15)
FERRITIN SERPL-MCNC: 64 NG/ML (ref 11–328)
HCT VFR BLD AUTO: 40.7 % (ref 35–47)
HGB BLD-MCNC: 14 G/DL (ref 11.7–15.7)
MCH RBC QN AUTO: 34.6 PG (ref 26.5–33)
MCHC RBC AUTO-ENTMCNC: 34.4 G/DL (ref 31.5–36.5)
MCV RBC AUTO: 101 FL (ref 78–100)
PLATELET # BLD AUTO: 258 10E3/UL (ref 150–450)
RBC # BLD AUTO: 4.05 10E6/UL (ref 3.8–5.2)
WBC # BLD AUTO: 4.3 10E3/UL (ref 4–11)

## 2024-02-15 PROCEDURE — 36415 COLL VENOUS BLD VENIPUNCTURE: CPT

## 2024-02-15 PROCEDURE — 82728 ASSAY OF FERRITIN: CPT

## 2024-02-15 PROCEDURE — 77063 BREAST TOMOSYNTHESIS BI: CPT | Mod: TC | Performed by: RADIOLOGY

## 2024-02-15 PROCEDURE — 77067 SCR MAMMO BI INCL CAD: CPT | Mod: TC | Performed by: RADIOLOGY

## 2024-02-15 PROCEDURE — 85027 COMPLETE CBC AUTOMATED: CPT

## 2024-02-16 NOTE — RESULT ENCOUNTER NOTE
Blanche Kelly,     -Normal red blood cell (hgb) levels, normal white blood cell count and normal platelet levels.  -Ferritin (iron) level is normal.      Thank you,     CATHLEEN Schwab, CNP  [facility] [place]

## 2024-02-21 ENCOUNTER — HOSPITAL ENCOUNTER (OUTPATIENT)
Dept: ULTRASOUND IMAGING | Facility: CLINIC | Age: 54
Discharge: HOME | End: 2024-02-21
Attending: INTERNAL MEDICINE
Payer: COMMERCIAL

## 2024-02-21 DIAGNOSIS — R92.8 ABNORMAL MAMMOGRAM: ICD-10-CM

## 2024-02-21 PROCEDURE — 76642 ULTRASOUND BREAST LIMITED: CPT | Mod: RT

## 2024-02-22 DIAGNOSIS — E03.4 HYPOTHYROIDISM DUE TO ACQUIRED ATROPHY OF THYROID: ICD-10-CM

## 2024-02-22 RX ORDER — LEVOTHYROXINE SODIUM 100 MCG
100 TABLET ORAL DAILY
Qty: 90 TABLET | Refills: 0 | Status: SHIPPED | OUTPATIENT
Start: 2024-02-22 | End: 2024-04-09

## 2024-02-22 NOTE — LETTER
February 29, 2024      Leticia Aquino  [address] [place] [address]        Dear Leticia,       We received a refill request from your pharmacy for your  medication.  At this time the nurses were able to give you a michael refill, but you are due to be seen for a physical, medication review and fasting labs before the next refill.  This appointment can be scheduled by calling [phone] or can be scheduled via [facility] as well.      Sincerely,  [facility] Care Team    Cait Roberts, CATHLEEN, CNP

## 2024-03-17 ENCOUNTER — HEALTH MAINTENANCE LETTER (OUTPATIENT)
Age: 54
End: 2024-03-17

## 2024-03-28 DIAGNOSIS — E78.5 HYPERLIPIDEMIA LDL GOAL <130: ICD-10-CM

## 2024-03-28 RX ORDER — ATORVASTATIN CALCIUM 40 MG/1
40 TABLET, FILM COATED ORAL DAILY
Qty: 90 TABLET | Refills: 0 | Status: SHIPPED | OUTPATIENT
Start: 2024-03-28 | End: 2024-04-09

## 2024-03-28 NOTE — TELEPHONE ENCOUNTER
Medication Question or Refill        What medication are you calling about (include dose and sig)?:     atorvastatin (LIPITOR) 40 MG tablet       Preferred Pharmacy:       [facility] - [place], [place] - [address]  [address] [address]  Phone: [phone] Fax: [phone] Alternate Fax: [phone], [phone]      Controlled Substance Agreement on file:   CSA -- Patient Level:    CSA: None found at the patient level.       Who prescribed the medication?: Meixl    Do you need a refill? Yes    When did you use the medication last? na    Patient offered an appointment? No    Do you have any questions or concerns?  No      Could we send this information to you in [facility] or would you prefer to receive a phone call?:   Patient would prefer a phone call   Okay to leave a detailed message?: Yes at Cell number on file:    Telephone Information:   Mobile [phone]     Tonia DHALIWAL

## 2024-04-08 SDOH — HEALTH STABILITY: PHYSICAL HEALTH: ON AVERAGE, HOW MANY MINUTES DO YOU ENGAGE IN EXERCISE AT THIS LEVEL?: 20 MIN

## 2024-04-08 SDOH — HEALTH STABILITY: PHYSICAL HEALTH: ON AVERAGE, HOW MANY DAYS PER WEEK DO YOU ENGAGE IN MODERATE TO STRENUOUS EXERCISE (LIKE A BRISK WALK)?: 2 DAYS

## 2024-04-08 ASSESSMENT — SOCIAL DETERMINANTS OF HEALTH (SDOH): HOW OFTEN DO YOU GET TOGETHER WITH FRIENDS OR RELATIVES?: THREE TIMES A WEEK

## 2024-04-09 ENCOUNTER — OFFICE VISIT (OUTPATIENT)
Dept: FAMILY MEDICINE | Facility: CLINIC | Age: 54
End: 2024-04-09
Payer: COMMERCIAL

## 2024-04-09 VITALS
SYSTOLIC BLOOD PRESSURE: 138 MMHG | HEART RATE: 100 BPM | OXYGEN SATURATION: 98 % | BODY MASS INDEX: 34.41 KG/M2 | HEIGHT: 62 IN | RESPIRATION RATE: 18 BRPM | TEMPERATURE: 97.8 F | WEIGHT: 187 LBS | DIASTOLIC BLOOD PRESSURE: 90 MMHG

## 2024-04-09 DIAGNOSIS — B00.1 RECURRENT COLD SORES: ICD-10-CM

## 2024-04-09 DIAGNOSIS — Z13.0 SCREENING FOR DEFICIENCY ANEMIA: ICD-10-CM

## 2024-04-09 DIAGNOSIS — N94.3 PREMENSTRUAL TENSION SYNDROME: ICD-10-CM

## 2024-04-09 DIAGNOSIS — Z00.00 ROUTINE GENERAL MEDICAL EXAMINATION AT A HEALTH CARE FACILITY: Primary | ICD-10-CM

## 2024-04-09 DIAGNOSIS — Z13.1 SCREENING FOR DIABETES MELLITUS: ICD-10-CM

## 2024-04-09 DIAGNOSIS — Z12.4 CERVICAL CANCER SCREENING: ICD-10-CM

## 2024-04-09 DIAGNOSIS — R03.0 ELEVATED BLOOD PRESSURE READING WITHOUT DIAGNOSIS OF HYPERTENSION: ICD-10-CM

## 2024-04-09 DIAGNOSIS — N95.1 MENOPAUSAL SYNDROME (HOT FLASHES): ICD-10-CM

## 2024-04-09 DIAGNOSIS — E78.5 HYPERLIPIDEMIA LDL GOAL <130: ICD-10-CM

## 2024-04-09 DIAGNOSIS — Z87.898 H/O MOTION SICKNESS: ICD-10-CM

## 2024-04-09 DIAGNOSIS — E03.4 HYPOTHYROIDISM DUE TO ACQUIRED ATROPHY OF THYROID: ICD-10-CM

## 2024-04-09 PROCEDURE — 99214 OFFICE O/P EST MOD 30 MIN: CPT | Mod: 25 | Performed by: NURSE PRACTITIONER

## 2024-04-09 PROCEDURE — G0145 SCR C/V CYTO,THINLAYER,RESCR: HCPCS | Performed by: NURSE PRACTITIONER

## 2024-04-09 PROCEDURE — 99396 PREV VISIT EST AGE 40-64: CPT | Performed by: NURSE PRACTITIONER

## 2024-04-09 PROCEDURE — 87624 HPV HI-RISK TYP POOLED RSLT: CPT | Performed by: NURSE PRACTITIONER

## 2024-04-09 RX ORDER — SCOLOPAMINE TRANSDERMAL SYSTEM 1 MG/1
1 PATCH, EXTENDED RELEASE TRANSDERMAL
Qty: 4 PATCH | Refills: 0 | Status: SHIPPED | OUTPATIENT
Start: 2024-04-09 | End: 2024-04-09

## 2024-04-09 RX ORDER — LEVOTHYROXINE SODIUM 100 UG/1
100 TABLET ORAL DAILY
Qty: 90 TABLET | Refills: 3 | Status: SHIPPED | OUTPATIENT
Start: 2024-04-09 | End: 2024-04-09

## 2024-04-09 RX ORDER — FLUOXETINE 40 MG/1
40 CAPSULE ORAL DAILY
Qty: 90 CAPSULE | Refills: 3 | Status: SHIPPED | OUTPATIENT
Start: 2024-04-09

## 2024-04-09 RX ORDER — ATORVASTATIN CALCIUM 40 MG/1
40 TABLET, FILM COATED ORAL DAILY
Qty: 90 TABLET | Refills: 3 | Status: SHIPPED | OUTPATIENT
Start: 2024-04-09 | End: 2024-04-09

## 2024-04-09 RX ORDER — ACYCLOVIR 200 MG/1
200 CAPSULE ORAL
Qty: 25 CAPSULE | Refills: 1 | Status: SHIPPED | OUTPATIENT
Start: 2024-04-09

## 2024-04-09 RX ORDER — ATORVASTATIN CALCIUM 40 MG/1
40 TABLET, FILM COATED ORAL DAILY
Qty: 90 TABLET | Refills: 3 | Status: SHIPPED | OUTPATIENT
Start: 2024-04-09

## 2024-04-09 RX ORDER — FLUOXETINE 40 MG/1
40 CAPSULE ORAL DAILY
Qty: 90 CAPSULE | Refills: 3 | Status: SHIPPED | OUTPATIENT
Start: 2024-04-09 | End: 2024-04-09

## 2024-04-09 RX ORDER — SCOLOPAMINE TRANSDERMAL SYSTEM 1 MG/1
1 PATCH, EXTENDED RELEASE TRANSDERMAL
Qty: 4 PATCH | Refills: 0 | Status: SHIPPED | OUTPATIENT
Start: 2024-04-09

## 2024-04-09 RX ORDER — LEVOTHYROXINE SODIUM 100 UG/1
100 TABLET ORAL DAILY
Qty: 90 TABLET | Refills: 3 | Status: SHIPPED | OUTPATIENT
Start: 2024-04-09

## 2024-04-09 RX ORDER — ACYCLOVIR 200 MG/1
200 CAPSULE ORAL
Qty: 25 CAPSULE | Refills: 1 | Status: SHIPPED | OUTPATIENT
Start: 2024-04-09 | End: 2024-04-09

## 2024-04-09 NOTE — PROGRESS NOTES
Preventive Care Visit  [facility] [place]  Cait DAVID Roberts, CATHLEEN CNP, Family Medicine  Apr 9, 2024    Assessment & Plan     Routine general medical examination at a healthcare facility    Screening for deficiency anemia  History of low hemoglobin.  - CBC with platelets    Screening for diabetes mellitus  - COMPREHENSIVE METABOLIC PANEL    Cervical cancer screening  - Pap Screen with HPV - recommended age 30 - 65 years    Hyperlipidemia LDL goal <130  Recent Labs   Lab Test 01/18/23  0748 11/26/21  0959   CHOL 228* 193   HDL 80 96   * 93   TRIG 56 21   Due for recheck of fasting, lipid panel.    - Lipid panel reflex to direct LDL Non-fasting  - atorvastatin (LIPITOR) 40 MG tablet  Dispense: 90 tablet; Refill: 3    Hypothyroidism due to acquired atrophy of thyroid  TSH   Date Value Ref Range Status   01/18/2023 1.44 0.30 - 4.20 uIU/mL Final   11/26/2021 1.19 0.40 - 4.00 mU/L Final   09/29/2020 2.40 0.40 - 4.00 mU/L Final   05/08/2020 2.48 0.40 - 4.00 mU/L Final   03/14/2019 1.87 0.40 - 4.00 mU/L Final   06/06/2018 1.82 0.40 - 4.00 mU/L Final   04/27/2017 3.20 0.40 - 4.00 mU/L Final   Stable on Levothyroxine 100 mcg daily.    - TSH WITH FREE T4 REFLEX  - levothyroxine (SYNTHROID) 100 MCG tablet  Dispense: 90 tablet; Refill: 3    Menopausal syndrome (hot flashes)  Increase in number of hot flashes, discussed risk and benefits of hormonal therapy as she is already on a SSRI, since Leticia is post-hysterectomy she can consider estrogen only treatment. No personal hx of breast cancer, DVT, or current smoking. Discussed herbal supplements as an alternative option. At this time she will consider and reach out if she is interested in HRT.     Premenstrual tension syndrome  Stable, refilled.   - FLUoxetine (PROZAC) 40 MG capsule  Dispense: 90 capsule; Refill: 3    Recurrent cold sores  Stable, refilled.   - acyclovir (ZOVIRAX) 200 MG capsule  Dispense: 25 capsule; Refill: 1    H/O motion sickness  Used  "successfully in the past, education provided to remove patch before applying a new one.   - scopolamine (TRANSDERM) 1 MG/3DAYS 72 hr patch  Dispense: 4 patch; Refill: 0    Elevated blood pressure reading without diagnosis of hypertension  Elevated in clinic and on re-check, patient has a home monitor and will watch for the next 4 weeks if readings above 140/90s schedule a follow up appointment.         BMI  Estimated body mass index is 34.2 kg/m  as calculated from the following:    Height as of this encounter: 1.575 m (5' 2\").    Weight as of this encounter: 84.8 kg (187 lb).     Counseling  Appropriate preventive services were discussed with this patient.  Checklist reviewing preventive services available has been given to the patient.    Return in about 1 year (around 4/9/2025) for Preventative Visit .      Yvon Kelly is a 53 year old, presenting for the following:  Physical        4/9/2024     2:06 PM   Additional Questions   Roomed by Batsheva CHOPRA        Health Care Directive  Patient does not have a Health Care Directive or Living Will.    HPI    Discuss motion sickness medication- going fishing in [place] for 3 days, has used scopolamine in the past successfully.     Discuss Menopausal symptoms - hot flashes, there was a period where she was having them nightly. Had them on and off throughout the day had sweating as well. In Feb and March felt extreme, 3-4 night waking up, recently improved but concerned they will return.     Hyperlipidemia Follow-Up  Recent Labs   Lab Test 01/18/23  0748 11/26/21  0959   CHOL 228* 193   HDL 80 96   * 93   TRIG 56 21       Are you regularly taking any medication or supplement to lower your cholesterol?   Yes- Atorvastatin  Are you having muscle aches or other side effects that you think could be caused by your cholesterol lowering medication?  No    Hypothyroidism Follow-up  TSH   Date Value Ref Range Status   01/18/2023 1.44 0.30 - 4.20 uIU/mL Final   11/26/2021 " 1.19 0.40 - 4.00 mU/L Final   09/29/2020 2.40 0.40 - 4.00 mU/L Final   05/08/2020 2.48 0.40 - 4.00 mU/L Final   03/14/2019 1.87 0.40 - 4.00 mU/L Final   06/06/2018 1.82 0.40 - 4.00 mU/L Final   04/27/2017 3.20 0.40 - 4.00 mU/L Final     Since last visit, patient describes the following symptoms: weight gain of 20 lbs, tremors, fatigue, and hair loss        4/8/2024   General Health   How would you rate your overall physical health? (!) FAIR   Feel stress (tense, anxious, or unable to sleep) To some extent   (!) STRESS CONCERN      4/8/2024   Nutrition   Three or more servings of calcium each day? (!) I DON'T KNOW   Diet: Regular (no restrictions)   How many servings of fruit and vegetables per day? (!) 0-1   How many sweetened beverages each day? 0-1         4/8/2024   Exercise   Days per week of moderate/strenous exercise 2 days   Average minutes spent exercising at this level 20 min   (!) EXERCISE CONCERN      4/8/2024   Social Factors   Frequency of gathering with friends or relatives Three times a week   Worry food won't last until get money to buy more No   Food not last or not have enough money for food? No   Do you have housing?  Yes   Are you worried about losing your housing? No   Lack of transportation? No   Unable to get utilities (heat,electricity)? No         4/8/2024   Fall Risk   Fallen 2 or more times in the past year? No   Trouble with walking or balance? No          4/8/2024   Dental   Dentist two times every year? Yes         4/8/2024   TB Screening   Were you born outside of the [place]? No         Today's PHQ-2 Score:       4/8/2024     5:47 PM   PHQ-2 ( 1999 [employer])   Q1: Little interest or pleasure in doing things 0   Q2: Feeling down, depressed or hopeless 0   PHQ-2 Score 0   Q1: Little interest or pleasure in doing things Not at all   Q2: Feeling down, depressed or hopeless Not at all   PHQ-2 Score 0           4/8/2024   Substance Use   Alcohol more than 3/day or more than 7/wk No   Do you use  any other substances recreationally? No     Social History     Tobacco Use    Smoking status: Former     Current packs/day: 0.00     Average packs/day: 0.5 packs/day for 5.0 years (2.5 ttl pk-yrs)     Types: Cigarettes     Start date: 1985     Quit date: 1990     Years since quittin.3    Smokeless tobacco: Never    Tobacco comments:     QUIT    Substance Use Topics    Alcohol use: Yes     Comment: 2-3 times per week    Drug use: No         2/15/2024   LAST FHS-7 RESULTS   1st degree relative breast or ovarian cancer Yes   Any relative bilateral breast cancer No   Any male have breast cancer No   Any ONE woman have BOTH breast AND ovarian cancer No   Any woman with breast cancer before 50yrs No   2 or more relatives with breast AND/OR ovarian cancer No   2 or more relatives with breast AND/OR bowel cancer No     Mammogram Screening - Mammogram every 1-2 years updated in Health Maintenance based on mutual decision making        2024   STI Screening   New sexual partner(s) since last STI/HIV test? No     History of abnormal Pap smear: NO - age 30-65 PAP every 5 years with negative HPV co-testing recommended        Latest Ref Rng & Units 2024     3:07 PM 3/15/2019     2:23 PM 3/15/2019     2:17 PM   PAP / HPV   PAP  Negative for Intraepithelial Lesion or Malignancy (NILM)      PAP (Historical)   NIL     HPV 16 DNA NEG^Negative   Negative    HPV 18 DNA NEG^Negative   Negative    Other HR HPV NEG^Negative   Negative      ASCVD Risk   The 10-year ASCVD risk score (Edgar CASTELLON, et al., 2019) is: 1.5%    Values used to calculate the score:      Age: 53 years      Sex: Female      Is Non- : No      Diabetic: No      Tobacco smoker: No      Systolic Blood Pressure: 138 mmHg      Is BP treated: No      HDL Cholesterol: 80 mg/dL      Total Cholesterol: 228 mg/dL      Reviewed and updated as needed this visit by Provider                    BP Readings from Last 3 Encounters:    24 (!) 138/90   23 122/82   23 126/74    Wt Readings from Last 3 Encounters:   24 84.8 kg (187 lb)   23 78.5 kg (173 lb)   23 77.1 kg (170 lb)                  Patient Active Problem List   Diagnosis    Premenstrual tension syndrome    CARDIOVASCULAR SCREENING; LDL GOAL LESS THAN 160    Pain in joint, ankle and foot    Hypothyroidism due to acquired atrophy of thyroid    Hyperlipidemia LDL goal <130    Family history of breast cancer in female - maternal half sister age 50    Recurrent cold sores    Lateral epicondylitis of both elbows     Past Surgical History:   Procedure Laterality Date    GYN SURGERY  2011    ablation    HERNIA REPAIR  1975    HYSTERECTOMY SUPRACERVICAL  2012    partial    ZZC NONSPECIFIC PROCEDURE  1973    Right Inguinal Hernia Repair    ZZC NONSPECIFIC PROCEDURE  1995    D&C for Spontaneous        Social History     Tobacco Use    Smoking status: Former     Current packs/day: 0.00     Average packs/day: 0.5 packs/day for 5.0 years (2.5 ttl pk-yrs)     Types: Cigarettes     Start date: 1985     Quit date: 1990     Years since quittin.3    Smokeless tobacco: Never    Tobacco comments:     QUIT    Substance Use Topics    Alcohol use: Yes     Comment: 2-3 times per week     Family History   Problem Relation Age of Onset    Thyroid Disease Mother         Hypothyroid    Hypertension Mother     Hyperlipidemia Mother     Cerebrovascular Disease Mother 70        TIA - former smoker    Hypertension Father     Diabetes Father         Insulin Dependent    Cardiovascular Father         Triple by-pass in [phone]    Coronary Artery Disease Father     Cancer Maternal Grandfather     Thyroid Disease Daughter         Dx at age 10    Thyroid Disease Son     Colon Cancer Maternal Half-Brother     Prostate Cancer Maternal Half-Brother     Breast Cancer Maternal Half-Sister 50    Colon Cancer No family hx of     Osteoporosis No  "family hx of          Current Outpatient Medications   Medication Sig Dispense Refill    acyclovir (ZOVIRAX) 200 MG capsule Take 1 capsule (200 mg) by mouth 5 times daily (May use as needed for cold sores) 25 capsule 1    atorvastatin (LIPITOR) 40 MG tablet Take 1 tablet (40 mg) by mouth daily 90 tablet 3    FLUoxetine (PROZAC) 40 MG capsule Take 1 capsule (40 mg) by mouth daily 90 capsule 3    levothyroxine (SYNTHROID) 100 MCG tablet Take 1 tablet (100 mcg) by mouth daily 90 tablet 3    scopolamine (TRANSDERM) 1 MG/3DAYS 72 hr patch Place 1 patch onto the skin every 72 hours 4 patch 0         Review of Systems  Constitutional, HEENT, cardiovascular, pulmonary, gi and gu systems are negative, except as otherwise noted.     Objective    Exam  BP (!) 138/90   Pulse 100   Temp 97.8  F (36.6  C)   Resp 18   Ht 1.575 m (5' 2\")   Wt 84.8 kg (187 lb)   LMP 09/13/2012   SpO2 98%   BMI 34.20 kg/m     Estimated body mass index is 34.2 kg/m  as calculated from the following:    Height as of this encounter: 1.575 m (5' 2\").    Weight as of this encounter: 84.8 kg (187 lb).    Physical Exam  GENERAL: alert and no distress  EYES: Eyes grossly normal to inspection, PERRL and conjunctivae and sclerae normal  HENT: ear canals and TM's normal, nose and mouth without ulcers or lesions  NECK: no adenopathy, no asymmetry, masses, or scars  RESP: lungs clear to auscultation - no rales, rhonchi or wheezes  CV: regular rate and rhythm, normal S1 S2, no S3 or S4, no murmur, click or rub  ABDOMEN: soft, nontender, no hepatosplenomegaly, no masses and bowel sounds normal   (female): normal female external genitalia, normal urethral meatus, normal vaginal mucosa, and normal cervix/adnexa/uterus without masses or discharge  MS: no gross musculoskeletal defects noted, no edema  SKIN: no suspicious lesions or rashes  NEURO: Normal strength and tone, mentation intact and speech normal  PSYCH: mentation appears normal, affect " normal/bright      Ale Molina DNP Student at the [facility]      I was present with the student who participated in the service and in the documentation of the note, which I have reviewed and verified. The history, reviews of systems, objective data, and assessment/plan were completed by myself.      Signed Electronically by: CATHLEEN Schwab CNP

## 2024-04-09 NOTE — PATIENT INSTRUCTIONS
Preventive Care Advice   This is general advice given by our system to help you stay healthy. However, your care team may have specific advice just for you. Please talk to your care team about your preventive care needs.  Nutrition  Eat 5 or more servings of fruits and vegetables each day.  Try wheat bread, brown rice and whole grain pasta (instead of white bread, rice, and pasta).  Get enough calcium and vitamin D. Check the label on foods and aim for 100% of the RDA (recommended daily allowance).  Lifestyle  Exercise at least 150 minutes each week   (30 minutes a day, 5 days a week).  Do muscle strengthening activities 2 days a week. These help control your weight and prevent disease.  No smoking.  Wear sunscreen to prevent skin cancer.  Have a dental exam and cleaning every 6 months.  Yearly exams  See your health care team every year to talk about:  Any changes in your health.  Any medicines your care team has prescribed.  Preventive care, family planning, and ways to prevent chronic diseases.  Shots (vaccines)   HPV shots (up to age 26), if you've never had them before.  Hepatitis B shots (up to age 59), if you've never had them before.  COVID-19 shot: Get this shot when it's due.  Flu shot: Get a flu shot every year.  Tetanus shot: Get a tetanus shot every 10 years.  Pneumococcal, hepatitis A, and RSV shots: Ask your care team if you need these based on your risk.  Shingles shot (for age 50 and up).  General health tests  Diabetes screening:  Starting at age 35, Get screened for diabetes at least every 3 years.  If you are younger than age 35, ask your care team if you should be screened for diabetes.  Cholesterol test: At age 39, start having a cholesterol test every 5 years, or more often if advised.  Bone density scan (DEXA): At age 50, ask your care team if you should have this scan for osteoporosis (brittle bones).  Hepatitis C: Get tested at least once in your life.  STIs (sexually transmitted  infections)  Before age 24: Ask your care team if you should be screened for STIs.  After age 24: Get screened for STIs if you're at risk. You are at risk for STIs (including HIV) if:  You are sexually active with more than one person.  You don't use condoms every time.  You or a partner was diagnosed with a sexually transmitted infection.  If you are at risk for HIV, ask about PrEP medicine to prevent HIV.  Get tested for HIV at least once in your life, whether you are at risk for HIV or not.  Cancer screening tests  Cervical cancer screening: If you have a cervix, begin getting regular cervical cancer screening tests at age 21. Most people who have regular screenings with normal results can stop after age 65. Talk about this with your provider.  Breast cancer scan (mammogram): If you've ever had breasts, begin having regular mammograms starting at age 40. This is a scan to check for breast cancer.  Colon cancer screening: It is important to start screening for colon cancer at age 45.  Have a colonoscopy test every 10 years (or more often if you're at risk) Or, ask your provider about stool tests like a FIT test every year or Cologuard test every 3 years.  To learn more about your testing options, visit: https://www.OneSpot/417505.pdf.  For help making a decision, visit: https://bit.ly/ii01585.  Prostate cancer screening test: If you have a prostate and are age 55 to 69, ask your provider if you would benefit from a yearly prostate cancer screening test.  Lung cancer screening: If you are a current or former smoker age 50 to 80, ask your care team if ongoing lung cancer screenings are right for you.  For informational purposes only. Not to replace the advice of your health care provider. Copyright   2023 [employer]. All rights reserved. Clinically reviewed by the [facility] Transitions Program. [employer] 444356 - REV 01/24.    Learning About Stress  What is stress?     Stress is your  body's response to a hard situation. Your body can have a physical, emotional, or mental response. Stress is a fact of life for most people, and it affects everyone differently. What causes stress for you may not be stressful for someone else.  A lot of things can cause stress. You may feel stress when you go on a job interview, take a test, or run a race. This kind of short-term stress is normal and even useful. It can help you if you need to work hard or react quickly. For example, stress can help you finish an important job on time.  Long-term stress is caused by ongoing stressful situations or events. Examples of long-term stress include long-term health problems, ongoing problems at work, or conflicts in your family. Long-term stress can harm your health.  How does stress affect your health?  When you are stressed, your body responds as though you are in danger. It makes hormones that speed up your heart, make you breathe faster, and give you a burst of energy. This is called the fight-or-flight stress response. If the stress is over quickly, your body goes back to normal and no harm is done.  But if stress happens too often or lasts too long, it can have bad effects. Long-term stress can make you more likely to get sick, and it can make symptoms of some diseases worse. If you tense up when you are stressed, you may develop neck, shoulder, or low back pain. Stress is linked to high blood pressure and heart disease.  Stress also harms your emotional health. It can make you thorne, tense, or depressed. Your relationships may suffer, and you may not do well at work or school.  What can you do to manage stress?  You can try these things to help manage stress:   Do something active. Exercise or activity can help reduce stress. Walking is a great way to get started. Even everyday activities such as housecleaning or yard work can help.  Try yoga or amado chi. These techniques combine exercise and meditation. You may need  some training at first to learn them.  Do something you enjoy. For example, listen to music or go to a movie. Practice your hobby or do volunteer work.  Meditate. This can help you relax, because you are not worrying about what happened before or what may happen in the future.  Do guided imagery. Imagine yourself in any setting that helps you feel calm. You can use online videos, books, or a teacher to guide you.  Do breathing exercises. For example:  From a standing position, bend forward from the waist with your knees slightly bent. Let your arms dangle close to the floor.  Breathe in slowly and deeply as you return to a standing position. Roll up slowly and lift your head last.  Hold your breath for just a few seconds in the standing position.  Breathe out slowly and bend forward from the waist.  Let your feelings out. Talk, laugh, cry, and express anger when you need to. Talking with supportive friends or family, a counselor, or a kerline leader about your feelings is a healthy way to relieve stress. Avoid discussing your feelings with people who make you feel worse.  Write. It may help to write about things that are bothering you. This helps you find out how much stress you feel and what is causing it. When you know this, you can find better ways to cope.  What can you do to prevent stress?  You might try some of these things to help prevent stress:  Manage your time. This helps you find time to do the things you want and need to do.  Get enough sleep. Your body recovers from the stresses of the day while you are sleeping.  Get support. Your family, friends, and community can make a difference in how you experience stress.  Limit your news feed. Avoid or limit time on social media or news that may make you feel stressed.  Do something active. Exercise or activity can help reduce stress. Walking is a great way to get started.  Where can you learn more?  Go to https://www.healthwise.net/patiented  Enter N032 in the  "search box to learn more about \"Learning About Stress.\"  Current as of: October 24, 2023               Content Version: 14.0    4007-7578 [employer].   Care instructions adapted under license by your healthcare professional. If you have questions about a medical condition or this instruction, always ask your healthcare professional. [employer] disclaims any warranty or liability for your use of this information.      "

## 2024-04-12 LAB
BKR LAB AP GYN ADEQUACY: NORMAL
BKR LAB AP GYN INTERPRETATION: NORMAL
BKR LAB AP HPV REFLEX: NORMAL
BKR LAB AP PREVIOUS ABNORMAL: NORMAL
PATH REPORT.COMMENTS IMP SPEC: NORMAL
PATH REPORT.COMMENTS IMP SPEC: NORMAL
PATH REPORT.RELEVANT HX SPEC: NORMAL

## 2024-04-16 LAB
HUMAN PAPILLOMA VIRUS 16 DNA: NEGATIVE
HUMAN PAPILLOMA VIRUS 18 DNA: NEGATIVE
HUMAN PAPILLOMA VIRUS FINAL DIAGNOSIS: NORMAL
HUMAN PAPILLOMA VIRUS OTHER HR: NEGATIVE

## 2024-04-22 ENCOUNTER — LAB (OUTPATIENT)
Dept: LAB | Facility: CLINIC | Age: 54
End: 2024-04-22
Payer: COMMERCIAL

## 2024-04-22 DIAGNOSIS — E78.5 HYPERLIPIDEMIA LDL GOAL <130: ICD-10-CM

## 2024-04-22 DIAGNOSIS — E03.4 HYPOTHYROIDISM DUE TO ACQUIRED ATROPHY OF THYROID: ICD-10-CM

## 2024-04-22 DIAGNOSIS — Z13.0 SCREENING FOR DEFICIENCY ANEMIA: ICD-10-CM

## 2024-04-22 DIAGNOSIS — Z13.1 SCREENING FOR DIABETES MELLITUS: ICD-10-CM

## 2024-04-22 LAB
ALBUMIN SERPL BCG-MCNC: 4.4 G/DL (ref 3.5–5.2)
ALP SERPL-CCNC: 98 U/L (ref 40–150)
ALT SERPL W P-5'-P-CCNC: 29 U/L (ref 0–50)
ANION GAP SERPL CALCULATED.3IONS-SCNC: 13 MMOL/L (ref 7–15)
AST SERPL W P-5'-P-CCNC: 42 U/L (ref 0–45)
BILIRUB SERPL-MCNC: 0.5 MG/DL
BUN SERPL-MCNC: 15.4 MG/DL (ref 6–20)
CALCIUM SERPL-MCNC: 9.3 MG/DL (ref 8.6–10)
CHLORIDE SERPL-SCNC: 100 MMOL/L (ref 98–107)
CHOLEST SERPL-MCNC: 210 MG/DL
CREAT SERPL-MCNC: 0.71 MG/DL (ref 0.51–0.95)
DEPRECATED HCO3 PLAS-SCNC: 24 MMOL/L (ref 22–29)
EGFRCR SERPLBLD CKD-EPI 2021: >90 ML/MIN/1.73M2
ERYTHROCYTE [DISTWIDTH] IN BLOOD BY AUTOMATED COUNT: 13.3 % (ref 10–15)
FASTING STATUS PATIENT QL REPORTED: YES
GLUCOSE SERPL-MCNC: 96 MG/DL (ref 70–99)
HCT VFR BLD AUTO: 45 % (ref 35–47)
HDLC SERPL-MCNC: 89 MG/DL
HGB BLD-MCNC: 14.5 G/DL (ref 11.7–15.7)
LDLC SERPL CALC-MCNC: 111 MG/DL
MCH RBC QN AUTO: 34.3 PG (ref 26.5–33)
MCHC RBC AUTO-ENTMCNC: 32.2 G/DL (ref 31.5–36.5)
MCV RBC AUTO: 106 FL (ref 78–100)
NONHDLC SERPL-MCNC: 121 MG/DL
PLATELET # BLD AUTO: 270 10E3/UL (ref 150–450)
POTASSIUM SERPL-SCNC: 4.3 MMOL/L (ref 3.4–5.3)
PROT SERPL-MCNC: 7.6 G/DL (ref 6.4–8.3)
RBC # BLD AUTO: 4.23 10E6/UL (ref 3.8–5.2)
SODIUM SERPL-SCNC: 137 MMOL/L (ref 135–145)
TRIGL SERPL-MCNC: 51 MG/DL
TSH SERPL DL<=0.005 MIU/L-ACNC: 3.52 UIU/ML (ref 0.3–4.2)
WBC # BLD AUTO: 6.1 10E3/UL (ref 4–11)

## 2024-04-22 PROCEDURE — 85027 COMPLETE CBC AUTOMATED: CPT

## 2024-04-22 PROCEDURE — 80061 LIPID PANEL: CPT

## 2024-04-22 PROCEDURE — 84443 ASSAY THYROID STIM HORMONE: CPT

## 2024-04-22 PROCEDURE — 80053 COMPREHEN METABOLIC PANEL: CPT

## 2024-04-22 PROCEDURE — 36415 COLL VENOUS BLD VENIPUNCTURE: CPT

## 2024-04-23 NOTE — RESULT ENCOUNTER NOTE
Blanche Kelly,     -Normal red blood cell (hgb) levels, normal white blood cell count and normal platelet levels.  -Cholesterol levels are closer to your goal levels.  ADVISE: continuing your medication, a regular exercise program with at least 150 minutes of aerobic exercise per week, and eating a low saturated fat/low carbohydrate diet.  Also, you should recheck this fasting cholesterol panel in 12 months.  -Liver and gallbladder tests are normal (ALT,AST, Alk phos, bilirubin), kidney function is normal (Cr, GFR), sodium is normal, potassium is normal, calcium is normal, glucose is normal.  -TSH (thyroid stimulating hormone) level is normal which indicates appropriate thyroid replacement dosing.  ADVISE: continuing same replacement dose and rechecking this in 12 months.      Please send a [employer] message or call [phone]  if you have any questions.      Cait Roberts, CATHLEEN, CNP  [facility] [place]    If you have further questions about the interpretation of your labs, [email or web address] is a good website to check out for further information.

## 2024-04-25 ENCOUNTER — VIRTUAL VISIT (OUTPATIENT)
Dept: FAMILY MEDICINE | Facility: CLINIC | Age: 54
End: 2024-04-25
Payer: COMMERCIAL

## 2024-04-25 DIAGNOSIS — J01.90 ACUTE NON-RECURRENT SINUSITIS, UNSPECIFIED LOCATION: ICD-10-CM

## 2024-04-25 DIAGNOSIS — Z51.81 MEDICATION MONITORING ENCOUNTER: ICD-10-CM

## 2024-04-25 DIAGNOSIS — J06.9 UPPER RESPIRATORY TRACT INFECTION, UNSPECIFIED TYPE: Primary | ICD-10-CM

## 2024-04-25 PROCEDURE — 99213 OFFICE O/P EST LOW 20 MIN: CPT | Mod: 95 | Performed by: FAMILY MEDICINE

## 2024-04-25 RX ORDER — AZITHROMYCIN 250 MG/1
TABLET, FILM COATED ORAL
Qty: 6 TABLET | Refills: 0 | Status: SHIPPED | OUTPATIENT
Start: 2024-04-25

## 2024-04-25 ASSESSMENT — ENCOUNTER SYMPTOMS: COUGH: 1

## 2024-04-25 NOTE — PROGRESS NOTES
"Leticia is a 53 year old who is being evaluated via a billable video visit.    How would you like to obtain your AVS? MyChart  If the video visit is dropped, the invitation should be resent by: Text to cell phone: [phone]  Will anyone else be joining your video visit? No      Assessment & Plan     Upper respiratory tract infection, unspecified type    - azithromycin (ZITHROMAX) 250 MG tablet  Dispense: 6 tablet; Refill: 0    Acute non-recurrent sinusitis, unspecified location    - azithromycin (ZITHROMAX) 250 MG tablet  Dispense: 6 tablet; Refill: 0    Medication monitoring encounter    Prescription drug management    15 minutes spent by me on the date of the encounter doing chart review, history and exam, documentation and further activities per the note    BMI  Estimated body mass index is 34.2 kg/m  as calculated from the following:    Height as of 4/9/24: 1.575 m (5' 2\").    Weight as of 4/9/24: 84.8 kg (187 lb).   Weight management plan: diet and exercise    Work on weight loss  Regular exercise    Plan:    1) Medications: zithromax    2) Labs: NA    3) Immunizations: NA    4) Imaging/Diagnostics: NA    5) Consults: NA    6) Zithromax, mucinex DM, tylenol, follow up in clinic if any issues    Subjective   Leticia is a 53 year old, presenting for the following health issues:        4/25/2024     1:01 PM   Additional Questions   Roomed by Bren SARAVIA.     History of Present Illness       Reason for visit:  Persistent cough and stuffy nosyle for 3 weeks    She eats 2-3 servings of fruits and vegetables daily.She consumes 0 sweetened beverage(s) daily.She exercises with enough effort to increase her heart rate 9 or less minutes per day.  She exercises with enough effort to increase her heart rate 3 or less days per week. She is missing 1 dose(s) of medications per week.     Cough last 2 weeks, no f/c/s, ears ok, ringing initially, ST initially, sinus tenderness, rhinitis in am, red, yellow, green, PND, hoarseness, " cough in am, productive green and yellow, not wheezing, some GAYTAN, no n/v/d    Acute Illness  Acute illness concerns: cough  Onset/Duration: 3 weeks  Symptoms:  Fever: No  Chills/Sweats: No  Headache (location?): YES in the last week   Sinus Pressure: YES  Conjunctivitis:  No  Ear Pain: no  Rhinorrhea: No  Congestion: YES  Sore Throat: No - but voice is hoarse   Cough: YES-productive of yellow sputum, productive of green sputum in the beginning but not now   Wheeze: No  Decreased Appetite: No  Nausea: No  Vomiting: No  Diarrhea: No  Dysuria/Freq.: No  Dysuria or Hematuria: No  Fatigue/Achiness: some fatigue   Sick/Strep Exposure: No  Therapies tried and outcome: dayquil and nyquil - ibuprofen    Patient Active Problem List   Diagnosis    Premenstrual tension syndrome    CARDIOVASCULAR SCREENING; LDL GOAL LESS THAN 160    Pain in joint, ankle and foot    Hypothyroidism due to acquired atrophy of thyroid    Hyperlipidemia LDL goal <130    Family history of breast cancer in female - maternal half sister age 50    Recurrent cold sores    Lateral epicondylitis of both elbows       Past Medical History:   Diagnosis Date    Allergic rhinitis, cause unspecified     Dysmenorrhea 2006    Excessive or frequent menstruation 2006    Sees OB-GYN; on OCP Quarterly cycles. Considering Ablation     Iron deficiency anemia secondary to inadequate dietary iron intake 2004    Lateral epicondylitis of both elbows 2022    Premenstrual tension syndromes 2004    Prozac helpful    Unspecified hypothyroidism 3/2004    started low dose replacement; thyroid shad very high       Past Surgical History:   Procedure Laterality Date    GYN SURGERY  2011    ablation    HERNIA REPAIR  1975    HYSTERECTOMY SUPRACERVICAL  2012    partial    [facility] NONSPECIFIC PROCEDURE  1973    Right Inguinal Hernia Repair    [facility] NONSPECIFIC PROCEDURE  1995    D&C for Spontaneous        Current Outpatient Medications    Medication Sig Dispense Refill    acyclovir (ZOVIRAX) 200 MG capsule Take 1 capsule (200 mg) by mouth 5 times daily (May use as needed for cold sores) 25 capsule 1    atorvastatin (LIPITOR) 40 MG tablet Take 1 tablet (40 mg) by mouth daily 90 tablet 3    azithromycin (ZITHROMAX) 250 MG tablet 2 tabs day 1 and the 1 tab daily for 4 more days 6 tablet 0    FLUoxetine (PROZAC) 40 MG capsule Take 1 capsule (40 mg) by mouth daily 90 capsule 3    levothyroxine (SYNTHROID) 100 MCG tablet Take 1 tablet (100 mcg) by mouth daily 90 tablet 3    scopolamine (TRANSDERM) 1 MG/3DAYS 72 hr patch Place 1 patch onto the skin every 72 hours 4 patch 0       Allergies   Allergen Reactions    Amoxicillin-Pot Clavulanate GI Disturbance    Morphine And Related      gi    Pseudoephedrine Hcl      restlessness       Family History   Problem Relation Age of Onset    Thyroid Disease Mother         Hypothyroid    Hypertension Mother     Hyperlipidemia Mother     Cerebrovascular Disease Mother 70        TIA - former smoker    Hypertension Father     Diabetes Father         Insulin Dependent    Cardiovascular Father         Triple by-pass in [id number]    Coronary Artery Disease Father     Cancer Maternal Grandfather     Thyroid Disease Daughter         Dx at age 10    Thyroid Disease Son     Colon Cancer Maternal Half-Brother     Prostate Cancer Maternal Half-Brother     Breast Cancer Maternal Half-Sister 50    Colon Cancer No family hx of     Osteoporosis No family hx of        Social History     Socioeconomic History    Marital status:      Spouse name: None    Number of children: None    Years of education: None    Highest education level: None   Tobacco Use    Smoking status: Former     Current packs/day: 0.00     Average packs/day: 0.5 packs/day for 5.0 years (2.5 ttl pk-yrs)     Types: Cigarettes     Start date: 1985     Quit date: 1990     Years since quittin.3    Smokeless tobacco: Never    Tobacco comments:     QUIT  1988   Substance and Sexual Activity    Alcohol use: Yes     Comment: 2-3 times per week    Drug use: No    Sexual activity: Yes     Partners: Male     Birth control/protection: Female Surgical     Comment: Vasectomy   Other Topics Concern    Caffeine Concern Yes     Comment: 2 cups and soda daily    Exercise Yes     Comment: 3-4 per week    Parent/sibling w/ CABG, MI or angioplasty before 65F 55M? No   Social History Narrative    Works at [employer]     Social Determinants of Health     Financial Resource Strain: Low Risk  (4/8/2024)    Financial Resource Strain     Within the past 12 months, have you or your family members you live with been unable to get utilities (heat, electricity) when it was really needed?: No   Food Insecurity: Low Risk  (4/8/2024)    Food Insecurity     Within the past 12 months, did you worry that your food would run out before you got money to buy more?: No     Within the past 12 months, did the food you bought just not last and you didn t have money to get more?: No   Transportation Needs: Low Risk  (4/8/2024)    Transportation Needs     Within the past 12 months, has lack of transportation kept you from medical appointments, getting your medicines, non-medical meetings or appointments, work, or from getting things that you need?: No   Physical Activity: Insufficiently Active (4/8/2024)    Exercise Vital Sign     Days of Exercise per Week: 2 days     Minutes of Exercise per Session: 20 min   Stress: Stress Concern Present (4/8/2024)    [employer] - Occupational Stress Questionnaire     Feeling of Stress : To some extent   Social Connections: Unknown (4/8/2024)    Social Connection and Isolation Panel [NHANES]     Frequency of Social Gatherings with Friends and Family: Three times a week   Interpersonal Safety: Low Risk  (4/9/2024)    Interpersonal Safety     Within the past 12 months, have you been hit, slapped, kicked or otherwise physically hurt by  someone?: No     Within the past 12 months, have you been humiliated or emotionally abused in other ways by your partner or ex-partner?: No   Housing Stability: Low Risk  (4/8/2024)    Housing Stability     Do you have housing? : Yes     Are you worried about losing your housing?: No       Review of Systems  CONSTITUTIONAL: NEGATIVE for fever, chills, change in weight  INTEGUMENTARY/SKIN: NEGATIVE for worrisome rashes, moles or lesions  EYES: NEGATIVE for vision changes or irritation  ENT/MOUTH: NEGATIVE for ear, mouth and throat problems  RESP: NEGATIVE for significant cough or SOB  CV: NEGATIVE for chest pain, palpitations or peripheral edema  GI: NEGATIVE for nausea, abdominal pain, heartburn, or change in bowel habits  : NEGATIVE for frequency, dysuria, or hematuria  MUSCULOSKELETAL: NEGATIVE for significant arthralgias or myalgia  NEURO: NEGATIVE for weakness, dizziness or paresthesias  ENDOCRINE: NEGATIVE for temperature intolerance, skin/hair changes  HEME: NEGATIVE for bleeding problems  PSYCHIATRIC: NEGATIVE for changes in mood or affect      Objective       Vitals:  No vitals were obtained today due to virtual visit.    Physical Exam   GENERAL: alert and no distress  EYES: Eyes grossly normal to inspection.  No discharge or erythema, or obvious scleral/conjunctival abnormalities.  RESP: No audible wheeze, cough, or visible cyanosis.    SKIN: Visible skin clear. No significant rash, abnormal pigmentation or lesions.  NEURO: Cranial nerves grossly intact.  Mentation and speech appropriate for age.  PSYCH: Appropriate affect, tone, and pace of words      Video-Visit Details    Type of service:  Video Visit   Originating Location (pt. Location): Home    Distant Location (provider location):  On-site  Platform used for Video Visit: Kranthi    Signed Electronically by:              Tani Wheeler MD, FAAFP     [facility]  [address] [place] [address]  [id number]  [email or web address]  [email or web address]   Office: [phone]  Fax: [phone]

## 2024-11-01 ENCOUNTER — TELEPHONE (OUTPATIENT)
Dept: FAMILY MEDICINE | Facility: CLINIC | Age: 54
End: 2024-11-01
Payer: COMMERCIAL

## 2024-11-01 NOTE — TELEPHONE ENCOUNTER
Patient calls asking to get MA appointment for flu shot today. Advised patient that MA appointment was full today and not sure if patient can be worked in. Patient asking for message to be sent to MA team to see if she can get a flu shot. Patient is available any time today.    Thank you,  Kun Vargas, Triage RN Katelynn Arroyo  9:54 AM 11/1/2024

## 2024-11-01 NOTE — TELEPHONE ENCOUNTER
Talked to pt and she was going to [facility] or somewhere to get her flu shot.  We are full today.  Pt understood.    Closed encounter

## 2024-11-19 ENCOUNTER — OFFICE VISIT (OUTPATIENT)
Dept: FAMILY MEDICINE | Facility: CLINIC | Age: 54
End: 2024-11-19
Payer: COMMERCIAL

## 2024-11-19 VITALS
HEART RATE: 90 BPM | OXYGEN SATURATION: 98 % | WEIGHT: 193.3 LBS | TEMPERATURE: 98.4 F | SYSTOLIC BLOOD PRESSURE: 138 MMHG | HEIGHT: 62 IN | DIASTOLIC BLOOD PRESSURE: 90 MMHG | RESPIRATION RATE: 16 BRPM | BODY MASS INDEX: 35.57 KG/M2

## 2024-11-19 DIAGNOSIS — E66.812 CLASS 2 SEVERE OBESITY DUE TO EXCESS CALORIES WITH SERIOUS COMORBIDITY AND BODY MASS INDEX (BMI) OF 35.0 TO 35.9 IN ADULT (H): ICD-10-CM

## 2024-11-19 DIAGNOSIS — N95.1 MENOPAUSAL SYNDROME (HOT FLASHES): Primary | ICD-10-CM

## 2024-11-19 DIAGNOSIS — E66.01 CLASS 2 SEVERE OBESITY DUE TO EXCESS CALORIES WITH SERIOUS COMORBIDITY AND BODY MASS INDEX (BMI) OF 35.0 TO 35.9 IN ADULT (H): ICD-10-CM

## 2024-11-19 DIAGNOSIS — R03.0 ELEVATED BLOOD PRESSURE READING WITHOUT DIAGNOSIS OF HYPERTENSION: ICD-10-CM

## 2024-11-19 DIAGNOSIS — Z23 NEED FOR INFLUENZA VACCINATION: ICD-10-CM

## 2024-11-19 PROCEDURE — 90673 RIV3 VACCINE NO PRESERV IM: CPT | Performed by: NURSE PRACTITIONER

## 2024-11-19 PROCEDURE — 90471 IMMUNIZATION ADMIN: CPT | Performed by: NURSE PRACTITIONER

## 2024-11-19 PROCEDURE — 99214 OFFICE O/P EST MOD 30 MIN: CPT | Mod: 25 | Performed by: NURSE PRACTITIONER

## 2024-11-19 RX ORDER — ESTRADIOL 0.5 MG/1
0.5 TABLET ORAL DAILY
Qty: 90 TABLET | Refills: 3 | Status: SHIPPED | OUTPATIENT
Start: 2024-11-19

## 2024-11-19 NOTE — PROGRESS NOTES
"  Assessment & Plan       Leticia was seen today for menopausal sx.    Diagnoses and all orders for this visit:    Menopausal syndrome (hot flashes)  Worsening daytime and nighttime hot flashes.  Patient education completed regarding HRT, will start low dose Estradiol and plan follow-up in 1-2 months for recheck.    -     estradiol (ESTRACE) 0.5 MG tablet; Take 1 tablet (0.5 mg) by mouth daily.    Elevated blood pressure reading without diagnosis of hypertension  Goal blood pressure:  less than 130/80,  Encouraged patient to check blood pressure at home, bring in recorded values to follow-up appointment.      Class 2 severe obesity due to excess calories with serious comorbidity and body mass index (BMI) of 35.0 to 35.9 in adult (H)  Continue to work with patient regarding dietary and lifestyle modifications to support weight loss.      Need for influenza vaccination  -     INFLUENZA VACCINE TRIVALENT(FLUBLOK)          BMI  Estimated body mass index is 35.36 kg/m  as calculated from the following:    Height as of this encounter: 1.575 m (5' 2\").    Weight as of this encounter: 87.7 kg (193 lb 4.8 oz).     Return in about 2 months (around 1/19/2025) for Med check, BP Recheck, In Clinic.    The longitudinal plan of care for the diagnosis(es)/condition(s) as documented were addressed during this visit. Due to the added complexity in care, I will continue to support Leticia in the subsequent management and with ongoing continuity of care.    Subjective   Leticia is a 54 year old, presenting for the following health issues:  Menopausal Sx        11/19/2024    10:34 AM   Additional Questions   Roomed by jewel   History of Present Illness     Last seen in clinic in April 2024, discussed HRT due to increase in hot flashes.    S/P supracervical hysterectomy in 2012.      Symptoms improved for a period of 4 months and now with worsening symptoms since September.  Hot flashes throughout the day and waking up 4-6 times per " "night due to night sweats.      No personal hx of breast cancer, DVT, or current smoking     Reason for visit:  Menopause  Symptom onset:  More than a month  Symptoms include:  Hot flashes  Symptom intensity:  Moderate  Symptom progression:  Worsening  Had these symptoms before:  Yes  Has tried/received treatment for these symptoms:  No  What makes it worse:  No   She is taking medications regularly.     Onset of URI symptoms two days ago, runny nose and dry cough.    No fevers.        Review of Systems  Constitutional, HEENT, cardiovascular, pulmonary, gi and gu systems are negative, except as otherwise noted.      Objective    BP (!) 138/90   Pulse 90   Temp 98.4  F (36.9  C) (Temporal)   Resp 16   Ht 1.575 m (5' 2\")   Wt 87.7 kg (193 lb 4.8 oz)   LMP 09/13/2012   SpO2 98%   BMI 35.36 kg/m    Body mass index is 35.36 kg/m .    Physical Exam     GENERAL: alert and no distress  RESP: lungs clear to auscultation - no rales, rhonchi or wheezes  CV: regular rate and rhythm, normal S1 S2  PSYCH: mentation appears normal, affect normal/bright            Signed Electronically by: Cait Roberts, CATHLEEN CNP    "

## 2024-11-21 ENCOUNTER — NURSE TRIAGE (OUTPATIENT)
Dept: FAMILY MEDICINE | Facility: CLINIC | Age: 54
End: 2024-11-21
Payer: COMMERCIAL

## 2024-11-21 NOTE — TELEPHONE ENCOUNTER
Agree with continuing to monitor blood pressure at home and holding Mucinex DM, ok to continue regular Mucinex.     Patient can be added to an early afternoon virtual appt slot tomorrow or next day appointment slot on Tuesday, 11/26/24 with continued elevated blood pressures.      - Costa, CNP

## 2024-11-21 NOTE — TELEPHONE ENCOUNTER
S-(situation): High BP readings this AM. BP check scheduled 11/29 and follow up with PCP 1/3/25.    B-(background): Elevated BP at recent OV 11/19/24 - was advised to monitor, go in for nurse BP check, and follow up in 6 weeks with home BP log.    A-(assessment): Pt took BP this morning with readings of 148/104 and 157/108 around 530 AM. Pt does have a cold currently and taking Mucinex DM. Denies any other symptoms. Unable to get a BP reading while on the phone as pt was at work.    R-(recommendations): Advised to continue monitoring at home and keep log, bring log with to BP check appt. If any new or worsening symptoms to call clinic. Advised to stop Mucinex DM and switch to Mucinex. Will route to provider to advise as disposition states see within 3 days but pt does have BP check scheduled for next Friday. Do you want to see pt within 3 days or continue to monitor until BP check appt?    Eli GUTIERREZ, RN, PHN    Reason for Disposition   Systolic BP >= 160 OR Diastolic >= 100    Additional Information   Negative: Sounds like a life-threatening emergency to the triager   Negative: Symptom is main concern (e.g., headache, chest pain)   Negative: Low blood pressure is main concern   Negative: Systolic BP >= 160 OR Diastolic >= 100, and any cardiac (e.g., breathing difficulty, chest pain) or neurologic symptoms (e.g., new-onset blurred or double vision)   Negative: Pregnant 20 or more weeks (or postpartum < 6 weeks) with new hand or face swelling   Negative: Pregnant 20 or more weeks (or postpartum < 6 weeks) and Systolic BP >= 160 OR Diastolic >= 110   Negative: Patient sounds very sick or weak to the triager   Negative: Systolic BP >= 200 OR Diastolic >= 120 and having NO cardiac or neurologic symptoms   Negative: Pregnant 20 or more weeks (or postpartum < 6 weeks) with Systolic BP >= 140 OR Diastolic >= 90   Negative: Systolic BP >= 180 OR Diastolic >= 110, and missed most recent dose of blood pressure medication    "Negative: Systolic BP >= 180 OR Diastolic >= 110   Negative: Patient wants to be seen   Negative: Ran out of BP medications   Negative: Taking BP medications and feels is having side effects (e.g., impotence, cough, dizziness)    Answer Assessment - Initial Assessment Questions  1. BLOOD PRESSURE: \"What is your blood pressure?\" \"Did you take at least two measurements 5 minutes apart?\"      148/104 & 157/108  2. ONSET: \"When did you take your blood pressure?\"      At 530 AM this morning  3. HOW: \"How did you take your blood pressure?\" (e.g., automatic home BP monitor, visiting nurse)      Automatic at home BP  4. HISTORY: \"Do you have a history of high blood pressure?\"      Yes  5. MEDICINES: \"Are you taking any medicines for blood pressure?\" \"Have you missed any doses recently?\"      No  6. OTHER SYMPTOMS: \"Do you have any symptoms?\" (e.g., blurred vision, chest pain, difficulty breathing, headache, weakness)      No  7. PREGNANCY: \"Is there any chance you are pregnant?\" \"When was your last menstrual period?\"      No    Protocols used: Blood Pressure - High-A-OH    "

## 2024-11-21 NOTE — TELEPHONE ENCOUNTER
Called patient and explained providers note.     Patient preferred tomorrow's apt with provider- scheduled advised of location, arrival and apt time     Explained will cancel the BP visit given she is seeing a provider but still bring the BP machine to compare

## 2024-12-23 ENCOUNTER — OFFICE VISIT (OUTPATIENT)
Dept: FAMILY MEDICINE | Facility: CLINIC | Age: 54
End: 2024-12-23
Payer: COMMERCIAL

## 2024-12-23 VITALS
BODY MASS INDEX: 35.59 KG/M2 | HEIGHT: 62 IN | RESPIRATION RATE: 16 BRPM | TEMPERATURE: 97.4 F | WEIGHT: 193.4 LBS | SYSTOLIC BLOOD PRESSURE: 135 MMHG | HEART RATE: 79 BPM | OXYGEN SATURATION: 99 % | DIASTOLIC BLOOD PRESSURE: 89 MMHG

## 2024-12-23 DIAGNOSIS — R05.9 COUGH, UNSPECIFIED TYPE: ICD-10-CM

## 2024-12-23 DIAGNOSIS — E66.01 CLASS 2 SEVERE OBESITY DUE TO EXCESS CALORIES WITH SERIOUS COMORBIDITY AND BODY MASS INDEX (BMI) OF 35.0 TO 35.9 IN ADULT (H): ICD-10-CM

## 2024-12-23 DIAGNOSIS — E66.812 CLASS 2 SEVERE OBESITY DUE TO EXCESS CALORIES WITH SERIOUS COMORBIDITY AND BODY MASS INDEX (BMI) OF 35.0 TO 35.9 IN ADULT (H): ICD-10-CM

## 2024-12-23 DIAGNOSIS — I10 BENIGN ESSENTIAL HYPERTENSION: Primary | ICD-10-CM

## 2024-12-23 DIAGNOSIS — N95.1 MENOPAUSAL SYNDROME (HOT FLASHES): ICD-10-CM

## 2024-12-23 PROCEDURE — G2211 COMPLEX E/M VISIT ADD ON: HCPCS | Performed by: NURSE PRACTITIONER

## 2024-12-23 PROCEDURE — 99214 OFFICE O/P EST MOD 30 MIN: CPT | Performed by: NURSE PRACTITIONER

## 2024-12-23 RX ORDER — AZITHROMYCIN 250 MG/1
TABLET, FILM COATED ORAL
Qty: 6 TABLET | Refills: 0 | Status: SHIPPED | OUTPATIENT
Start: 2024-12-23 | End: 2024-12-28

## 2024-12-23 NOTE — PROGRESS NOTES
"  Assessment & Plan     Leticia was seen today for hypertension.    Diagnoses and all orders for this visit:    Benign essential hypertension  Noted improvement in blood pressures with initiation of Losartan 50 mg daily, goal blood pressure less than 130/80.    Continue to monitor at home.      Class 2 severe obesity due to excess calories with serious comorbidity and body mass index (BMI) of 35.0 to 35.9 in adult (H)  Co-morbid condition:  HTN, Hyperlipidemia  Continue to work with patient regarding dietary and lifestyle modifications to support weight loss.      Cough, unspecified type  Ongoing, lingering cough over the past 4-5 weeks.  Will treat empirically with Azithromycin.   Follow-up with no improvement or worsening of cough.    -     azithromycin (ZITHROMAX) 250 MG tablet; Take 2 tablets (500 mg) by mouth daily for 1 day, THEN 1 tablet (250 mg) daily for 4 days.    Menopausal syndrome  Noted improvement with initiation of Estradiol 0.5 mg once daily.   Continue to monitor.        BMI  Estimated body mass index is 35.37 kg/m  as calculated from the following:    Height as of this encounter: 1.575 m (5' 2\").    Weight as of this encounter: 87.7 kg (193 lb 6.4 oz).     Return in about 2 weeks (around 1/6/2025) for No improvement or sooner with worsening symptoms.    The longitudinal plan of care for the diagnosis(es)/condition(s) as documented were addressed during this visit. Due to the added complexity in care, I will continue to support Leticia in the subsequent management and with ongoing continuity of care.      Subjective   Leticia is a 54 year old, presenting for the following health issues:  Hypertension        12/23/2024     2:17 PM   Additional Questions   Roomed by Ruben WARREN   Accompanied by Self     HPI     Onset of URI symptoms  Has been sick multiple times since September, has gotten better.   Last illness started about one month ago.    Cough is non-productive.  Shortness of breath with cough and " taking a deep breath.  No wheezing.   No fevers.  No fatigue.    Woke up with a sore throat this morning.    Was with grandson over the weekend.      Few hot flashes.   Not waking up at night with night sweats.   Feels well overall with Estradiol 0.5 mg.      Hypertension Follow-up    Do you check your blood pressure regularly outside of the clinic? Yes   Are you following a low salt diet? No  Are your blood pressures ever more than 140 on the top number (systolic) OR more   than 90 on the bottom number (diastolic), for example 140/90? Yes      Social History     Tobacco Use    Smoking status: Former     Current packs/day: 0.00     Average packs/day: 0.5 packs/day for 5.0 years (2.5 ttl pk-yrs)     Types: Cigarettes     Start date: 1985     Quit date: 1990     Years since quittin.0    Smokeless tobacco: Never    Tobacco comments:     QUIT    Vaping Use    Vaping status: Never Used   Substance Use Topics    Alcohol use: Yes     Comment: 2-3 times per week    Drug use: No         2018     3:38 PM 3/15/2019     2:31 PM   PHQ   PHQ-9 Total Score 1 1   Q9: Thoughts of better off dead/self-harm past 2 weeks Not at all Not at all         2015     5:04 PM 2018     3:38 PM 3/15/2019     2:31 PM   RENETTA-7 SCORE   Total Score 0     Total Score  0 2       How many servings of fruits and vegetables do you eat daily?  0-1  On average, how many sweetened beverages do you drink each day (Examples: soda, juice, sweet tea, etc.  Do NOT count diet or artificially sweetened beverages)?   0  How many days per week do you exercise enough to make your heart beat faster? 3 or less  How many minutes a day do you exercise enough to make your heart beat faster? 9 or less  How many days per week do you miss taking your medication? 0      Review of Systems  Constitutional, HEENT, cardiovascular, pulmonary, gi and gu systems are negative, except as otherwise noted.      Objective    /89   Pulse 79   Temp 97.4  " F (36.3  C) (Tympanic)   Resp 16   Ht 1.575 m (5' 2\")   Wt 87.7 kg (193 lb 6.4 oz)   LMP 09/13/2012   SpO2 99%   BMI 35.37 kg/m    Body mass index is 35.37 kg/m .    Physical Exam     GENERAL: alert and no distress  RESP: harsh cough, lungs clear to auscultation - no rales, rhonchi or wheezes  CV: regular rate and rhythm, normal S1 S2, no S3 or S4, no murmur, click or rub, no peripheral edema  PSYCH: mentation appears normal, affect normal/bright        Signed Electronically by: Cait Roberts, CATHLEEN CNP    "

## 2025-01-16 ENCOUNTER — PATIENT OUTREACH (OUTPATIENT)
Dept: CARE COORDINATION | Facility: CLINIC | Age: 55
End: 2025-01-16
Payer: COMMERCIAL

## 2025-01-27 ENCOUNTER — OFFICE VISIT (OUTPATIENT)
Dept: DERMATOLOGY | Facility: CLINIC | Age: 55
End: 2025-01-27
Payer: COMMERCIAL

## 2025-01-27 DIAGNOSIS — Z12.83 SKIN CANCER SCREENING: Primary | ICD-10-CM

## 2025-01-27 DIAGNOSIS — D18.01 CHERRY ANGIOMA: ICD-10-CM

## 2025-01-27 DIAGNOSIS — D22.9 MULTIPLE NEVI: ICD-10-CM

## 2025-01-27 DIAGNOSIS — L82.1 SEBORRHEIC KERATOSIS: ICD-10-CM

## 2025-01-27 DIAGNOSIS — L81.4 LENTIGO: ICD-10-CM

## 2025-01-27 ASSESSMENT — PAIN SCALES - GENERAL: PAINLEVEL_OUTOF10: NO PAIN (0)

## 2025-01-27 NOTE — LETTER
1/27/2025      Leticia Aquino  [address] [place] [address]      Dear Colleague,    Thank you for referring your patient, Leticia Aquino, to the [facility]. Please see a copy of my visit note below.    [facility] Dermatology Note  Encounter Date: Jan 27, 2025  Office Visit     Reviewed patients past medical history and pertinent chart review prior to patients visit today.     Dermatology Problem List:  Last skin check: 1/27/2025    Personal Hx: No personal history of skin cancer  Family Hx: Negative for family history of skin cancer.    Social Hx: Works for a [employer] company  _________________________________________    Assessment & Plan:     # Multiple nevi, trunk and extremities  # Solar lentigines  - Continued observation recommended.   - Nevi demonstrate no concerning features on dermoscopy. We discussed the importance of self exams at home.   - ABCDEs: Counseled ABCDEs of melanoma: Asymmetry, Border (irregularity), Color (not uniform, changes in color), Diameter (greater than 6 mm which is about the size of a pencil eraser), and Evolving (any changes in preexisting moles).  - Sun protection: Counseled SPF 30+ sunscreen, UPF clothing, sun avoidance, tanning bed avoidance.    # Cherry angiomas  # Seborrheic keratoses  - We discussed the benign nature of the skin lesions. No treatment required. Continued observation recommended. Follow up with any concerns.      Follow-up: 1-3 year(s) for follow up full body skin exam, prn for new or changing lesions or new concerns    All risks, benefits and alternatives were discussed with patient.  Patient is in agreement and understands the assessment and plan.  All questions were answered.  Mariajose Murphy PA-C  [facility] Dermatology  ____________________________________________    CC: Skin cancer screening exam    HPI:  Ms. Leticia Aquino is a(n) 54 year old female who presents today as a return  patient for a full body skin cancer screening. Patient has concerns today about a lesion on the chest x2 and right shoulder. These lesions are asymptomatic.     Patient is diligent with photoprotection. She typically will sun burn then eventually tan. Patient is otherwise feeling well, without additional skin concerns.     Physical Exam:  Vitals: LMP 09/13/2012   SKIN: Total skin excluding the genitalia areas was performed. The exam included the head/face, neck, both arms, chest, back, abdomen, both legs, digits, mons pubis, buttock and nails.   -Thao II  -medial chest x2, waxy, stuck on tan to brown papule   -right posterior shoulder, pink to brown fleshy papule with reassuring features  -multiple tan/brown flat round macules and raised papules scattered throughout trunk, extremities, and face. No worrisome features for malignancy noted on examination.  -scattered tan, homogenous macules scattered on sun exposed areas of trunk, extremities, and face  -scattered waxy, stuck on tan/brown papules and patches on the trunk and extremities  -several 1-2mm red dome shaped symmetric papules scattered on the trunk and extremities    - No other lesions of concern on areas examined.     Medications:  Current Outpatient Medications   Medication Sig Dispense Refill     acyclovir (ZOVIRAX) 200 MG capsule Take 1 capsule (200 mg) by mouth 5 times daily (May use as needed for cold sores) 25 capsule 1     atorvastatin (LIPITOR) 40 MG tablet Take 1 tablet (40 mg) by mouth daily 90 tablet 3     estradiol (ESTRACE) 0.5 MG tablet Take 1 tablet (0.5 mg) by mouth daily. 90 tablet 3     FLUoxetine (PROZAC) 40 MG capsule Take 1 capsule (40 mg) by mouth daily 90 capsule 3     levothyroxine (SYNTHROID) 100 MCG tablet Take 1 tablet (100 mcg) by mouth daily 90 tablet 3     losartan (COZAAR) 50 MG tablet Take 1 tablet (50 mg) by mouth daily. 90 tablet 3     Current Facility-Administered Medications   Medication Dose Route Frequency  Provider Last Rate Last Admin     2 mL ropivacaine (NAROPIN) injection 5 mg/mL  2 mL   Yeo, Albert, MD   2 mL at 08/09/23 0829     4 mL ropivacaine (NAROPIN) injection 5 mg/mL  4 mL   Yeo, Albert, MD   4 mL at 08/09/23 0828     lidocaine 1 % injection 1 mL  1 mL   Yeo, Albert, MD   1 mL at 02/06/23 0820     lidocaine 1 % injection 1 mL  1 mL   Yeo, Albert, MD   1 mL at 01/23/23 1635     lidocaine 1 % injection 5 mL  5 mL   Yeo, Albert, MD   5 mL at 08/09/23 0828     methylPREDNISolone (DEPO-Medrol) injection 40 mg  40 mg   Yeo, Albert, MD   40 mg at 08/09/23 0828     methylPREDNISolone (DEPO-Medrol) injection 40 mg  40 mg   Yeo, Albert, MD   40 mg at 08/09/23 0829     methylPREDNISolone (DEPO-MEDROL) injection 40 mg  40 mg   Yeo, Albert, MD   40 mg at 02/06/23 0820     methylPREDNISolone (DEPO-MEDROL) injection 40 mg  40 mg   Yeo, Albert, MD   40 mg at 01/23/23 1635      Past Medical History:   Patient Active Problem List   Diagnosis     Premenstrual tension syndrome     CARDIOVASCULAR SCREENING; LDL GOAL LESS THAN 160     Pain in joint, ankle and foot     Hypothyroidism due to acquired atrophy of thyroid     Hyperlipidemia LDL goal <130     Family history of breast cancer in female - maternal half sister age 50     Recurrent cold sores     Lateral epicondylitis of both elbows     Class 2 severe obesity due to excess calories with serious comorbidity in adult (H)     Past Medical History:   Diagnosis Date     Allergic rhinitis, cause unspecified      Dysmenorrhea 12/18/2006     Excessive or frequent menstruation 12/18/2006    Sees OB-GYN; on OCP Quarterly cycles. Considering Ablation      Iron deficiency anemia secondary to inadequate dietary iron intake 04/01/2004     Lateral epicondylitis of both elbows 12/28/2022     Premenstrual tension syndromes 2004    Prozac helpful     Unspecified hypothyroidism 03/2004    started low dose replacement; thyroid shad very high       CC Referred Self, MD  No address on file on  close of this encounter.      Again, thank you for allowing me to participate in the care of your patient.        Sincerely,        Ester Murphy PA-C    Electronically signed

## 2025-01-27 NOTE — PATIENT INSTRUCTIONS
Patient Education       Proper skin care from [facility]:    -Eliminate harsh soaps as they strip the natural oils from the skin, often resulting in dry itchy skin ( i.e. Dial, Zest, [place])  -Use mild soaps such as Cetaphil or Dove Sensitive Skin in the shower. You do not need to use soap on arms, legs, and trunk every time you shower unless visibly soiled.   -Avoid hot or cold showers.  -After showering, lightly dry off and apply moisturizing within 2-3 minutes. This will help trap moisture in the skin.   -Aggressive use of a moisturizer at least 1-2 times a day to the entire body (including -Vanicream, Cetaphil, Aquaphor or Cerave) and moisturize hands after every washing.  -We recommend using moisturizers that come in a tub that needs to be scooped out, not a pump. This has more of an oil base. It will hold moisture in your skin much better than a water base moisturizer. The above recommended are non-pore clogging.      Wear a sunscreen with at least SPF 30 on your face, ears, neck and V of the chest daily. Wear sunscreen on other areas of the body if those areas are exposed to the sun throughout the day. Sunscreens can contain physical and/or chemical blockers. Physical blockers are less likely to clog pores, these include zinc oxide and titanium dioxide. Reapply every two hour and after swimming.     Sunscreen examples: https://www.ewg.org/sunscreen/    UV radiation  UVA radiation remains constant throughout the day and throughout the year. It is a longer wavelength than UVB and therefore penetrates deeper into the skin leading to immediate and delayed tanning, photoaging, and skin cancer. 70-80% of UVA and UVB radiation occurs between the hours of 10am-2pm.  UVB radiation  UVB radiation causes the most harmful effects and is more significant during the summer months. However, snow and ice can reflect UVB radiation leading to skin damage during the winter months as well. UVB radiation is  responsible for tanning, burning, inflammation, delayed erythema (pinkness), pigmentation (brown spots), and skin cancer.     I recommend self monthly full body exams and yearly full body exams with a dermatology provider. If you develop a new or changing lesion please follow up for examination. Most skin cancers are pink and scaly or pink and pearly. However, we do see blue/brown/black skin cancers.  Consider the ABCDEs of melanoma when giving yourself your monthly full body exam ( don't forget the groin, buttocks, feet, toes, etc). A-asymmetry, B-borders, C-color, D-diameter, E-elevation or evolving. If you see any of these changes please follow up in clinic. If you cannot see your back I recommend purchasing a hand held mirror to use with a larger wall mirror.       Checking for Skin Cancer  You can find cancer early by checking your skin each month. There are 3 kinds of skin cancer. They are melanoma, basal cell carcinoma, and squamous cell carcinoma. Doing monthly skin checks is the best way to find new marks or skin changes. Follow the instructions below for checking your skin.   The ABCDEs of checking moles for melanoma   Check your moles or growths for signs of melanoma using ABCDE:   Asymmetry: the sides of the mole or growth don t match  Border: the edges are ragged, notched, or blurred  Color: the color within the mole or growth varies  Diameter: the mole or growth is larger than 6 mm (size of a pencil eraser)  Evolving: the size, shape, or color of the mole or growth is changing (evolving is not shown in the images below)    Checking for other types of skin cancer  Basal cell carcinoma or squamous cell carcinoma have symptoms such as:     A spot or mole that looks different from all other marks on your skin  Changes in how an area feels, such as itching, tenderness, or pain  Changes in the skin's surface, such as oozing, bleeding, or scaliness  A sore that does not heal  New swelling or redness beyond  the border of a mole    Who s at risk?  Anyone can get skin cancer. But you are at greater risk if you have:   Fair skin, light-colored hair, or light-colored eyes  Many moles or abnormal moles on your skin  A history of sunburns from sunlight or tanning beds  A family history of skin cancer  A history of exposure to radiation or chemicals  A weakened immune system  If you have had skin cancer in the past, you are at risk for recurring skin cancer.   How to check your skin  Do your monthly skin checkups in front of a full-length mirror. Check all parts of your body, including your:   Head (ears, face, neck, and scalp)  Torso (front, back, and sides)  Arms (tops, undersides, upper, and lower armpits)  Hands (palms, backs, and fingers, including under the nails)  Buttocks and genitals  Legs (front, back, and sides)  Feet (tops, soles, toes, including under the nails, and between toes)  If you have a lot of moles, take digital photos of them each month. Make sure to take photos both up close and from a distance. These can help you see if any moles change over time.   Most skin changes are not cancer. But if you see any changes in your skin, call your doctor right away. Only he or she can diagnose a problem. If you have skin cancer, seeing your doctor can be the first step toward getting the treatment that could save your life.   Real Time Translation last reviewed this educational content on 4/1/2019 2000-2020 The [employer]. [address] [place] [address], [place] [address]. All rights reserved. This information is not intended as a substitute for professional medical care. Always follow your healthcare professional's instructions.       When should I call my doctor?  If you are worsening or not improving, please, contact us or seek urgent care as noted below.     Who should I call with questions (adults)?  [facility] (adult and pediatric): [phone]  [facility] [place] (adult): [phone]  [facility] ([place], [place], [place] and [place]) [phone]  For urgent needs outside of business hours call the [facility] at [phone] and ask for the dermatology resident on call to be paged  If this is a medical emergency and you are unable to reach an ER, Call 911      If you need a prescription refill, please contact your pharmacy. Refills are approved or denied by our Physicians during normal business hours, Monday through Fridays  Per office policy, refills will not be granted if you have not been seen within the past year (or sooner depending on your child's condition

## 2025-01-27 NOTE — PROGRESS NOTES
[facility] Dermatology Note  Encounter Date: Jan 27, 2025  Office Visit     Reviewed patients past medical history and pertinent chart review prior to patients visit today.     Dermatology Problem List:  Last skin check: 1/27/2025    Personal Hx: No personal history of skin cancer  Family Hx: Negative for family history of skin cancer.    Social Hx: Works for a [employer]  _________________________________________    Assessment & Plan:     # Multiple nevi, trunk and extremities  # Solar lentigines  - Continued observation recommended.   - Nevi demonstrate no concerning features on dermoscopy. We discussed the importance of self exams at home.   - ABCDEs: Counseled ABCDEs of melanoma: Asymmetry, Border (irregularity), Color (not uniform, changes in color), Diameter (greater than 6 mm which is about the size of a pencil eraser), and Evolving (any changes in preexisting moles).  - Sun protection: Counseled SPF 30+ sunscreen, UPF clothing, sun avoidance, tanning bed avoidance.    # Cherry angiomas  # Seborrheic keratoses  - We discussed the benign nature of the skin lesions. No treatment required. Continued observation recommended. Follow up with any concerns.      Follow-up: 1-3 year(s) for follow up full body skin exam, prn for new or changing lesions or new concerns    All risks, benefits and alternatives were discussed with patient.  Patient is in agreement and understands the assessment and plan.  All questions were answered.  Mariajose Murphy PA-C  [facility] Dermatology  ____________________________________________    CC: Skin cancer screening exam    HPI:  Ms. Leticia Aquino is a(n) 54 year old female who presents today as a return patient for a full body skin cancer screening. Patient has concerns today about a lesion on the chest x2 and right shoulder. These lesions are asymptomatic.     Patient is diligent with photoprotection. She typically will sun burn then eventually tan.  Patient is otherwise feeling well, without additional skin concerns.     Physical Exam:  Vitals: LMP 09/13/2012   SKIN: Total skin excluding the genitalia areas was performed. The exam included the head/face, neck, both arms, chest, back, abdomen, both legs, digits, mons pubis, buttock and nails.   -Thao II  -medial chest x2, waxy, stuck on tan to brown papule   -right posterior shoulder, pink to brown fleshy papule with reassuring features  -multiple tan/brown flat round macules and raised papules scattered throughout trunk, extremities, and face. No worrisome features for malignancy noted on examination.  -scattered tan, homogenous macules scattered on sun exposed areas of trunk, extremities, and face  -scattered waxy, stuck on tan/brown papules and patches on the trunk and extremities  -several 1-2mm red dome shaped symmetric papules scattered on the trunk and extremities    - No other lesions of concern on areas examined.     Medications:  Current Outpatient Medications   Medication Sig Dispense Refill    acyclovir (ZOVIRAX) 200 MG capsule Take 1 capsule (200 mg) by mouth 5 times daily (May use as needed for cold sores) 25 capsule 1    atorvastatin (LIPITOR) 40 MG tablet Take 1 tablet (40 mg) by mouth daily 90 tablet 3    estradiol (ESTRACE) 0.5 MG tablet Take 1 tablet (0.5 mg) by mouth daily. 90 tablet 3    FLUoxetine (PROZAC) 40 MG capsule Take 1 capsule (40 mg) by mouth daily 90 capsule 3    levothyroxine (SYNTHROID) 100 MCG tablet Take 1 tablet (100 mcg) by mouth daily 90 tablet 3    losartan (COZAAR) 50 MG tablet Take 1 tablet (50 mg) by mouth daily. 90 tablet 3     Current Facility-Administered Medications   Medication Dose Route Frequency Provider Last Rate Last Admin    2 mL ropivacaine (NAROPIN) injection 5 mg/mL  2 mL   Yeo, Albert, MD   2 mL at 08/09/23 0829    4 mL ropivacaine (NAROPIN) injection 5 mg/mL  4 mL   Yeo, Albert, MD   4 mL at 08/09/23 0828    lidocaine 1 % injection 1 mL  1 mL    Yeo, Albert, MD   1 mL at 02/06/23 0820    lidocaine 1 % injection 1 mL  1 mL   Yeo, Albert, MD   1 mL at 01/23/23 1635    lidocaine 1 % injection 5 mL  5 mL   Yeo, Albert, MD   5 mL at 08/09/23 0828    methylPREDNISolone (DEPO-Medrol) injection 40 mg  40 mg   Yeo, Albert, MD   40 mg at 08/09/23 0828    methylPREDNISolone (DEPO-Medrol) injection 40 mg  40 mg   Yeo, Albert, MD   40 mg at 08/09/23 0829    methylPREDNISolone (DEPO-MEDROL) injection 40 mg  40 mg   Yeo, Albert, MD   40 mg at 02/06/23 0820    methylPREDNISolone (DEPO-MEDROL) injection 40 mg  40 mg   Yeo, Albert, MD   40 mg at 01/23/23 1635      Past Medical History:   Patient Active Problem List   Diagnosis    Premenstrual tension syndrome    CARDIOVASCULAR SCREENING; LDL GOAL LESS THAN 160    Pain in joint, ankle and foot    Hypothyroidism due to acquired atrophy of thyroid    Hyperlipidemia LDL goal <130    Family history of breast cancer in female - maternal half sister age 50    Recurrent cold sores    Lateral epicondylitis of both elbows    Class 2 severe obesity due to excess calories with serious comorbidity in adult (H)     Past Medical History:   Diagnosis Date    Allergic rhinitis, cause unspecified     Dysmenorrhea 12/18/2006    Excessive or frequent menstruation 12/18/2006    Sees OB-GYN; on OCP Quarterly cycles. Considering Ablation     Iron deficiency anemia secondary to inadequate dietary iron intake 04/01/2004    Lateral epicondylitis of both elbows 12/28/2022    Premenstrual tension syndromes 2004    Prozac helpful    Unspecified hypothyroidism 03/2004    started low dose replacement; thyroid shad very high       CC Referred Self, MD  No address on file on close of this encounter.

## 2025-02-03 ENCOUNTER — ANCILLARY PROCEDURE (OUTPATIENT)
Dept: GENERAL RADIOLOGY | Facility: CLINIC | Age: 55
End: 2025-02-03
Attending: FAMILY MEDICINE
Payer: COMMERCIAL

## 2025-02-03 ENCOUNTER — OFFICE VISIT (OUTPATIENT)
Dept: ORTHOPEDICS | Facility: CLINIC | Age: 55
End: 2025-02-03
Payer: COMMERCIAL

## 2025-02-03 DIAGNOSIS — M17.11 PRIMARY OSTEOARTHRITIS OF RIGHT KNEE: ICD-10-CM

## 2025-02-03 DIAGNOSIS — M25.561 ACUTE PAIN OF RIGHT KNEE: ICD-10-CM

## 2025-02-03 DIAGNOSIS — M25.561 ACUTE PAIN OF RIGHT KNEE: Primary | ICD-10-CM

## 2025-02-03 PROCEDURE — 20611 DRAIN/INJ JOINT/BURSA W/US: CPT | Mod: RT | Performed by: FAMILY MEDICINE

## 2025-02-03 PROCEDURE — 99213 OFFICE O/P EST LOW 20 MIN: CPT | Mod: 25 | Performed by: FAMILY MEDICINE

## 2025-02-03 PROCEDURE — 73562 X-RAY EXAM OF KNEE 3: CPT | Mod: TC | Performed by: FAMILY MEDICINE

## 2025-02-03 RX ORDER — LIDOCAINE HYDROCHLORIDE 10 MG/ML
5 INJECTION, SOLUTION INFILTRATION; PERINEURAL
Status: COMPLETED | OUTPATIENT
Start: 2025-02-03 | End: 2025-02-03

## 2025-02-03 RX ORDER — METHYLPREDNISOLONE ACETATE 40 MG/ML
40 INJECTION, SUSPENSION INTRA-ARTICULAR; INTRALESIONAL; INTRAMUSCULAR; SOFT TISSUE
Status: COMPLETED | OUTPATIENT
Start: 2025-02-03 | End: 2025-02-03

## 2025-02-03 RX ORDER — ROPIVACAINE HYDROCHLORIDE 5 MG/ML
4 INJECTION, SOLUTION EPIDURAL; INFILTRATION; PERINEURAL
Status: COMPLETED | OUTPATIENT
Start: 2025-02-03 | End: 2025-02-03

## 2025-02-03 RX ADMIN — LIDOCAINE HYDROCHLORIDE 5 ML: 10 INJECTION, SOLUTION INFILTRATION; PERINEURAL at 08:39

## 2025-02-03 RX ADMIN — ROPIVACAINE HYDROCHLORIDE 4 ML: 5 INJECTION, SOLUTION EPIDURAL; INFILTRATION; PERINEURAL at 08:39

## 2025-02-03 RX ADMIN — METHYLPREDNISOLONE ACETATE 40 MG: 40 INJECTION, SUSPENSION INTRA-ARTICULAR; INTRALESIONAL; INTRAMUSCULAR; SOFT TISSUE at 08:39

## 2025-02-03 NOTE — PROGRESS NOTES
ASSESSMENT & PLAN  Patient Instructions     1. Acute pain of right knee    2. Primary osteoarthritis of right knee      -Patient has acute right knee pain and swelling due to arthritis  -X-rays taken in office today of the right knee shows mild degenerative changes   -patient requested and tolerated right knee aspiration and intra-articular cortisone injection today without complications.  Patient was given postprocedure instructions  -Patient will start home exercise program to strengthen and stabilize the leg and knee.  Patient was given home exercise program  -Patient will follow-up when pain returns  -Call direct clinic number [[phone]] at any time with questions or concerns.    Albert Yeo MD [facility] and [facility] Medicine  [facility]        -----    SUBJECTIVE  Leticia Aquino is a/an 54 year old female who is seen as a self referral for evaluation of right knee pain. The patient is seen by themselves.    Onset: 1 month(s) ago. Reports insidious onset without acute precipitating event.  Location of Pain: right anterior knee  Rating of Pain at worst: 8/10  Rating of Pain Currently: 6/10  Worsened by: The more they are weight bearing  Better with: None  Treatments tried: rest/activity avoidance, ice, and Ibuprofen  Associated symptoms: swelling  Orthopedic history: YES - Date: 2023 Hx of left knee arthritis   Relevant surgical history: NO  Social history: social history: works in an office    Past Medical History:   Diagnosis Date    Allergic rhinitis, cause unspecified     Dysmenorrhea 12/18/2006    Excessive or frequent menstruation 12/18/2006    Sees OB-GYN; on OCP Quarterly cycles. Considering Ablation     Iron deficiency anemia secondary to inadequate dietary iron intake 04/01/2004    Lateral epicondylitis of both elbows 12/28/2022    Premenstrual tension syndromes 2004    Prozac helpful    Unspecified hypothyroidism 03/2004    started low dose replacement; thyroid  shad very high     Social History     Socioeconomic History    Marital status:    Tobacco Use    Smoking status: Former     Current packs/day: 0.00     Average packs/day: 0.5 packs/day for 5.0 years (2.5 ttl pk-yrs)     Types: Cigarettes     Start date: 1985     Quit date: 1990     Years since quittin.1    Smokeless tobacco: Never    Tobacco comments:     QUIT    Vaping Use    Vaping status: Never Used   Substance and Sexual Activity    Alcohol use: Yes     Comment: 2-3 times per week    Drug use: No    Sexual activity: Yes     Partners: Male     Birth control/protection: Female Surgical     Comment: Vasectomy   Other Topics Concern    Caffeine Concern Yes     Comment: 2 cups and soda daily    Exercise Yes     Comment: 3-4 per week    Parent/sibling w/ CABG, MI or angioplasty before 65F 55M? No   Social History Narrative    Works at [employer]     Social Drivers of Health     Financial Resource Strain: Low Risk  (2024)    Financial Resource Strain     Within the past 12 months, have you or your family members you live with been unable to get utilities (heat, electricity) when it was really needed?: No   Food Insecurity: Low Risk  (2024)    Food Insecurity     Within the past 12 months, did you worry that your food would run out before you got money to buy more?: No     Within the past 12 months, did the food you bought just not last and you didn t have money to get more?: No   Transportation Needs: Low Risk  (2024)    Transportation Needs     Within the past 12 months, has lack of transportation kept you from medical appointments, getting your medicines, non-medical meetings or appointments, work, or from getting things that you need?: No   Physical Activity: Insufficiently Active (2024)    Exercise Vital Sign     Days of Exercise per Week: 2 days     Minutes of Exercise per Session: 20 min   Stress: Stress Concern Present (2024)    [place] of Occupational  Health - Occupational Stress Questionnaire     Feeling of Stress : To some extent   Social Connections: Unknown (4/8/2024)    Social Connection and Isolation Panel [NHANES]     Frequency of Social Gatherings with Friends and Family: Three times a week   Interpersonal Safety: Low Risk  (11/19/2024)    Interpersonal Safety     Do you feel physically and emotionally safe where you currently live?: Yes     Within the past 12 months, have you been hit, slapped, kicked or otherwise physically hurt by someone?: No     Within the past 12 months, have you been humiliated or emotionally abused in other ways by your partner or ex-partner?: No   Housing Stability: Low Risk  (4/8/2024)    Housing Stability     Do you have housing? : Yes     Are you worried about losing your housing?: No         Patient's past medical, surgical, social, and family histories were reviewed today and no changes are noted.    REVIEW OF SYSTEMS:  10 point ROS is negative other than symptoms noted above in HPI, Past Medical History or as stated below  Constitutional: NEGATIVE for fever, chills, change in weight  Skin: NEGATIVE for worrisome rashes, moles or lesions  GI/: NEGATIVE for bowel or bladder changes  Neuro: NEGATIVE for weakness, dizziness or paresthesias    OBJECTIVE:  LMP 09/13/2012    General: healthy, alert and in no distress  HEENT: no scleral icterus or conjunctival erythema  Skin: no suspicious lesions or rash. No jaundice.  CV: no pedal edema  Resp: normal respiratory effort without conversational dyspnea   Psych: normal mood and affect  Gait: normal steady gait with appropriate coordination and balance  Neuro: Normal light sensory exam of lower extremity  MSK:  RIGHT KNEE  Inspection:    normal alignment  Palpation:    Tender about the lateral joint line and medial joint line. Remainder of bony and ligamentous landmarks are nontender.    Mild effusion is present    Patellofemoral crepitus is Absent  Range of Motion:     50 extension  to 1000 flexion  Strength:    Quadriceps grossly intact    Extensor mechanism intact  Special Tests:    Positive: none    Negative: MCL/valgus stress (0 & 30 deg), LCL/varus stress (0 & 30 deg), Lachman's, anterior drawer, posterior drawer, Johnie's    Independent visualization of the below image:  Recent Results (from the past 24 hours)   XR Knee Standing AP [place] Bilat Lat Right    Narrative    Mild medial and patellofemoral compartment degenerative changes with mild   chondrocalcinosis.  No acute fracture or dislocation.           Large Joint Injection/Arthocentesis: R knee joint    Date/Time: 2/3/2025 8:39 AM    Performed by: Yeo, Albert, MD  Authorized by: Yeo, Albert, MD    Indications:  Pain and osteoarthritis  Needle Size:  22 G  Guidance: ultrasound    Approach:  Superolateral  Location:  Knee      Medications:  40 mg methylPREDNISolone 40 MG/ML; 4 mL ROPivacaine 5 MG/ML; 5 mL lidocaine 1 %  Aspirate amount (mL):  24  Aspirate:  Yellow and clear  Outcome:  Tolerated well, no immediate complications  Procedure discussed: discussed risks, benefits, and alternatives    Consent Given by:  Patient  Timeout: timeout called immediately prior to procedure    Prep: patient was prepped and draped in usual sterile fashion     Ultrasound was used to ensure safe and accurate needle placement and injection. Ultrasound images of the procedure were permanently stored.          Albert Yeo MD [facility]

## 2025-02-03 NOTE — PATIENT INSTRUCTIONS
1. Acute pain of right knee    2. Primary osteoarthritis of right knee      -Patient has acute right knee pain and swelling due to arthritis  -X-rays taken in office today of the right knee shows mild degenerative changes   -patient requested and tolerated right knee aspiration and intra-articular cortisone injection today without complications.  Patient was given postprocedure instructions  -Patient will start home exercise program to strengthen and stabilize the leg and knee.  Patient was given home exercise program  -Patient will follow-up when pain returns  -Call direct clinic number [[phone]] at any time with questions or concerns.    Albert Yeo MD [facility]  [facility] [place]

## 2025-02-03 NOTE — LETTER
2/3/2025      Leticia Aquino  [address] [place] [address]      Dear Colleague,    Thank you for referring your patient, Leticia Aquino, to the [facility] [place]. Please see a copy of my visit note below.    ASSESSMENT & PLAN  Patient Instructions     1. Acute pain of right knee    2. Primary osteoarthritis of right knee      -Patient has acute right knee pain and swelling due to arthritis  -X-rays taken in office today of the right knee shows mild degenerative changes   -patient requested and tolerated right knee aspiration and intra-articular cortisone injection today without complications.  Patient was given postprocedure instructions  -Patient will start home exercise program to strengthen and stabilize the leg and knee.  Patient was given home exercise program  -Patient will follow-up when pain returns  -Call direct clinic number [[phone]] at any time with questions or concerns.    Albert Yeo MD [facility] and [facility] Medicine  [facility]        -----    SUBJECTIVE  Leticia Aquino is a/an 54 year old female who is seen as a self referral for evaluation of right knee pain. The patient is seen by themselves.    Onset: 1 month(s) ago. Reports insidious onset without acute precipitating event.  Location of Pain: right anterior knee  Rating of Pain at worst: 8/10  Rating of Pain Currently: 6/10  Worsened by: The more they are weight bearing  Better with: None  Treatments tried: rest/activity avoidance, ice, and Ibuprofen  Associated symptoms: swelling  Orthopedic history: YES - Date: 2023 Hx of left knee arthritis   Relevant surgical history: NO  Social history: social history: works in an office    Past Medical History:   Diagnosis Date     Allergic rhinitis, cause unspecified      Dysmenorrhea 12/18/2006     Excessive or frequent menstruation 12/18/2006    Sees OB-GYN; on OCP Quarterly cycles. Considering Ablation       Iron deficiency anemia secondary to inadequate dietary iron intake 2004     Lateral epicondylitis of both elbows 2022     Premenstrual tension syndromes 2004    Prozac helpful     Unspecified hypothyroidism 2004    started low dose replacement; thyroid shad very high     Social History     Socioeconomic History     Marital status:    Tobacco Use     Smoking status: Former     Current packs/day: 0.00     Average packs/day: 0.5 packs/day for 5.0 years (2.5 ttl pk-yrs)     Types: Cigarettes     Start date: 1985     Quit date: 1990     Years since quittin.1     Smokeless tobacco: Never     Tobacco comments:     QUIT    Vaping Use     Vaping status: Never Used   Substance and Sexual Activity     Alcohol use: Yes     Comment: 2-3 times per week     Drug use: No     Sexual activity: Yes     Partners: Male     Birth control/protection: Female Surgical     Comment: Vasectomy   Other Topics Concern     Caffeine Concern Yes     Comment: 2 cups and soda daily     Exercise Yes     Comment: 3-4 per week     Parent/sibling w/ CABG, MI or angioplasty before 65F 55M? No   Social History Narrative    Works at [employer]     Social Drivers of Health     Financial Resource Strain: Low Risk  (2024)    Financial Resource Strain      Within the past 12 months, have you or your family members you live with been unable to get utilities (heat, electricity) when it was really needed?: No   Food Insecurity: Low Risk  (2024)    Food Insecurity      Within the past 12 months, did you worry that your food would run out before you got money to buy more?: No      Within the past 12 months, did the food you bought just not last and you didn t have money to get more?: No   Transportation Needs: Low Risk  (2024)    Transportation Needs      Within the past 12 months, has lack of transportation kept you from medical appointments, getting your medicines, non-medical meetings or appointments, work, or  from getting things that you need?: No   Physical Activity: Insufficiently Active (4/8/2024)    Exercise Vital Sign      Days of Exercise per Week: 2 days      Minutes of Exercise per Session: 20 min   Stress: Stress Concern Present (4/8/2024)    [place] of Occupational Health - Occupational Stress Questionnaire      Feeling of Stress : To some extent   Social Connections: Unknown (4/8/2024)    Social Connection and Isolation Panel [NHANES]      Frequency of Social Gatherings with Friends and Family: Three times a week   Interpersonal Safety: Low Risk  (11/19/2024)    Interpersonal Safety      Do you feel physically and emotionally safe where you currently live?: Yes      Within the past 12 months, have you been hit, slapped, kicked or otherwise physically hurt by someone?: No      Within the past 12 months, have you been humiliated or emotionally abused in other ways by your partner or ex-partner?: No   Housing Stability: Low Risk  (4/8/2024)    Housing Stability      Do you have housing? : Yes      Are you worried about losing your housing?: No         Patient's past medical, surgical, social, and family histories were reviewed today and no changes are noted.    REVIEW OF SYSTEMS:  10 point ROS is negative other than symptoms noted above in HPI, Past Medical History or as stated below  Constitutional: NEGATIVE for fever, chills, change in weight  Skin: NEGATIVE for worrisome rashes, moles or lesions  GI/: NEGATIVE for bowel or bladder changes  Neuro: NEGATIVE for weakness, dizziness or paresthesias    OBJECTIVE:  LMP 09/13/2012    General: healthy, alert and in no distress  HEENT: no scleral icterus or conjunctival erythema  Skin: no suspicious lesions or rash. No jaundice.  CV: no pedal edema  Resp: normal respiratory effort without conversational dyspnea   Psych: normal mood and affect  Gait: normal steady gait with appropriate coordination and balance  Neuro: Normal light sensory exam of lower  extremity  MSK:  RIGHT KNEE  Inspection:    normal alignment  Palpation:    Tender about the lateral joint line and medial joint line. Remainder of bony and ligamentous landmarks are nontender.    Mild effusion is present    Patellofemoral crepitus is Absent  Range of Motion:     50 extension to 1000 flexion  Strength:    Quadriceps grossly intact    Extensor mechanism intact  Special Tests:    Positive: none    Negative: MCL/valgus stress (0 & 30 deg), LCL/varus stress (0 & 30 deg), Lachman's, anterior drawer, posterior drawer, Johnie's    Independent visualization of the below image:  Recent Results (from the past 24 hours)   XR Knee Standing AP [place] Bilat Lat Right    Narrative    Mild medial and patellofemoral compartment degenerative changes with mild   chondrocalcinosis.  No acute fracture or dislocation.           Large Joint Injection/Arthocentesis: R knee joint    Date/Time: 2/3/2025 8:39 AM    Performed by: Yeo, Albert, MD  Authorized by: Yeo, Albert, MD    Indications:  Pain and osteoarthritis  Needle Size:  22 G  Guidance: ultrasound    Approach:  Superolateral  Location:  Knee      Medications:  40 mg methylPREDNISolone 40 MG/ML; 4 mL ROPivacaine 5 MG/ML; 5 mL lidocaine 1 %  Aspirate amount (mL):  24  Aspirate:  Yellow and clear  Outcome:  Tolerated well, no immediate complications  Procedure discussed: discussed risks, benefits, and alternatives    Consent Given by:  Patient  Timeout: timeout called immediately prior to procedure    Prep: patient was prepped and draped in usual sterile fashion     Ultrasound was used to ensure safe and accurate needle placement and injection. Ultrasound images of the procedure were permanently stored.          Albert Yeo MD [facility]      Again, thank you for allowing me to participate in the care of your patient.        Sincerely,        Albert Yeo, MD    Electronically signed

## 2025-02-04 ENCOUNTER — VIRTUAL VISIT (OUTPATIENT)
Dept: FAMILY MEDICINE | Facility: CLINIC | Age: 55
End: 2025-02-04
Payer: COMMERCIAL

## 2025-02-04 DIAGNOSIS — N95.1 MENOPAUSAL SYNDROME (HOT FLASHES): ICD-10-CM

## 2025-02-04 DIAGNOSIS — I10 BENIGN ESSENTIAL HYPERTENSION: Primary | ICD-10-CM

## 2025-02-04 PROCEDURE — 98006 SYNCH AUDIO-VIDEO EST MOD 30: CPT | Performed by: NURSE PRACTITIONER

## 2025-02-04 RX ORDER — LOSARTAN POTASSIUM 50 MG/1
75 TABLET ORAL DAILY
Qty: 135 TABLET | Refills: 3 | Status: SHIPPED | OUTPATIENT
Start: 2025-02-04

## 2025-02-04 RX ORDER — ESTRADIOL 0.5 MG/1
0.5 TABLET ORAL DAILY
Qty: 90 TABLET | Refills: 3 | Status: SHIPPED | OUTPATIENT
Start: 2025-02-04

## 2025-02-04 NOTE — PROGRESS NOTES
Leticia is a 54 year old who is being evaluated via a billable video visit.    How would you like to obtain your AVS? MyChart  If the video visit is dropped, the invitation should be resent by: Text to cell phone: [phone]  Will anyone else be joining your video visit? No      Assessment & Plan     Leticia was seen today for recheck medication.    Diagnoses and all orders for this visit:    Benign essential hypertension, goal less than 130/80  Continued elevated blood pressures at home, will increase Losartan to 75 mg once daily and continue to closely monitor.  May follow-up for BP check in pharmacy.    -     losartan (COZAAR) 50 MG tablet; Take 1.5 tablets (75 mg) by mouth daily.    Menopausal syndrome (hot flashes)  Stable, requested prescription to be sent to mail pharmacy.    -     estradiol (ESTRACE) 0.5 MG tablet; Take 1 tablet (0.5 mg) by mouth daily.      The longitudinal plan of care for the diagnosis(es)/condition(s) as documented were addressed during this visit. Due to the added complexity in care, I will continue to support Leticia in the subsequent management and with ongoing continuity of care.                Subjective   Leticia is a 54 year old, presenting for the following health issues:  Recheck Medication        2/4/2025     4:48 PM   Additional Questions   Roomed by Melba OCONNOR CMA     History of Present Illness       Hypertension: She presents for follow up of hypertension.  She does check blood pressure  regularly outside of the clinic. Outside blood pressures have been over 140/90. She does not follow a low salt diet.     She eats 2-3 servings of fruits and vegetables daily.She consumes 0 sweetened beverage(s) daily.She exercises with enough effort to increase her heart rate 9 or less minutes per day.  She exercises with enough effort to increase her heart rate 3 or less days per week.   She is taking medications regularly.     Blood pressure is still slightly high at home.    Had a cortisone  injection yesterday. 158/90 last night.   142/86, 147/84, 137/83, 161/94  126/74    Hyperlipidemia Follow-Up  Recent Labs   Lab Test 04/22/24  0934 01/18/23  0748   CHOL 210* 228*   HDL 89 80   * 137*   TRIG 51 56       Are you regularly taking any medication or supplement to lower your cholesterol?   Yes- atorvastatin 40 MG  Are you having muscle aches or other side effects that you think could be caused by your cholesterol lowering medication?  No    Hypothyroidism Follow-up  TSH   Date Value Ref Range Status   04/22/2024 3.52 0.30 - 4.20 uIU/mL Final   01/18/2023 1.44 0.30 - 4.20 uIU/mL Final   11/26/2021 1.19 0.40 - 4.00 mU/L Final   09/29/2020 2.40 0.40 - 4.00 mU/L Final   05/08/2020 2.48 0.40 - 4.00 mU/L Final   03/14/2019 1.87 0.40 - 4.00 mU/L Final   06/06/2018 1.82 0.40 - 4.00 mU/L Final   04/27/2017 3.20 0.40 - 4.00 mU/L Final       Since last visit, patient describes the following symptoms: Weight stable, no hair loss, no skin changes, no constipation, no loose stools        Review of Systems  Constitutional, HEENT, cardiovascular, pulmonary, gi and gu systems are negative, except as otherwise noted.      Objective           Vitals:  No vitals were obtained today due to virtual visit.    Physical Exam   GENERAL: alert and no distress  EYES: Eyes grossly normal to inspection.   RESP: No audible wheeze, cough, or visible cyanosis.    SKIN: Visible skin clear. No significant rash, abnormal pigmentation or lesions.  NEURO: Cranial nerves grossly intact.  Mentation and speech appropriate for age.  PSYCH: Appropriate affect, tone, and pace of words          Video-Visit Details    Type of service:  Video Visit   Originating Location (pt. Location): Home  Distant Location (provider location):  On-site  Platform used for Video Visit: Kranthi    Signed Electronically by: Cait Roberts, CATHLEEN REYES

## 2025-03-10 ENCOUNTER — ANCILLARY PROCEDURE (OUTPATIENT)
Dept: MAMMOGRAPHY | Facility: CLINIC | Age: 55
End: 2025-03-10
Attending: NURSE PRACTITIONER
Payer: COMMERCIAL

## 2025-03-10 DIAGNOSIS — Z12.31 VISIT FOR SCREENING MAMMOGRAM: ICD-10-CM

## 2025-03-10 PROCEDURE — 77067 SCR MAMMO BI INCL CAD: CPT | Mod: TC | Performed by: RADIOLOGY

## 2025-03-10 PROCEDURE — 77063 BREAST TOMOSYNTHESIS BI: CPT | Mod: TC | Performed by: RADIOLOGY

## 2025-03-29 DIAGNOSIS — N94.3 PREMENSTRUAL TENSION SYNDROME: ICD-10-CM

## 2025-03-31 RX ORDER — FLUOXETINE HYDROCHLORIDE 40 MG/1
40 CAPSULE ORAL DAILY
Qty: 90 CAPSULE | Refills: 2 | Status: SHIPPED | OUTPATIENT
Start: 2025-03-31

## 2025-04-02 DIAGNOSIS — E78.5 HYPERLIPIDEMIA LDL GOAL <130: ICD-10-CM

## 2025-04-02 RX ORDER — ATORVASTATIN CALCIUM 40 MG/1
40 TABLET, FILM COATED ORAL DAILY
Qty: 90 TABLET | Refills: 0 | Status: SHIPPED | OUTPATIENT
Start: 2025-04-02

## 2025-05-06 ENCOUNTER — OFFICE VISIT (OUTPATIENT)
Dept: INTERNAL MEDICINE | Facility: CLINIC | Age: 55
End: 2025-05-06
Payer: COMMERCIAL

## 2025-05-06 ENCOUNTER — ANCILLARY PROCEDURE (OUTPATIENT)
Dept: GENERAL RADIOLOGY | Facility: CLINIC | Age: 55
End: 2025-05-06
Attending: INTERNAL MEDICINE
Payer: COMMERCIAL

## 2025-05-06 VITALS
SYSTOLIC BLOOD PRESSURE: 140 MMHG | BODY MASS INDEX: 36.05 KG/M2 | DIASTOLIC BLOOD PRESSURE: 80 MMHG | HEART RATE: 86 BPM | TEMPERATURE: 99.2 F | HEIGHT: 62 IN | OXYGEN SATURATION: 98 % | WEIGHT: 195.9 LBS

## 2025-05-06 DIAGNOSIS — Z13.6 SCREENING FOR CARDIOVASCULAR CONDITION: ICD-10-CM

## 2025-05-06 DIAGNOSIS — R05.2 SUBACUTE COUGH: ICD-10-CM

## 2025-05-06 DIAGNOSIS — E78.5 HYPERLIPIDEMIA LDL GOAL <130: ICD-10-CM

## 2025-05-06 DIAGNOSIS — I10 BENIGN ESSENTIAL HYPERTENSION: ICD-10-CM

## 2025-05-06 DIAGNOSIS — R05.3 CHRONIC COUGH: Primary | ICD-10-CM

## 2025-05-06 DIAGNOSIS — E03.4 HYPOTHYROIDISM DUE TO ACQUIRED ATROPHY OF THYROID: ICD-10-CM

## 2025-05-06 PROCEDURE — 71046 X-RAY EXAM CHEST 2 VIEWS: CPT | Mod: TC | Performed by: RADIOLOGY

## 2025-05-06 PROCEDURE — 99214 OFFICE O/P EST MOD 30 MIN: CPT | Performed by: INTERNAL MEDICINE

## 2025-05-06 PROCEDURE — 3077F SYST BP >= 140 MM HG: CPT | Performed by: INTERNAL MEDICINE

## 2025-05-06 PROCEDURE — 3079F DIAST BP 80-89 MM HG: CPT | Performed by: INTERNAL MEDICINE

## 2025-05-06 PROCEDURE — 1125F AMNT PAIN NOTED PAIN PRSNT: CPT | Performed by: INTERNAL MEDICINE

## 2025-05-06 RX ORDER — BUDESONIDE AND FORMOTEROL FUMARATE DIHYDRATE 80; 4.5 UG/1; UG/1
2 AEROSOL RESPIRATORY (INHALATION) 2 TIMES DAILY
Qty: 10.2 G | Refills: 0 | Status: SHIPPED | OUTPATIENT
Start: 2025-05-06

## 2025-05-06 RX ORDER — DOXYCYCLINE HYCLATE 100 MG
100 TABLET ORAL 2 TIMES DAILY
Qty: 14 TABLET | Refills: 0 | Status: SHIPPED | OUTPATIENT
Start: 2025-05-06 | End: 2025-05-13

## 2025-05-06 ASSESSMENT — PAIN SCALES - GENERAL: PAINLEVEL_OUTOF10: MODERATE PAIN (5)

## 2025-05-06 NOTE — PROGRESS NOTES
"  Assessment & Plan          Chronic  cough  Chest x-ray ordered  Doxycycline and Symbicort prescribed  May need CT of the chest and pulmonary referral if not improving      Benign essential hypertension  Controlled    Hyperlipidemia LDL goal <130       Hypothyroidism due to acquired atrophy of thyroid         BMI  Estimated body mass index is 35.83 kg/m  as calculated from the following:    Height as of this encounter: 1.575 m (5' 2\").    Weight as of this encounter: 88.9 kg (195 lb 14.4 oz).             Subjective   Leticia is a 54 year old, presenting for the following health issues:  URI      5/6/2025     1:35 PM   Additional Questions   Roomed by Danita TOLENTINO CMA     URI    History of Present Illness       Reason for visit:  Sinus infection  Symptom onset:  1-2 weeks ago  Symptoms include:  Headache, sinus congestion, cough  Symptom intensity:  Moderate  Symptom progression:  Worsening  Had these symptoms before:  Yes  Has tried/received treatment for these symptoms:  Yes  Previous treatment was successful:  Yes  Prior treatment description:  Unsure  What makes it worse:  No  What makes it better:  No   She is taking medications regularly.          Patient has been having trouble with recurrent cough for the last 6 months.  She has been through treatments with azithromycin twice and also Medrol Dosepak once.  She does not smoke currently, has remote history of passive smoke.    Denies any history of asthma.  Has persistent dry cough.          Review of Systems  Constitutional, neuro, ENT, endocrine, pulmonary, cardiac, gastrointestinal, genitourinary, musculoskeletal, integument and psychiatric systems are negative, except as otherwise noted.      Objective    Ht 1.575 m (5' 2\")   LMP 09/13/2012 (Exact Date)   Breastfeeding No   BMI 35.37 kg/m    Body mass index is 35.37 kg/m .  Physical Exam   GENERAL: alert and no distress  NECK: no adenopathy, no asymmetry, masses, or scars  RESP: lungs clear to " auscultation - no rales, rhonchi or wheezes  CV: regular rate and rhythm, normal S1 S2, no S3 or S4, no murmur, click or rub, no peripheral edema  ABDOMEN: soft, nontender, no hepatosplenomegaly, no masses and bowel sounds normal  MS: no gross musculoskeletal defects noted, no edema            Signed Electronically by: Bran Chaney MD

## 2025-05-07 ENCOUNTER — RESULTS FOLLOW-UP (OUTPATIENT)
Dept: INTERNAL MEDICINE | Facility: CLINIC | Age: 55
End: 2025-05-07

## 2025-05-12 ENCOUNTER — TELEPHONE (OUTPATIENT)
Dept: INTERNAL MEDICINE | Facility: CLINIC | Age: 55
End: 2025-05-12
Payer: COMMERCIAL

## 2025-05-12 DIAGNOSIS — R05.3 CHRONIC COUGH: Primary | ICD-10-CM

## 2025-05-12 DIAGNOSIS — J06.9 UPPER RESPIRATORY TRACT INFECTION, UNSPECIFIED TYPE: ICD-10-CM

## 2025-05-12 NOTE — TELEPHONE ENCOUNTER
Pt was seen at the [facility] on 5/6 and the Doxycycline medication is making her sick.     Takes it with food. Only been taking it once a day. She is afraid to take twice a day because of the vomiting.     Within 30 minutes she vomits the medication. Twice she barely had any notice, vomited really quickly.   Has not taken anything for nausea.     Only twice she was able to hold down medication.     She is starting to feel better for her URI, but is afraid if stops, she will not get well.     She still has a cough.     Please advise if should have a different medication.

## 2025-05-13 RX ORDER — LEVOFLOXACIN 500 MG/1
500 TABLET, FILM COATED ORAL DAILY
Qty: 5 TABLET | Refills: 0 | Status: SHIPPED | OUTPATIENT
Start: 2025-05-13 | End: 2025-05-18

## 2025-05-13 NOTE — TELEPHONE ENCOUNTER
5/11/25 last dose.     Pt reporting cough improved doxycycline but not completely gone. Afibrile.     Pt is hoping to start another antibiotic. Pt had three doses of doxycycline.     Reviewed provider message from note below. Pt verbalizes understanding and agrees to plan of care. Pt will  rx from pharmacy.    Statement Selected

## 2025-05-21 ENCOUNTER — OFFICE VISIT (OUTPATIENT)
Dept: FAMILY MEDICINE | Facility: CLINIC | Age: 55
End: 2025-05-21
Payer: COMMERCIAL

## 2025-05-21 VITALS
TEMPERATURE: 97.3 F | OXYGEN SATURATION: 100 % | BODY MASS INDEX: 36.62 KG/M2 | DIASTOLIC BLOOD PRESSURE: 88 MMHG | RESPIRATION RATE: 13 BRPM | HEIGHT: 62 IN | WEIGHT: 199 LBS | SYSTOLIC BLOOD PRESSURE: 136 MMHG | HEART RATE: 94 BPM

## 2025-05-21 DIAGNOSIS — E66.812 CLASS 2 SEVERE OBESITY DUE TO EXCESS CALORIES WITH SERIOUS COMORBIDITY AND BODY MASS INDEX (BMI) OF 35.0 TO 35.9 IN ADULT (H): ICD-10-CM

## 2025-05-21 DIAGNOSIS — I10 BENIGN ESSENTIAL HYPERTENSION: ICD-10-CM

## 2025-05-21 DIAGNOSIS — E66.01 CLASS 2 SEVERE OBESITY DUE TO EXCESS CALORIES WITH SERIOUS COMORBIDITY AND BODY MASS INDEX (BMI) OF 35.0 TO 35.9 IN ADULT (H): ICD-10-CM

## 2025-05-21 DIAGNOSIS — E03.4 HYPOTHYROIDISM DUE TO ACQUIRED ATROPHY OF THYROID: ICD-10-CM

## 2025-05-21 DIAGNOSIS — Z00.00 ROUTINE GENERAL MEDICAL EXAMINATION AT A HEALTH CARE FACILITY: Primary | ICD-10-CM

## 2025-05-21 DIAGNOSIS — R05.3 CHRONIC COUGH: ICD-10-CM

## 2025-05-21 DIAGNOSIS — N95.1 MENOPAUSAL SYNDROME (HOT FLASHES): ICD-10-CM

## 2025-05-21 DIAGNOSIS — E78.5 HYPERLIPIDEMIA LDL GOAL <130: ICD-10-CM

## 2025-05-21 DIAGNOSIS — N94.3 PREMENSTRUAL TENSION SYNDROME: ICD-10-CM

## 2025-05-21 DIAGNOSIS — M25.50 MULTIPLE JOINT PAIN: ICD-10-CM

## 2025-05-21 RX ORDER — ESTRADIOL 0.5 MG/1
0.5 TABLET ORAL DAILY
Qty: 90 TABLET | Refills: 3 | Status: SHIPPED | OUTPATIENT
Start: 2025-05-21

## 2025-05-21 RX ORDER — FLUOXETINE HYDROCHLORIDE 40 MG/1
40 CAPSULE ORAL DAILY
Qty: 90 CAPSULE | Refills: 3 | Status: SHIPPED | OUTPATIENT
Start: 2025-05-21

## 2025-05-21 RX ORDER — ATORVASTATIN CALCIUM 40 MG/1
40 TABLET, FILM COATED ORAL DAILY
Qty: 90 TABLET | Refills: 3 | Status: SHIPPED | OUTPATIENT
Start: 2025-05-21

## 2025-05-21 RX ORDER — LEVOTHYROXINE SODIUM 100 UG/1
100 TABLET ORAL DAILY
Qty: 90 TABLET | Refills: 3 | Status: SHIPPED | OUTPATIENT
Start: 2025-05-21

## 2025-05-21 RX ORDER — LOSARTAN POTASSIUM 50 MG/1
75 TABLET ORAL DAILY
Qty: 135 TABLET | Refills: 3 | Status: SHIPPED | OUTPATIENT
Start: 2025-05-21

## 2025-05-21 SDOH — HEALTH STABILITY: PHYSICAL HEALTH: ON AVERAGE, HOW MANY DAYS PER WEEK DO YOU ENGAGE IN MODERATE TO STRENUOUS EXERCISE (LIKE A BRISK WALK)?: 1 DAY

## 2025-05-21 SDOH — HEALTH STABILITY: PHYSICAL HEALTH: ON AVERAGE, HOW MANY MINUTES DO YOU ENGAGE IN EXERCISE AT THIS LEVEL?: 10 MIN

## 2025-05-21 ASSESSMENT — SOCIAL DETERMINANTS OF HEALTH (SDOH): HOW OFTEN DO YOU GET TOGETHER WITH FRIENDS OR RELATIVES?: ONCE A WEEK

## 2025-05-21 NOTE — PROGRESS NOTES
Preventive Care Visit  [facility] PRIOR CATHLEEN Hancock CNP, Family Medicine  May 21, 2025      Assessment & Plan     Leticia was seen today for physical and recheck medication.    Diagnoses and all orders for this visit:    Routine general medical examination at a health care facility  -     Pneumococcal 20 Valent Conjugate (PCV20)  -     PRIMARY CARE FOLLOW-UP SCHEDULING; Future    Benign essential hypertension  Stable on Losartan 75 mg daily.    -     COMPREHENSIVE METABOLIC PANEL; Future  -     losartan (COZAAR) 50 MG tablet; Take 1.5 tablets (75 mg) by mouth daily.    Hyperlipidemia LDL goal <130  Previously stable on Atorvastatin 40 mg daily.    -     Lipid panel reflex to direct LDL Non-fasting; Future  -     atorvastatin (LIPITOR) 40 MG tablet; Take 1 tablet (40 mg) by mouth daily.    Hypothyroidism due to acquired atrophy of thyroid  TSH   Date Value Ref Range Status   04/22/2024 3.52 0.30 - 4.20 uIU/mL Final   01/18/2023 1.44 0.30 - 4.20 uIU/mL Final   11/26/2021 1.19 0.40 - 4.00 mU/L Final   09/29/2020 2.40 0.40 - 4.00 mU/L Final   05/08/2020 2.48 0.40 - 4.00 mU/L Final   03/14/2019 1.87 0.40 - 4.00 mU/L Final   06/06/2018 1.82 0.40 - 4.00 mU/L Final   04/27/2017 3.20 0.40 - 4.00 mU/L Final   Previously stable on Levothyroxine 100 mcg daily, due for recheck of TSH level.    -     TSH WITH FREE T4 REFLEX; Future  -     levothyroxine (SYNTHROID) 100 MCG tablet; Take 1 tablet (100 mcg) by mouth daily.    Chronic cough  Seen on 5/6/25 for chronic cough, negative chest x-ray.  Treated with antibiotics and Symbicort initiated.   Currently using Symbicort once daily and this has been helpful for treatment of cough.  Continue to monitor.   Discussed pulmonary consultation/PFT's with return of chronic cough.      Premenstrual tension syndrome  Stable on selective serotonin reuptake inhibitor - Fluoxetine 40 mg daily.    -     FLUoxetine (PROZAC) 40 MG capsule; Take 1 capsule (40 mg) by  "mouth daily.    Multiple joint pain  Plan further evaluation for rheumatoid arthritis vs. Autoimmune/rheumatological/inflammatory cause.    -     CBC with platelets; Future  -     Rheumatoid factor; Future  -     Anti Nuclear Tammie IgG by IFA with Reflex; Future  -     ESR: Erythrocyte sedimentation rate; Future  -     CRP, inflammation; Future    Menopausal syndrome (hot flashes)  Stable and noted improvement on HRT, estradiol 0.5 mg once daily.    -     estradiol (ESTRACE) 0.5 MG tablet; Take 1 tablet (0.5 mg) by mouth daily.    Class 2 severe obesity due to excess calories with serious comorbidity and body mass index (BMI) of 35.0 to 35.9 in adult (H)   Continue with dietary and lifestyle modifications to support weight loss.        BMI  Estimated body mass index is 36.16 kg/m  as calculated from the following:    Height as of this encounter: 1.58 m (5' 2.2\").    Weight as of this encounter: 90.3 kg (199 lb).       Counseling  Appropriate preventive services were addressed with this patient via screening, questionnaire, or discussion as appropriate for fall prevention, nutrition, physical activity, Tobacco-use cessation, social engagement, weight loss and cognition.  Checklist reviewing preventive services available has been given to the patient.  Reviewed patient's diet, addressing concerns and/or questions.   She is at risk for lack of exercise and has been provided with information to increase physical activity for the benefit of her well-being.     Return in about 1 year (around 5/21/2026) for Preventative Visit + Med Check.      The longitudinal plan of care for the diagnosis(es)/condition(s) as documented were addressed during this visit. Due to the added complexity in care, I will continue to support Leticia in the subsequent management and with ongoing continuity of care.      Subjective   Leticia is a 54 year old, presenting for the following:  Physical and Recheck Medication        5/21/2025     1:07 PM "   Additional Questions   [facility]   Accompanied by Self          HPI    Discuss Symbicort -- was given for illness. Wondering if needs to continue.  Off and on - Cough - productive - clear since finishing the inhaler.    Was given doxycycline, had acute onset of vomiting with medication.   Started on Symbicort on 5/6/25.    Was then changed to Levaquin.    Intermittent cough since October 2024.    Grandson is 14 months, he is sick all the time from .      Chronic bilateral knee pain, history of steroid injections and fluid removal.   Both knees feel tight.    Denies other joint pain.      Noted improvement in night sweats and has been sleeping better since starting Estradiol 0.5 mg daily.      Hyperlipidemia Follow-Up  Recent Labs   Lab Test 04/22/24  0934 01/18/23  0748   CHOL 210* 228*   HDL 89 80   * 137*   TRIG 51 56     Are you regularly taking any medication or supplement to lower your cholesterol?   Yes- Atorvastatin  Are you having muscle aches or other side effects that you think could be caused by your cholesterol lowering medication?  Possibly -- x5 months - knee's stiff, pain -- fluid withdrawn twice and 1 cortisone inj in 01/2025    Hypothyroidism Follow-up  TSH   Date Value Ref Range Status   04/22/2024 3.52 0.30 - 4.20 uIU/mL Final   01/18/2023 1.44 0.30 - 4.20 uIU/mL Final   11/26/2021 1.19 0.40 - 4.00 mU/L Final   09/29/2020 2.40 0.40 - 4.00 mU/L Final   05/08/2020 2.48 0.40 - 4.00 mU/L Final   03/14/2019 1.87 0.40 - 4.00 mU/L Final   06/06/2018 1.82 0.40 - 4.00 mU/L Final   04/27/2017 3.20 0.40 - 4.00 mU/L Final     Since last visit, patient describes the following symptoms: Weight stable, no hair loss, no skin changes, no constipation, no loose stools    Advance Care Planning  Patient states has Health Care Directive and will send to [facility].        5/21/2025   General Health   How would you rate your overall physical health? Good   Feel stress (tense, anxious, or  unable to sleep) Only a little   (!) STRESS CONCERN      2025   Nutrition   Three or more servings of calcium each day? (!) I DON'T KNOW   Diet: Low fat/cholesterol   How many servings of fruit and vegetables per day? (!) 2-3   How many sweetened beverages each day? 0-1         2025   Exercise   Days per week of moderate/strenous exercise 1 day   Average minutes spent exercising at this level 10 min   (!) EXERCISE CONCERN      2025   Social Factors   Frequency of gathering with friends or relatives Once a week   Worry food won't last until get money to buy more No   Food not last or not have enough money for food? No   Do you have housing? (Housing is defined as stable permanent housing and does not include staying outside in a car, in a tent, in an abandoned building, in an overnight shelter, or couch-surfing.) Yes   Are you worried about losing your housing? No   Lack of transportation? No   Unable to get utilities (heat,electricity)? No         2025   Fall Risk   Fallen 2 or more times in the past year? No   Trouble with walking or balance? No          2025   Dental   Dentist two times every year? Yes         Today's PHQ-2 Score:       2025     1:46 PM   PHQ-2 ( 1999 Pf[employer])   Q1: Little interest or pleasure in doing things 0   Q2: Feeling down, depressed or hopeless 0   PHQ-2 Score 0         2025   Substance Use   Alcohol more than 3/day or more than 7/wk No   Do you use any other substances recreationally? No     Social History     Tobacco Use    Smoking status: Former     Current packs/day: 0.00     Average packs/day: 0.5 packs/day for 5.0 years (2.5 ttl pk-yrs)     Types: Cigarettes     Start date: 1985     Quit date: 1990     Years since quittin.4    Smokeless tobacco: Never    Tobacco comments:     QUIT    Vaping Use    Vaping status: Never Used   Substance Use Topics    Alcohol use: Yes     Comment: 2-3 times per week    Drug use: No         3/10/2025    LAST FHS-7 RESULTS   1st degree relative breast or ovarian cancer Yes   Any relative bilateral breast cancer No   Any male have breast cancer No   Any ONE woman have BOTH breast AND ovarian cancer No   Any woman with breast cancer before 50yrs No   2 or more relatives with breast AND/OR ovarian cancer No   2 or more relatives with breast AND/OR bowel cancer No     Mammogram Screening - Mammogram every 1-2 years updated in Health Maintenance based on mutual decision making        5/21/2025   STI Screening   New sexual partner(s) since last STI/HIV test? No     History of abnormal Pap smear: No - age 30- 64 PAP with HPV every 5 years recommended        Latest Ref Rng & Units 4/9/2024     3:07 PM 3/15/2019     2:23 PM 3/15/2019     2:17 PM   PAP / HPV   PAP  Negative for Intraepithelial Lesion or Malignancy (NILM)      PAP (Historical)   NIL     HPV 16 DNA Negative Negative   Negative    HPV 18 DNA Negative Negative   Negative    Other HR HPV Negative Negative   Negative      ASCVD Risk   The 10-year ASCVD risk score (Edgar CASTELLON, et al., 2019) is: 1.8%    Values used to calculate the score:      Age: 54 years      Sex: Female      Is Non- : No      Diabetic: No      Tobacco smoker: No      Systolic Blood Pressure: 136 mmHg      Is BP treated: Yes      HDL Cholesterol: 89 mg/dL      Total Cholesterol: 210 mg/dL    Reviewed and updated as needed this visit by Provider       Med Hx  Surg Hx             BP Readings from Last 3 Encounters:   05/21/25 136/88   05/06/25 (!) 140/80   12/23/24 135/89    Wt Readings from Last 3 Encounters:   05/21/25 90.3 kg (199 lb)   05/06/25 88.9 kg (195 lb 14.4 oz)   12/23/24 87.7 kg (193 lb 6.4 oz)                  Patient Active Problem List   Diagnosis    Iron deficiency anemia    Hypothyroidism    Premenstrual tension syndrome    Menorrhagia    Dysmenorrhea    CARDIOVASCULAR SCREENING; LDL GOAL LESS THAN 160    Pain in joint, ankle and foot     Hypothyroidism due to acquired atrophy of thyroid    Hyperlipidemia LDL goal <130    Family history of breast cancer in female - maternal half sister age 50    Recurrent cold sores    Lateral epicondylitis of both elbows    Obesity     Past Surgical History:   Procedure Laterality Date    GYN SURGERY  2012    ablation    HERNIA REPAIR  1975    HYSTERECTOMY SUPRACERVICAL  2012    partial    ZZC NONSPECIFIC PROCEDURE  1973    Right Inguinal Hernia Repair    ZZC NONSPECIFIC PROCEDURE  1995    D&C for Spontaneous        Social History     Tobacco Use    Smoking status: Former     Current packs/day: 0.00     Average packs/day: 0.5 packs/day for 5.0 years (2.5 ttl pk-yrs)     Types: Cigarettes     Start date: 1985     Quit date: 1990     Years since quittin.4    Smokeless tobacco: Never    Tobacco comments:     QUIT    Substance Use Topics    Alcohol use: Yes     Comment: 2-3 times per week     Family History   Problem Relation Age of Onset    Thyroid Disease Mother         Hypothyroid    Hypertension Mother     Hyperlipidemia Mother     Cerebrovascular Disease Mother 70        TIA - former smoker    Hypertension Father     Diabetes Father         Insulin Dependent    Cardiovascular Father         Triple by-pass in [id number]    Coronary Artery Disease Father     Cancer Maternal Grandfather     Thyroid Disease Daughter         Dx at age 10    Thyroid Disease Son     Colon Cancer Maternal Half-Brother     Prostate Cancer Maternal Half-Brother     Breast Cancer Maternal Half-Sister 50    Colon Cancer No family hx of     Osteoporosis No family hx of          Current Outpatient Medications   Medication Sig Dispense Refill    acyclovir (ZOVIRAX) 200 MG capsule Take 1 capsule (200 mg) by mouth 5 times daily (May use as needed for cold sores) 25 capsule 1    atorvastatin (LIPITOR) 40 MG tablet Take 1 tablet (40 mg) by mouth daily. 90 tablet 3    budesonide-formoterol (SYMBICORT/BREYNA)  "80-4.5 MCG/ACT Inhaler Inhale 2 puffs into the lungs 2 times daily. 10.2 g 0    estradiol (ESTRACE) 0.5 MG tablet Take 1 tablet (0.5 mg) by mouth daily. 90 tablet 3    FLUoxetine (PROZAC) 40 MG capsule Take 1 capsule (40 mg) by mouth daily. 90 capsule 3    levothyroxine (SYNTHROID) 100 MCG tablet Take 1 tablet (100 mcg) by mouth daily. 90 tablet 3    losartan (COZAAR) 50 MG tablet Take 1.5 tablets (75 mg) by mouth daily. 135 tablet 3         Review of Systems  Constitutional, HEENT, cardiovascular, pulmonary, gi and gu systems are negative, except as otherwise noted.     Objective    Exam  /88 (BP Location: Left arm, Patient Position: Chair, Cuff Size: Adult Regular)   Pulse 94   Temp 97.3  F (36.3  C) (Tympanic)   Resp 13   Ht 1.58 m (5' 2.2\")   Wt 90.3 kg (199 lb)   LMP 09/13/2012 (Exact Date)   SpO2 100%   BMI 36.16 kg/m     Estimated body mass index is 36.16 kg/m  as calculated from the following:    Height as of this encounter: 1.58 m (5' 2.2\").    Weight as of this encounter: 90.3 kg (199 lb).    Physical Exam    GENERAL: alert and no distress  EYES: Eyes grossly normal to inspection  HENT: ear canals and TM's normal, nose and mouth without ulcers or lesions  NECK: no adenopathy, no asymmetry, masses, or scars  RESP: lungs clear to auscultation - no rales, rhonchi or wheezes  CV: regular rate and rhythm, normal S1 S2, no S3 or S4, no murmur, click or rub, no peripheral edema  ABDOMEN: soft, nontender, bowel sounds normal  MS: no gross musculoskeletal defects noted, no edema  SKIN: no suspicious lesions or rashes  NEURO: Normal strength and tone, mentation intact and speech normal  PSYCH: mentation appears normal, affect normal/bright        Signed Electronically by: Cait Roberts, APRN CNP    "

## 2025-05-21 NOTE — PATIENT INSTRUCTIONS
Patient Education   Preventive Care Advice   This is general advice given by our system to help you stay healthy. However, your care team may have specific advice just for you. Please talk to your care team about your preventive care needs.  Nutrition  Eat 5 or more servings of fruits and vegetables each day.  Try wheat bread, brown rice and whole grain pasta (instead of white bread, rice, and pasta).  Get enough calcium and vitamin D. Check the label on foods and aim for 100% of the RDA (recommended daily allowance).  Lifestyle  Exercise at least 150 minutes each week  (30 minutes a day, 5 days a week).  Do muscle strengthening activities 2 days a week. These help control your weight and prevent disease.  No smoking.  Wear sunscreen to prevent skin cancer.  Have a dental exam and cleaning every 6 months.  Yearly exams  See your health care team every year to talk about:  Any changes in your health.  Any medicines your care team has prescribed.  Preventive care, family planning, and ways to prevent chronic diseases.  Shots (vaccines)   HPV shots (up to age 26), if you've never had them before.  Hepatitis B shots (up to age 59), if you've never had them before.  COVID-19 shot: Get this shot when it's due.  Flu shot: Get a flu shot every year.  Tetanus shot: Get a tetanus shot every 10 years.  Pneumococcal, hepatitis A, and RSV shots: Ask your care team if you need these based on your risk.  Shingles shot (for age 50 and up)  General health tests  Diabetes screening:  Starting at age 35, Get screened for diabetes at least every 3 years.  If you are younger than age 35, ask your care team if you should be screened for diabetes.  Cholesterol test: At age 39, start having a cholesterol test every 5 years, or more often if advised.  Bone density scan (DEXA): At age 50, ask your care team if you should have this scan for osteoporosis (brittle bones).  Hepatitis C: Get tested at least once in your life.  STIs (sexually  transmitted infections)  Before age 24: Ask your care team if you should be screened for STIs.  After age 24: Get screened for STIs if you're at risk. You are at risk for STIs (including HIV) if:  You are sexually active with more than one person.  You don't use condoms every time.  You or a partner was diagnosed with a sexually transmitted infection.  If you are at risk for HIV, ask about PrEP medicine to prevent HIV.  Get tested for HIV at least once in your life, whether you are at risk for HIV or not.  Cancer screening tests  Cervical cancer screening: If you have a cervix, begin getting regular cervical cancer screening tests starting at age 21.  Breast cancer scan (mammogram): If you've ever had breasts, begin having regular mammograms starting at age 40. This is a scan to check for breast cancer.  Colon cancer screening: It is important to start screening for colon cancer at age 45.  Have a colonoscopy test every 10 years (or more often if you're at risk) Or, ask your provider about stool tests like a FIT test every year or Cologuard test every 3 years.  To learn more about your testing options, visit:   .  For help making a decision, visit:   https://bit.ly/nt81110.  Prostate cancer screening test: If you have a prostate, ask your care team if a prostate cancer screening test (PSA) at age 55 is right for you.  Lung cancer screening: If you are a current or former smoker ages 50 to 80, ask your care team if ongoing lung cancer screenings are right for you.  For informational purposes only. Not to replace the advice of your health care provider. Copyright   2023 [employer]. All rights reserved. Clinically reviewed by the [facility] Transitions Program. [employer] [id number] - REV 01/24.

## 2025-06-05 ENCOUNTER — LAB (OUTPATIENT)
Dept: LAB | Facility: CLINIC | Age: 55
End: 2025-06-05
Payer: COMMERCIAL

## 2025-06-05 DIAGNOSIS — I10 BENIGN ESSENTIAL HYPERTENSION: ICD-10-CM

## 2025-06-05 DIAGNOSIS — E78.5 HYPERLIPIDEMIA LDL GOAL <130: ICD-10-CM

## 2025-06-05 DIAGNOSIS — E03.4 HYPOTHYROIDISM DUE TO ACQUIRED ATROPHY OF THYROID: ICD-10-CM

## 2025-06-05 DIAGNOSIS — M25.50 MULTIPLE JOINT PAIN: ICD-10-CM

## 2025-06-05 LAB
ALBUMIN SERPL BCG-MCNC: 4.3 G/DL (ref 3.5–5.2)
ALP SERPL-CCNC: 107 U/L (ref 40–150)
ALT SERPL W P-5'-P-CCNC: 20 U/L (ref 0–50)
ANION GAP SERPL CALCULATED.3IONS-SCNC: 12 MMOL/L (ref 7–15)
AST SERPL W P-5'-P-CCNC: 27 U/L (ref 0–45)
BILIRUB SERPL-MCNC: 0.3 MG/DL
BUN SERPL-MCNC: 14.4 MG/DL (ref 6–20)
CALCIUM SERPL-MCNC: 9.2 MG/DL (ref 8.8–10.4)
CHLORIDE SERPL-SCNC: 104 MMOL/L (ref 98–107)
CHOLEST SERPL-MCNC: 164 MG/DL
CREAT SERPL-MCNC: 0.82 MG/DL (ref 0.51–0.95)
CRP SERPL-MCNC: 7.03 MG/L
EGFRCR SERPLBLD CKD-EPI 2021: 85 ML/MIN/1.73M2
ERYTHROCYTE [DISTWIDTH] IN BLOOD BY AUTOMATED COUNT: 15.3 % (ref 10–15)
ERYTHROCYTE [SEDIMENTATION RATE] IN BLOOD BY WESTERGREN METHOD: 16 MM/HR (ref 0–30)
FASTING STATUS PATIENT QL REPORTED: YES
FASTING STATUS PATIENT QL REPORTED: YES
GLUCOSE SERPL-MCNC: 87 MG/DL (ref 70–99)
HCO3 SERPL-SCNC: 24 MMOL/L (ref 22–29)
HCT VFR BLD AUTO: 38.5 % (ref 35–47)
HDLC SERPL-MCNC: 54 MG/DL
HGB BLD-MCNC: 13.4 G/DL (ref 11.7–15.7)
LDLC SERPL CALC-MCNC: 63 MG/DL
MCH RBC QN AUTO: 35.4 PG (ref 26.5–33)
MCHC RBC AUTO-ENTMCNC: 34.8 G/DL (ref 31.5–36.5)
MCV RBC AUTO: 102 FL (ref 78–100)
NONHDLC SERPL-MCNC: 110 MG/DL
PLATELET # BLD AUTO: 233 10E3/UL (ref 150–450)
POTASSIUM SERPL-SCNC: 4.3 MMOL/L (ref 3.4–5.3)
PROT SERPL-MCNC: 7.3 G/DL (ref 6.4–8.3)
RBC # BLD AUTO: 3.78 10E6/UL (ref 3.8–5.2)
RHEUMATOID FACT SERPL-ACNC: <10 IU/ML
SODIUM SERPL-SCNC: 140 MMOL/L (ref 135–145)
TRIGL SERPL-MCNC: 237 MG/DL
TSH SERPL DL<=0.005 MIU/L-ACNC: 3.53 UIU/ML (ref 0.3–4.2)
WBC # BLD AUTO: 4.8 10E3/UL (ref 4–11)

## 2025-06-06 ENCOUNTER — RESULTS FOLLOW-UP (OUTPATIENT)
Dept: FAMILY MEDICINE | Facility: CLINIC | Age: 55
End: 2025-06-06

## (undated) DEVICE — KIT ENDO TURNOVER/PROCEDURE W/CLEAN A SCOPE LINERS 103888

## (undated) DEVICE — FCP BIOPSY RADIAL JAW 4 JUMBO 3.2MM CHANNEL M00513370

## (undated) RX ORDER — FENTANYL CITRATE 50 UG/ML
INJECTION, SOLUTION INTRAMUSCULAR; INTRAVENOUS
Status: DISPENSED
Start: 2020-11-19